# Patient Record
Sex: FEMALE | Race: WHITE | NOT HISPANIC OR LATINO | Employment: OTHER | URBAN - METROPOLITAN AREA
[De-identification: names, ages, dates, MRNs, and addresses within clinical notes are randomized per-mention and may not be internally consistent; named-entity substitution may affect disease eponyms.]

---

## 2023-02-19 PROBLEM — I25.10 CAD (CORONARY ARTERY DISEASE): Status: ACTIVE | Noted: 2023-02-19

## 2023-02-19 PROBLEM — S91.109A OPEN WOUND OF TOE: Status: ACTIVE | Noted: 2023-02-19

## 2023-02-19 PROBLEM — E11.9 DIABETES MELLITUS (MULTI): Status: ACTIVE | Noted: 2023-02-19

## 2023-02-19 PROBLEM — I25.5 ISCHEMIC CARDIOMYOPATHY: Status: ACTIVE | Noted: 2023-02-19

## 2023-02-19 PROBLEM — I50.42 CHRONIC COMBINED SYSTOLIC AND DIASTOLIC HEART FAILURE (MULTI): Status: ACTIVE | Noted: 2023-02-19

## 2023-02-19 PROBLEM — E78.5 DYSLIPIDEMIA: Status: ACTIVE | Noted: 2023-02-19

## 2023-02-19 PROBLEM — I25.82 CHRONIC TOTAL OCCLUSION OF CORONARY ARTERY: Status: ACTIVE | Noted: 2023-02-19

## 2023-02-19 PROBLEM — I10 BENIGN ESSENTIAL HYPERTENSION: Status: ACTIVE | Noted: 2023-02-19

## 2023-02-19 PROBLEM — Z95.810 BIVENTRICULAR IMPLANTABLE CARDIOVERTER-DEFIBRILLATOR (ICD) IN SITU: Status: ACTIVE | Noted: 2023-02-19

## 2023-02-19 PROBLEM — E66.3 OVERWEIGHT WITH BODY MASS INDEX (BMI) OF 26 TO 26.9 IN ADULT: Status: ACTIVE | Noted: 2023-02-19

## 2023-02-19 PROBLEM — I20.0 UNSTABLE ANGINA (MULTI): Status: ACTIVE | Noted: 2023-02-19

## 2023-02-19 PROBLEM — L03.211 CELLULITIS OF FACE: Status: ACTIVE | Noted: 2023-02-19

## 2023-02-19 PROBLEM — M25.512 LEFT SHOULDER PAIN: Status: ACTIVE | Noted: 2023-02-19

## 2023-02-19 PROBLEM — M70.21 OLECRANON BURSITIS OF RIGHT ELBOW: Status: ACTIVE | Noted: 2023-02-19

## 2023-02-19 PROBLEM — I44.7 LBBB (LEFT BUNDLE BRANCH BLOCK): Status: ACTIVE | Noted: 2023-02-19

## 2023-02-19 PROBLEM — E66.3 OVERWEIGHT WITH BODY MASS INDEX (BMI) OF 27 TO 27.9 IN ADULT: Status: ACTIVE | Noted: 2023-02-19

## 2023-02-19 PROBLEM — R39.9 UTI SYMPTOMS: Status: ACTIVE | Noted: 2023-02-19

## 2023-02-19 PROBLEM — L30.9 DERMATITIS: Status: ACTIVE | Noted: 2023-02-19

## 2023-02-19 PROBLEM — M25.421 SWELLING OF RIGHT ELBOW: Status: ACTIVE | Noted: 2023-02-19

## 2023-02-19 PROBLEM — J30.2 SEASONAL ALLERGIC RHINITIS: Status: ACTIVE | Noted: 2023-02-19

## 2023-02-19 PROBLEM — I20.89 CHRONIC STABLE ANGINA (CMS-HCC): Status: ACTIVE | Noted: 2023-02-19

## 2023-02-19 PROBLEM — I47.10 PAROXYSMAL SVT (SUPRAVENTRICULAR TACHYCARDIA) (CMS-HCC): Status: ACTIVE | Noted: 2023-02-19

## 2023-02-19 RX ORDER — ASPIRIN 81 MG/1
1 TABLET ORAL DAILY
COMMUNITY
End: 2023-10-24

## 2023-02-19 RX ORDER — DICLOFENAC SODIUM 10 MG/G
GEL TOPICAL 4 TIMES DAILY
COMMUNITY
End: 2023-05-22

## 2023-02-19 RX ORDER — DAPAGLIFLOZIN 10 MG/1
1 TABLET, FILM COATED ORAL DAILY
COMMUNITY
End: 2023-05-31 | Stop reason: SDUPTHER

## 2023-02-19 RX ORDER — METOPROLOL SUCCINATE 50 MG/1
1 TABLET, EXTENDED RELEASE ORAL DAILY
COMMUNITY
End: 2023-05-22

## 2023-02-19 RX ORDER — MUPIROCIN 20 MG/G
1 OINTMENT TOPICAL 3 TIMES DAILY
COMMUNITY
End: 2023-10-23

## 2023-02-19 RX ORDER — DULAGLUTIDE 1.5 MG/.5ML
0.5 INJECTION, SOLUTION SUBCUTANEOUS
COMMUNITY
End: 2023-07-13 | Stop reason: SDUPTHER

## 2023-02-19 RX ORDER — SPIRONOLACTONE 25 MG/1
1 TABLET ORAL DAILY
COMMUNITY
End: 2023-11-02

## 2023-02-19 RX ORDER — ISOSORBIDE MONONITRATE 60 MG/1
1 TABLET, EXTENDED RELEASE ORAL DAILY
COMMUNITY
End: 2024-04-19

## 2023-02-19 RX ORDER — PNV NO.95/FERROUS FUM/FOLIC AC 28MG-0.8MG
100 TABLET ORAL DAILY
COMMUNITY

## 2023-02-19 RX ORDER — NITROGLYCERIN 400 UG/1
1 SPRAY ORAL AS NEEDED
Status: ON HOLD | COMMUNITY
End: 2023-12-18 | Stop reason: ENTERED-IN-ERROR

## 2023-02-19 RX ORDER — ERGOCALCIFEROL 1.25 MG/1
CAPSULE ORAL
Status: ON HOLD | COMMUNITY
End: 2023-12-18 | Stop reason: ENTERED-IN-ERROR

## 2023-02-19 RX ORDER — SACUBITRIL AND VALSARTAN 24; 26 MG/1; MG/1
0.5 TABLET, FILM COATED ORAL 2 TIMES DAILY
COMMUNITY
End: 2024-05-08

## 2023-02-19 RX ORDER — ATORVASTATIN CALCIUM 80 MG/1
80 TABLET, FILM COATED ORAL NIGHTLY
COMMUNITY
End: 2024-01-18 | Stop reason: SDUPTHER

## 2023-02-19 RX ORDER — TRIAMCINOLONE ACETONIDE 1 MG/G
1 CREAM TOPICAL 2 TIMES DAILY
COMMUNITY
End: 2023-05-22

## 2023-02-19 RX ORDER — FLUTICASONE PROPIONATE 50 MCG
1 SPRAY, SUSPENSION (ML) NASAL DAILY PRN
COMMUNITY

## 2023-02-19 RX ORDER — MULTIVIT-MIN/IRON/FOLIC ACID/K 18-600-40
CAPSULE ORAL
Status: ON HOLD | COMMUNITY
End: 2023-12-18 | Stop reason: ENTERED-IN-ERROR

## 2023-03-14 ENCOUNTER — APPOINTMENT (OUTPATIENT)
Dept: PRIMARY CARE | Facility: CLINIC | Age: 68
End: 2023-03-14

## 2023-03-14 ENCOUNTER — PATIENT OUTREACH (OUTPATIENT)
Dept: CARE COORDINATION | Facility: CLINIC | Age: 68
End: 2023-03-14

## 2023-03-14 DIAGNOSIS — I51.9 HEART DISEASE, UNSPECIFIED: ICD-10-CM

## 2023-03-14 NOTE — PROGRESS NOTES
Discharge Facility: [facility]  Discharge Diagnosis:  Admission Reason: Chest discomfort, ventricular arrhythmias(1)   Final Discharge Diagnoses: Re-entry ventricular arrhythmia, chest pain     Admission Date:3.9.23  Discharge Date:3.13.23    PCP appt: 3.17.23    over course of hospitalization, Patient was seen and evaluated by Dr. Boyer.  Troponin x4 were within normal limits, overall low suspicion for coronary etiology.  Dr. Tarango is placed on consult due to frequent nonsustained VT runs.  Patient was started on sotalol and ICD settings were adjusted.  Echo was done, which showed improved EF. Patient developed hypotension on 3/11 overnight, medications were adjusted lowering the dose of Imdur and spironolactone. BP slightly improved, discussed with Dr. Boyer, baseline SBP is in the low 100s. Patient is cleared from cardiology and EP standpoint for dc and follow up as outpatient.     Engagement  Call Start Time: 1553 (3/14/2023  3:53 PM)    Medications  Medications reviewed with patient/caregiver?: Yes (3/14/2023  3:53 PM)  Is the patient having any side effects they believe may be caused by any medication additions or changes?: No (3/14/2023  3:53 PM)  Does the patient have all medications ordered at discharge?: Yes (3/14/2023  3:53 PM)  Care Management Interventions: No intervention needed (3/14/2023  3:53 PM)  Is the patient taking all medications as directed (includes completed medication regime)?: Yes (3/14/2023  3:53 PM)  Care Management Interventions: Nurse provided patient education (3/14/2023  3:53 PM)    Appointments  Does the patient have a primary care provider?: Yes (3/14/2023  3:53 PM)  Care Management Interventions: Verified appointment date/time/provider (3/14/2023  3:53 PM)  Has the patient kept scheduled appointments due by today?: Yes (3/14/2023  3:53 PM)  Care Management Interventions: Advised patient to keep appointment (3/14/2023  3:53 PM)    Self Management  What is the home health  agency?: n/a (3/14/2023  3:53 PM)    Patient Teaching  Does the patient have access to their discharge instructions?: Yes (3/14/2023  3:53 PM)  Care Management Interventions: Reviewed instructions with patient (3/14/2023  3:53 PM)  What is the patient's perception of their health status since discharge?: Improving (3/14/2023  3:53 PM)  Is the patient/caregiver able to teach back the hierarchy of who to call/visit for symptoms/problems? PCP, Specialist, Home Health nurse, Urgent Care, ED, 911: Yes (3/14/2023  3:53 PM)

## 2023-03-17 ENCOUNTER — OFFICE VISIT (OUTPATIENT)
Dept: PRIMARY CARE | Facility: CLINIC | Age: 68
End: 2023-03-17
Payer: COMMERCIAL

## 2023-03-17 VITALS
SYSTOLIC BLOOD PRESSURE: 100 MMHG | BODY MASS INDEX: 27.64 KG/M2 | TEMPERATURE: 97.1 F | RESPIRATION RATE: 16 BRPM | WEIGHT: 171.25 LBS | HEART RATE: 91 BPM | OXYGEN SATURATION: 97 % | DIASTOLIC BLOOD PRESSURE: 65 MMHG

## 2023-03-17 DIAGNOSIS — F41.9 ANXIETY: Primary | ICD-10-CM

## 2023-03-17 DIAGNOSIS — I47.10 PAROXYSMAL SVT (SUPRAVENTRICULAR TACHYCARDIA) (CMS-HCC): ICD-10-CM

## 2023-03-17 DIAGNOSIS — I50.42 CHRONIC COMBINED SYSTOLIC AND DIASTOLIC HEART FAILURE (MULTI): ICD-10-CM

## 2023-03-17 PROCEDURE — 3051F HG A1C>EQUAL 7.0%<8.0%: CPT | Performed by: STUDENT IN AN ORGANIZED HEALTH CARE EDUCATION/TRAINING PROGRAM

## 2023-03-17 PROCEDURE — 1036F TOBACCO NON-USER: CPT | Performed by: STUDENT IN AN ORGANIZED HEALTH CARE EDUCATION/TRAINING PROGRAM

## 2023-03-17 PROCEDURE — 99496 TRANSJ CARE MGMT HIGH F2F 7D: CPT | Performed by: STUDENT IN AN ORGANIZED HEALTH CARE EDUCATION/TRAINING PROGRAM

## 2023-03-17 PROCEDURE — 3074F SYST BP LT 130 MM HG: CPT | Performed by: STUDENT IN AN ORGANIZED HEALTH CARE EDUCATION/TRAINING PROGRAM

## 2023-03-17 PROCEDURE — 3078F DIAST BP <80 MM HG: CPT | Performed by: STUDENT IN AN ORGANIZED HEALTH CARE EDUCATION/TRAINING PROGRAM

## 2023-03-17 RX ORDER — BUSPIRONE HYDROCHLORIDE 5 MG/1
15 TABLET ORAL 2 TIMES DAILY PRN
Qty: 60 TABLET | Refills: 1 | Status: SHIPPED | OUTPATIENT
Start: 2023-03-17 | End: 2024-04-12 | Stop reason: WASHOUT

## 2023-03-17 RX ORDER — SOTALOL HYDROCHLORIDE 80 MG/1
80 TABLET ORAL EVERY 12 HOURS SCHEDULED
COMMUNITY
End: 2023-10-25 | Stop reason: SDUPTHER

## 2023-03-17 ASSESSMENT — ENCOUNTER SYMPTOMS
WHEEZING: 0
CONFUSION: 0
LIGHT-HEADEDNESS: 1
CHILLS: 0
COUGH: 0
DIFFICULTY URINATING: 0
PALPITATIONS: 0
LOSS OF SENSATION IN FEET: 0
SHORTNESS OF BREATH: 0
HEADACHES: 0
OCCASIONAL FEELINGS OF UNSTEADINESS: 0
SORE THROAT: 0
DEPRESSION: 0
NERVOUS/ANXIOUS: 1
FEVER: 0
ABDOMINAL PAIN: 0

## 2023-03-17 ASSESSMENT — PATIENT HEALTH QUESTIONNAIRE - PHQ9
1. LITTLE INTEREST OR PLEASURE IN DOING THINGS: NOT AT ALL
2. FEELING DOWN, DEPRESSED OR HOPELESS: NOT AT ALL
SUM OF ALL RESPONSES TO PHQ9 QUESTIONS 1 AND 2: 0

## 2023-03-17 NOTE — PROGRESS NOTES
Subjective   Patient ID: Antonia Rodriguez is a 68 y.o. female who presents for Hospital Follow-up.    HPI   Hospitalization 3/11/23-3/14/23; Summary taken from TCC Note  Patient was seen and evaluated by Dr. Boyer. Troponin x4 were within normal limits, overall low suspicion for coronary etiology. Dr. Tarango is placed on consult due to frequent nonsustained VT runs. Patient was started on sotalol and ICD settings were adjusted. Echo was done, which showed improved EF. Patient developed hypotension on 3/11 overnight, medications were adjusted lowering the dose of Imdur and spironolactone. BP slightly improved, discussed with Dr. Boyer, baseline SBP is in the low 100s. Patient is cleared from cardiology and EP standpoint for dc and follow up as outpatient.     Hospital follow up for chest pain.  ICD wasn't picking VT episodes and was not going off. Pacer was recalibrated while in the hospital. Now on sotolol.   Pain is improving and significantly improved from prior to hospitalization.     Is having intermittent lightheadedness - Bps have bene ranging 80s-120s/50-70s.  Per Patricia, they discussed changing medications while in the hospital, but ultimately did not make adjustments.     Merlin follow up appt 3/29  Emelina August 2023.    Thinks she may be having some anxiety. Was laying in bed last night, thinking about recent medical events. Also her sister in law is in the hospital. She has been having some disagreements with exhusband, which have all been causing increased stress.   - ICD has not gone off.    Review of Systems   Constitutional:  Negative for chills and fever.   HENT:  Negative for congestion and sore throat.    Eyes:  Negative for visual disturbance.   Respiratory:  Negative for cough, shortness of breath and wheezing.    Cardiovascular:  Negative for chest pain, palpitations and leg swelling.   Gastrointestinal:  Negative for abdominal pain.   Genitourinary:  Negative for difficulty urinating.    Neurological:  Positive for light-headedness. Negative for headaches.   Psychiatric/Behavioral:  Negative for confusion. The patient is nervous/anxious.        Objective   /65 (BP Location: Right arm, Patient Position: Sitting)   Pulse 91   Temp 36.2 °C (97.1 °F) (Temporal)   Resp 16   Wt 77.7 kg (171 lb 4 oz)   SpO2 97%   BMI 27.64 kg/m²     Physical Exam  Vitals and nursing note reviewed.   Constitutional:       General: She is not in acute distress.     Appearance: Normal appearance. She is not toxic-appearing.   HENT:      Head: Normocephalic and atraumatic.      Nose: Nose normal.      Mouth/Throat:      Mouth: Mucous membranes are moist.      Pharynx: Oropharynx is clear.   Eyes:      Extraocular Movements: Extraocular movements intact.      Conjunctiva/sclera: Conjunctivae normal.   Cardiovascular:      Rate and Rhythm: Normal rate and regular rhythm.      Heart sounds: Normal heart sounds.   Pulmonary:      Effort: Pulmonary effort is normal.      Breath sounds: Normal breath sounds.   Abdominal:      General: Abdomen is flat. Bowel sounds are normal.      Palpations: Abdomen is soft.   Musculoskeletal:         General: Normal range of motion.      Cervical back: Normal range of motion.   Skin:     General: Skin is warm and dry.   Neurological:      General: No focal deficit present.      Mental Status: She is alert and oriented to person, place, and time.   Psychiatric:         Behavior: Behavior normal.         Thought Content: Thought content normal.         Assessment/Plan   Diagnoses and all orders for this visit:  Anxiety  -     busPIRone (Buspar) 5 mg tablet; Take 3 tablets (15 mg) by mouth 2 times a day as needed (As needed for anxiety).  Chronic combined systolic and diastolic heart failure (CMS/HCC)  Paroxysmal SVT (supraventricular tachycardia) (CMS/HCC)  - Pain improving. No interventions at this time. Discussed mild symptomatic hypotension, may benefit from medication adjustments.  She plans to bring up with Dr. Boyer, cardiology, at her follow up appt in 2 weeks.  - Discussed indications to present to the ER with patient, such as chest pain, SOB, persistent palpitations, her ICD going off, syncope, or any concerning symptom she may have.    Katherine Monae, DO

## 2023-04-18 ENCOUNTER — PATIENT OUTREACH (OUTPATIENT)
Dept: CARE COORDINATION | Facility: CLINIC | Age: 68
End: 2023-04-18
Payer: COMMERCIAL

## 2023-04-18 NOTE — PROGRESS NOTES
Call regarding apt with PCP on 4.18.23after hospitalization for .  At time of outreach call the patient feels as if their condition has improved since last visit.  Patient verbalizes understanding of new orders including labs, therapy, HHC, and DME.  Also following with cardiologist.  Patient verbalizes understanding of medications including changes made during PCP  Patient verbalizes understanding of referrals needed to specialist.  Patient verbalized understanding plan of care   Any further questions or concerns at this time for PCP? No  Does patient appear to have CCM needs and will need referral to nurse after call back? No

## 2023-05-15 ENCOUNTER — OFFICE VISIT (OUTPATIENT)
Dept: PRIMARY CARE | Facility: CLINIC | Age: 68
End: 2023-05-15
Payer: COMMERCIAL

## 2023-05-15 VITALS
WEIGHT: 171 LBS | OXYGEN SATURATION: 97 % | HEART RATE: 90 BPM | TEMPERATURE: 97.9 F | DIASTOLIC BLOOD PRESSURE: 72 MMHG | RESPIRATION RATE: 17 BRPM | BODY MASS INDEX: 27.6 KG/M2 | SYSTOLIC BLOOD PRESSURE: 108 MMHG

## 2023-05-15 DIAGNOSIS — S20.211A CONTUSION OF RIGHT CHEST WALL, INITIAL ENCOUNTER: Primary | ICD-10-CM

## 2023-05-15 PROCEDURE — 1036F TOBACCO NON-USER: CPT | Performed by: STUDENT IN AN ORGANIZED HEALTH CARE EDUCATION/TRAINING PROGRAM

## 2023-05-15 PROCEDURE — 3078F DIAST BP <80 MM HG: CPT | Performed by: STUDENT IN AN ORGANIZED HEALTH CARE EDUCATION/TRAINING PROGRAM

## 2023-05-15 PROCEDURE — 99213 OFFICE O/P EST LOW 20 MIN: CPT | Performed by: STUDENT IN AN ORGANIZED HEALTH CARE EDUCATION/TRAINING PROGRAM

## 2023-05-15 PROCEDURE — 3051F HG A1C>EQUAL 7.0%<8.0%: CPT | Performed by: STUDENT IN AN ORGANIZED HEALTH CARE EDUCATION/TRAINING PROGRAM

## 2023-05-15 PROCEDURE — 3074F SYST BP LT 130 MM HG: CPT | Performed by: STUDENT IN AN ORGANIZED HEALTH CARE EDUCATION/TRAINING PROGRAM

## 2023-05-15 PROCEDURE — 1159F MED LIST DOCD IN RCRD: CPT | Performed by: STUDENT IN AN ORGANIZED HEALTH CARE EDUCATION/TRAINING PROGRAM

## 2023-05-15 ASSESSMENT — ENCOUNTER SYMPTOMS
COUGH: 0
RHINORRHEA: 0
DIZZINESS: 0
HEADACHES: 0
FEVER: 0
CHILLS: 0
SHORTNESS OF BREATH: 0

## 2023-05-15 NOTE — PROGRESS NOTES
Subjective   Patient ID: Antonia Rodriguez is a 68 y.o. female who presents for Follow-up (Bruise on pt chest).    Bruise on her right chest near her axilla.  Started on Saturday 5/13. Started as a lump Saturday, then became a bruise on Sunday.  Rates pain 2/10, constant.  Has used iced on the spot.  Not taking any medication for pain.  Denies falls, trauma to site, recent strenuous activity or repetitive movements.     Denies chest pain, SOB, LUE paresthesia.         Review of Systems   Constitutional:  Negative for chills and fever.   HENT:  Negative for congestion and rhinorrhea.    Respiratory:  Negative for cough and shortness of breath.    Cardiovascular:  Negative for chest pain.   Skin:         Bruising right chest   Neurological:  Negative for dizziness and headaches.       Objective   /72 (BP Location: Left arm, Patient Position: Sitting, BP Cuff Size: Adult)   Pulse 90   Temp 36.6 °C (97.9 °F)   Resp 17   Wt 77.6 kg (171 lb)   SpO2 97%   BMI 27.60 kg/m²     Physical Exam  Vitals and nursing note reviewed.   Constitutional:       General: She is not in acute distress.     Appearance: Normal appearance. She is not toxic-appearing.   HENT:      Head: Normocephalic and atraumatic.      Nose: Nose normal.      Mouth/Throat:      Mouth: Mucous membranes are moist.      Pharynx: Oropharynx is clear.   Eyes:      Extraocular Movements: Extraocular movements intact.      Conjunctiva/sclera: Conjunctivae normal.   Cardiovascular:      Rate and Rhythm: Normal rate and regular rhythm.      Heart sounds: Normal heart sounds.   Pulmonary:      Effort: Pulmonary effort is normal.      Breath sounds: Normal breath sounds.   Abdominal:      General: Abdomen is flat. Bowel sounds are normal.      Palpations: Abdomen is soft.   Musculoskeletal:         General: Normal range of motion.      Cervical back: Normal range of motion.   Skin:     General: Skin is warm and dry.      Findings: Bruising present.       Comments: 2cm in diameter bruise at right chest medial to axilla. Small palpable nodular bump on palpation below bruise. Mildly tender to palpation.   Neurological:      General: No focal deficit present.      Mental Status: She is alert and oriented to person, place, and time.   Psychiatric:         Behavior: Behavior normal.         Thought Content: Thought content normal.       68 year old female presenting for bruising on right right chest medial to her axilla. On palpation,, site is mildly tender with a small nodular feel below bruise. Mammogram 09/2022 normal. Will obtain US of chest to evaluate bruise. Suspect it is 2/2 to small trauma and she is more sensitive to bruising due to blood thinners. Discussed indications to follow up in office - worsening pain, swelling, paresthesia, increasing in size of bruise, or any concern she has.     Assessment/Plan   Diagnoses and all orders for this visit:  Contusion of right chest wall, initial encounter  -     US chest; Future
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
## 2023-05-19 DIAGNOSIS — R22.2 CHEST WALL MASS: Primary | ICD-10-CM

## 2023-05-19 NOTE — RESULT ENCOUNTER NOTE
Can we let the patient know the ultrasound showed nonspecific findings.  I believe it may be a complex hematoma however we need to sure it resolves.  I wrote a general surgery referral, she is to schedule if it is still present in 1 to 2 weeks.  Also please follow-up with us in 1 week

## 2023-05-22 ENCOUNTER — OFFICE VISIT (OUTPATIENT)
Dept: PRIMARY CARE | Facility: CLINIC | Age: 68
End: 2023-05-22
Payer: COMMERCIAL

## 2023-05-22 VITALS
RESPIRATION RATE: 18 BRPM | WEIGHT: 170 LBS | SYSTOLIC BLOOD PRESSURE: 106 MMHG | HEART RATE: 82 BPM | BODY MASS INDEX: 27.32 KG/M2 | DIASTOLIC BLOOD PRESSURE: 67 MMHG | HEIGHT: 66 IN | TEMPERATURE: 98.3 F | OXYGEN SATURATION: 97 %

## 2023-05-22 DIAGNOSIS — S20.211S CONTUSION OF RIGHT CHEST WALL, SEQUELA: Primary | ICD-10-CM

## 2023-05-22 PROCEDURE — 3074F SYST BP LT 130 MM HG: CPT | Performed by: STUDENT IN AN ORGANIZED HEALTH CARE EDUCATION/TRAINING PROGRAM

## 2023-05-22 PROCEDURE — 3078F DIAST BP <80 MM HG: CPT | Performed by: STUDENT IN AN ORGANIZED HEALTH CARE EDUCATION/TRAINING PROGRAM

## 2023-05-22 PROCEDURE — 1159F MED LIST DOCD IN RCRD: CPT | Performed by: STUDENT IN AN ORGANIZED HEALTH CARE EDUCATION/TRAINING PROGRAM

## 2023-05-22 PROCEDURE — 1036F TOBACCO NON-USER: CPT | Performed by: STUDENT IN AN ORGANIZED HEALTH CARE EDUCATION/TRAINING PROGRAM

## 2023-05-22 PROCEDURE — 1160F RVW MEDS BY RX/DR IN RCRD: CPT | Performed by: STUDENT IN AN ORGANIZED HEALTH CARE EDUCATION/TRAINING PROGRAM

## 2023-05-22 PROCEDURE — 99213 OFFICE O/P EST LOW 20 MIN: CPT | Performed by: STUDENT IN AN ORGANIZED HEALTH CARE EDUCATION/TRAINING PROGRAM

## 2023-05-22 PROCEDURE — 3051F HG A1C>EQUAL 7.0%<8.0%: CPT | Performed by: STUDENT IN AN ORGANIZED HEALTH CARE EDUCATION/TRAINING PROGRAM

## 2023-05-22 ASSESSMENT — ENCOUNTER SYMPTOMS
FEVER: 0
SHORTNESS OF BREATH: 0
CHILLS: 0
RHINORRHEA: 0
HEADACHES: 0
COUGH: 0
DIZZINESS: 0

## 2023-05-22 NOTE — PROGRESS NOTES
"Subjective   Patient ID: Antonia Rodriguez is a 68 y.o. female who presents for Follow-up (Cyst on chest).    HPI   Pt here for follow up on swelling and bruise at right chest. Bruising is improving. Pt had outlined bruise, feels that in increased in size and then began to decrease and heal. She can still feel a nodule at the superior aspect of the bruise on her right chest.     Denies chest pain, SOB, paresthesias.     US of bruise and nodule obtained - indeterminate lesion 1.2cm may represent complex hematoma. Follow up recommended.     Review of Systems   Constitutional:  Negative for chills and fever.   HENT:  Negative for congestion and rhinorrhea.    Respiratory:  Negative for cough and shortness of breath.    Cardiovascular:  Negative for chest pain.   Neurological:  Negative for dizziness and headaches.       Objective   /67 (BP Location: Right arm, Patient Position: Sitting)   Pulse 82   Temp 36.8 °C (98.3 °F)   Resp 18   Ht 1.676 m (5' 6\")   Wt 77.1 kg (170 lb)   SpO2 97%   BMI 27.44 kg/m²     Physical Exam  Vitals and nursing note reviewed.   Constitutional:       General: She is not in acute distress.     Appearance: Normal appearance. She is not toxic-appearing.   HENT:      Head: Normocephalic and atraumatic.      Nose: Nose normal.      Mouth/Throat:      Mouth: Mucous membranes are moist.      Pharynx: Oropharynx is clear.   Eyes:      Extraocular Movements: Extraocular movements intact.      Conjunctiva/sclera: Conjunctivae normal.   Cardiovascular:      Rate and Rhythm: Normal rate and regular rhythm.      Heart sounds: Normal heart sounds.   Pulmonary:      Effort: Pulmonary effort is normal.      Breath sounds: Normal breath sounds.   Abdominal:      General: Abdomen is flat. Bowel sounds are normal.      Palpations: Abdomen is soft.   Musculoskeletal:         General: Normal range of motion.      Cervical back: Normal range of motion.   Skin:     General: Skin is warm and dry.      " Comments: Bruise at right chest superior to axilla improving - yellow in color. 1cm nodule palpable, soft, mildly tender, movable.    Neurological:      General: No focal deficit present.      Mental Status: She is alert and oriented to person, place, and time.   Psychiatric:         Behavior: Behavior normal.         Thought Content: Thought content normal.       68 year old female with pmh CHF s/p pacemaker presenting for follow up of right chest contusion. Bruising is improved from initial visit 1 week ago. US reviewed - indeterminate 1.2cm lesion. Suspect this may be hematoma healing, but is delayed due to anticoagulation. Will follow up in 3 weeks, if nodule still present and palpable, will obtain CT. Discussed to avoid pressing on lesion and to use heat pack to help with healing process. To follow up in 3 weeks, sooner if lesion is changing or patient has concerns.     Assessment/Plan   Diagnoses and all orders for this visit:  Contusion of right chest wall, sequela

## 2023-05-22 NOTE — PROGRESS NOTES
I reviewed with the resident the medical history and the resident’s findings on physical examination.  I discussed with the resident the patient’s diagnosis and concur with the treatment plan as documented in the resident note.   The bruising has improved.  Still has a nodule.  Patient to try warm compress.  We will give it 2 more weeks.  If its still present she will have to see general surgery to have this removed.  Patient aware if any swelling any chest pain shortness of breath any nausea vomit diarrhea fever headache any concerning symptoms to go to the ER  Follow-up in 2 weeks.  May need a CT scan also  Emil Guthrie, DO

## 2023-05-31 DIAGNOSIS — E11.9 TYPE 2 DIABETES MELLITUS WITHOUT COMPLICATION, WITHOUT LONG-TERM CURRENT USE OF INSULIN (MULTI): Primary | ICD-10-CM

## 2023-06-01 RX ORDER — DAPAGLIFLOZIN 10 MG/1
10 TABLET, FILM COATED ORAL DAILY
Qty: 90 TABLET | Refills: 2 | Status: SHIPPED | OUTPATIENT
Start: 2023-06-01 | End: 2024-05-08 | Stop reason: SDUPTHER

## 2023-06-05 ENCOUNTER — TELEPHONE (OUTPATIENT)
Dept: PRIMARY CARE | Facility: CLINIC | Age: 68
End: 2023-06-05
Payer: COMMERCIAL

## 2023-06-06 ENCOUNTER — DOCUMENTATION (OUTPATIENT)
Dept: PRIMARY CARE | Facility: CLINIC | Age: 68
End: 2023-06-06
Payer: COMMERCIAL

## 2023-06-06 NOTE — PROGRESS NOTES
Patient called on 6/5/23 due to a wound on her lower extremity that has not stopped bleeding. On talking to her on 6/6/23, the wound has stopped bleeding. She denies chest pain, SOB, leg pain, dizziness, light headedness, palpitations.   Discussed that if she is experiencing symptoms, to call the office and come in to be seen. If she does experience symptoms, we will get blood wound to check her blood counts and INR.

## 2023-06-08 ENCOUNTER — OFFICE VISIT (OUTPATIENT)
Dept: PRIMARY CARE | Facility: CLINIC | Age: 68
End: 2023-06-08
Payer: COMMERCIAL

## 2023-06-08 VITALS
TEMPERATURE: 98.4 F | HEIGHT: 66 IN | RESPIRATION RATE: 16 BRPM | WEIGHT: 171.38 LBS | OXYGEN SATURATION: 96 % | DIASTOLIC BLOOD PRESSURE: 70 MMHG | BODY MASS INDEX: 27.54 KG/M2 | HEART RATE: 87 BPM | SYSTOLIC BLOOD PRESSURE: 104 MMHG

## 2023-06-08 DIAGNOSIS — L97.522 ULCER OF TOE OF LEFT FOOT, WITH FAT LAYER EXPOSED (MULTI): ICD-10-CM

## 2023-06-08 DIAGNOSIS — I50.42 CHRONIC COMBINED SYSTOLIC AND DIASTOLIC HEART FAILURE (MULTI): ICD-10-CM

## 2023-06-08 DIAGNOSIS — R05.1 ACUTE COUGH: Primary | ICD-10-CM

## 2023-06-08 DIAGNOSIS — E11.42 DIABETIC POLYNEUROPATHY ASSOCIATED WITH TYPE 2 DIABETES MELLITUS (MULTI): ICD-10-CM

## 2023-06-08 DIAGNOSIS — I20.89 CHRONIC STABLE ANGINA (CMS-HCC): ICD-10-CM

## 2023-06-08 PROCEDURE — 3078F DIAST BP <80 MM HG: CPT | Performed by: STUDENT IN AN ORGANIZED HEALTH CARE EDUCATION/TRAINING PROGRAM

## 2023-06-08 PROCEDURE — 1159F MED LIST DOCD IN RCRD: CPT | Performed by: STUDENT IN AN ORGANIZED HEALTH CARE EDUCATION/TRAINING PROGRAM

## 2023-06-08 PROCEDURE — 99213 OFFICE O/P EST LOW 20 MIN: CPT | Performed by: STUDENT IN AN ORGANIZED HEALTH CARE EDUCATION/TRAINING PROGRAM

## 2023-06-08 PROCEDURE — 3074F SYST BP LT 130 MM HG: CPT | Performed by: STUDENT IN AN ORGANIZED HEALTH CARE EDUCATION/TRAINING PROGRAM

## 2023-06-08 PROCEDURE — 1160F RVW MEDS BY RX/DR IN RCRD: CPT | Performed by: STUDENT IN AN ORGANIZED HEALTH CARE EDUCATION/TRAINING PROGRAM

## 2023-06-08 PROCEDURE — 1036F TOBACCO NON-USER: CPT | Performed by: STUDENT IN AN ORGANIZED HEALTH CARE EDUCATION/TRAINING PROGRAM

## 2023-06-08 PROCEDURE — 3051F HG A1C>EQUAL 7.0%<8.0%: CPT | Performed by: STUDENT IN AN ORGANIZED HEALTH CARE EDUCATION/TRAINING PROGRAM

## 2023-06-08 RX ORDER — BENZONATATE 100 MG/1
100 CAPSULE ORAL 3 TIMES DAILY PRN
Qty: 42 CAPSULE | Refills: 0 | Status: SHIPPED | OUTPATIENT
Start: 2023-06-08 | End: 2023-07-08

## 2023-06-08 RX ORDER — GUAIFENESIN 600 MG/1
1200 TABLET, EXTENDED RELEASE ORAL 2 TIMES DAILY
Qty: 30 TABLET | Refills: 0 | Status: SHIPPED | OUTPATIENT
Start: 2023-06-08 | End: 2023-10-23

## 2023-06-08 ASSESSMENT — ENCOUNTER SYMPTOMS
COUGH: 1
SHORTNESS OF BREATH: 1
FEVER: 0
CHILLS: 1
CHEST TIGHTNESS: 1
HEADACHES: 0
UNEXPECTED WEIGHT CHANGE: 0
DIZZINESS: 0
RHINORRHEA: 0
SINUS PRESSURE: 0

## 2023-06-08 NOTE — PROGRESS NOTES
"Subjective   Patient ID: Antonia Rodriguez is a 68 y.o. female who presents for Cough (Pt here today for persistent cough).    HPI   Cough started Tuesday, 6/6, as well as chest pressure.   Took nitroglycerin at 1030 this morning. Does feel improvement after the medication.  Does get chills, intermittently.   Breathing is mixed. Yesterday \"felt like she was drowning\" and then coughed up some phlegm and felt improved. But then will also have the intermittent chest pressure.     Review of Systems   Constitutional:  Positive for chills. Negative for fever and unexpected weight change.   HENT:  Negative for congestion, rhinorrhea and sinus pressure.    Respiratory:  Positive for cough, chest tightness and shortness of breath.    Cardiovascular:  Negative for leg swelling.   Neurological:  Negative for dizziness and headaches.       Objective   /70 (BP Location: Right arm, Patient Position: Sitting)   Pulse 87   Temp 36.9 °C (98.4 °F) (Temporal)   Resp 16   Ht 1.676 m (5' 6\")   Wt 77.7 kg (171 lb 6 oz)   SpO2 96%   BMI 27.66 kg/m²     Physical Exam  Vitals and nursing note reviewed.   Constitutional:       General: She is not in acute distress.     Appearance: Normal appearance. She is not toxic-appearing.   HENT:      Head: Normocephalic and atraumatic.      Right Ear: Tympanic membrane, ear canal and external ear normal.      Left Ear: Tympanic membrane, ear canal and external ear normal.      Nose: Nose normal. No congestion or rhinorrhea.      Mouth/Throat:      Mouth: Mucous membranes are moist.      Pharynx: Oropharynx is clear. No oropharyngeal exudate or posterior oropharyngeal erythema.   Eyes:      Extraocular Movements: Extraocular movements intact.      Conjunctiva/sclera: Conjunctivae normal.      Pupils: Pupils are equal, round, and reactive to light.   Cardiovascular:      Rate and Rhythm: Normal rate and regular rhythm.      Heart sounds: Normal heart sounds.   Pulmonary:      Effort: " Pulmonary effort is normal. No respiratory distress.      Breath sounds: Normal breath sounds. No wheezing, rhonchi or rales.   Abdominal:      General: Abdomen is flat. Bowel sounds are normal.      Palpations: Abdomen is soft.   Musculoskeletal:         General: Normal range of motion.      Cervical back: Normal range of motion and neck supple.      Right lower leg: No edema.      Left lower leg: No edema.   Skin:     General: Skin is warm and dry.   Neurological:      General: No focal deficit present.      Mental Status: She is alert and oriented to person, place, and time.   Psychiatric:         Mood and Affect: Mood normal.         Behavior: Behavior normal.         Thought Content: Thought content normal.       68 year old female presenting for acute cough with chest tightness. Physical exam unremarkable. However, given her significant cardiac history, CXR obtained and review in office. No signs of CHF exacerbation on imaging or on physical. Suspect viral URI. Will start mucinex and tessalon perls at this time with low threshold to cover for bacterial infection if no improvement. She is to call office on Monday, 6/12, if no improvement.    Assessment/Plan   Diagnoses and all orders for this visit:  Acute cough  -     XR chest 2 views; Future  -     benzonatate (Tessalon) 100 mg capsule; Take 1 capsule (100 mg) by mouth 3 times a day as needed for cough. Do not crush or chew.  -     guaiFENesin (Mucinex) 600 mg 12 hr tablet; Take 2 tablets (1,200 mg) by mouth 2 times a day. Do not crush, chew, or split.

## 2023-06-09 PROBLEM — E11.42 DIABETIC POLYNEUROPATHY ASSOCIATED WITH TYPE 2 DIABETES MELLITUS (MULTI): Status: ACTIVE | Noted: 2023-06-09

## 2023-06-09 PROBLEM — L97.522 ULCER OF TOE OF LEFT FOOT, WITH FAT LAYER EXPOSED (MULTI): Status: ACTIVE | Noted: 2023-06-09

## 2023-06-12 ENCOUNTER — APPOINTMENT (OUTPATIENT)
Dept: PRIMARY CARE | Facility: CLINIC | Age: 68
End: 2023-06-12
Payer: COMMERCIAL

## 2023-07-12 LAB
BASOPHILS (10*3/UL) IN BLOOD BY AUTOMATED COUNT: 0.05 X10E9/L (ref 0–0.1)
BASOPHILS/100 LEUKOCYTES IN BLOOD BY AUTOMATED COUNT: 0.6 % (ref 0–2)
C REACTIVE PROTEIN (MG/L) IN SER/PLAS: <0.1 MG/DL
EOSINOPHILS (10*3/UL) IN BLOOD BY AUTOMATED COUNT: 0.18 X10E9/L (ref 0–0.7)
EOSINOPHILS/100 LEUKOCYTES IN BLOOD BY AUTOMATED COUNT: 2.2 % (ref 0–6)
ERYTHROCYTE DISTRIBUTION WIDTH (RATIO) BY AUTOMATED COUNT: 14.1 % (ref 11.5–14.5)
ERYTHROCYTE MEAN CORPUSCULAR HEMOGLOBIN CONCENTRATION (G/DL) BY AUTOMATED: 31.9 G/DL (ref 32–36)
ERYTHROCYTE MEAN CORPUSCULAR VOLUME (FL) BY AUTOMATED COUNT: 93 FL (ref 80–100)
ERYTHROCYTES (10*6/UL) IN BLOOD BY AUTOMATED COUNT: 4.9 X10E12/L (ref 4–5.2)
HEMATOCRIT (%) IN BLOOD BY AUTOMATED COUNT: 45.4 % (ref 36–46)
HEMOGLOBIN (G/DL) IN BLOOD: 14.5 G/DL (ref 12–16)
IMMATURE GRANULOCYTES/100 LEUKOCYTES IN BLOOD BY AUTOMATED COUNT: 0.4 % (ref 0–0.9)
LEUKOCYTES (10*3/UL) IN BLOOD BY AUTOMATED COUNT: 8.2 X10E9/L (ref 4.4–11.3)
LYMPHOCYTES (10*3/UL) IN BLOOD BY AUTOMATED COUNT: 2 X10E9/L (ref 1.2–4.8)
LYMPHOCYTES/100 LEUKOCYTES IN BLOOD BY AUTOMATED COUNT: 24.4 % (ref 13–44)
MONOCYTES (10*3/UL) IN BLOOD BY AUTOMATED COUNT: 0.63 X10E9/L (ref 0.1–1)
MONOCYTES/100 LEUKOCYTES IN BLOOD BY AUTOMATED COUNT: 7.7 % (ref 2–10)
NEUTROPHILS (10*3/UL) IN BLOOD BY AUTOMATED COUNT: 5.31 X10E9/L (ref 1.2–7.7)
NEUTROPHILS/100 LEUKOCYTES IN BLOOD BY AUTOMATED COUNT: 64.7 % (ref 40–80)
PLATELETS (10*3/UL) IN BLOOD AUTOMATED COUNT: 345 X10E9/L (ref 150–450)
SEDIMENTATION RATE, ERYTHROCYTE: 2 MM/H (ref 0–30)

## 2023-07-13 ENCOUNTER — TELEPHONE (OUTPATIENT)
Dept: PRIMARY CARE | Facility: CLINIC | Age: 68
End: 2023-07-13
Payer: COMMERCIAL

## 2023-07-13 DIAGNOSIS — E11.9 TYPE 2 DIABETES MELLITUS WITHOUT COMPLICATION, WITHOUT LONG-TERM CURRENT USE OF INSULIN (MULTI): Primary | ICD-10-CM

## 2023-07-13 RX ORDER — DULAGLUTIDE 1.5 MG/.5ML
1.5 INJECTION, SOLUTION SUBCUTANEOUS
Qty: 2 ML | Refills: 3 | Status: SHIPPED | OUTPATIENT
Start: 2023-07-13 | End: 2023-10-25 | Stop reason: SDUPTHER

## 2023-07-24 ENCOUNTER — DOCUMENTATION (OUTPATIENT)
Dept: CARE COORDINATION | Facility: CLINIC | Age: 68
End: 2023-07-24
Payer: COMMERCIAL

## 2023-10-02 ENCOUNTER — LAB (OUTPATIENT)
Dept: LAB | Facility: LAB | Age: 68
End: 2023-10-02
Payer: COMMERCIAL

## 2023-10-02 ENCOUNTER — OFFICE VISIT (OUTPATIENT)
Dept: PRIMARY CARE | Facility: CLINIC | Age: 68
End: 2023-10-02
Payer: COMMERCIAL

## 2023-10-02 ENCOUNTER — APPOINTMENT (OUTPATIENT)
Dept: SLEEP MEDICINE | Facility: CLINIC | Age: 68
End: 2023-10-02
Payer: COMMERCIAL

## 2023-10-02 VITALS
SYSTOLIC BLOOD PRESSURE: 86 MMHG | HEIGHT: 66 IN | RESPIRATION RATE: 16 BRPM | DIASTOLIC BLOOD PRESSURE: 55 MMHG | BODY MASS INDEX: 27.72 KG/M2 | WEIGHT: 172.5 LBS | HEART RATE: 88 BPM | TEMPERATURE: 97.6 F | OXYGEN SATURATION: 97 %

## 2023-10-02 DIAGNOSIS — E11.9 TYPE 2 DIABETES MELLITUS WITHOUT COMPLICATION, WITHOUT LONG-TERM CURRENT USE OF INSULIN (MULTI): ICD-10-CM

## 2023-10-02 DIAGNOSIS — E11.42 DIABETIC POLYNEUROPATHY ASSOCIATED WITH TYPE 2 DIABETES MELLITUS (MULTI): ICD-10-CM

## 2023-10-02 DIAGNOSIS — Z00.00 ROUTINE GENERAL MEDICAL EXAMINATION AT HEALTH CARE FACILITY: Primary | ICD-10-CM

## 2023-10-02 DIAGNOSIS — I50.42 CHRONIC COMBINED SYSTOLIC AND DIASTOLIC HEART FAILURE (MULTI): ICD-10-CM

## 2023-10-02 LAB
ALBUMIN SERPL BCP-MCNC: 4.2 G/DL (ref 3.4–5)
ALP SERPL-CCNC: 93 U/L (ref 33–136)
ALT SERPL W P-5'-P-CCNC: 20 U/L (ref 7–45)
ANION GAP SERPL CALC-SCNC: 11 MMOL/L (ref 10–20)
AST SERPL W P-5'-P-CCNC: 20 U/L (ref 9–39)
BILIRUB SERPL-MCNC: 0.6 MG/DL (ref 0–1.2)
BUN SERPL-MCNC: 16 MG/DL (ref 6–23)
CALCIUM SERPL-MCNC: 9.2 MG/DL (ref 8.6–10.3)
CHLORIDE SERPL-SCNC: 105 MMOL/L (ref 98–107)
CO2 SERPL-SCNC: 27 MMOL/L (ref 21–32)
CREAT SERPL-MCNC: 1.05 MG/DL (ref 0.5–1.05)
EST. AVERAGE GLUCOSE BLD GHB EST-MCNC: 163 MG/DL
GFR SERPL CREATININE-BSD FRML MDRD: 58 ML/MIN/1.73M*2
GLUCOSE SERPL-MCNC: 225 MG/DL (ref 74–99)
HBA1C MFR BLD: 7.3 %
POTASSIUM SERPL-SCNC: 4.2 MMOL/L (ref 3.5–5.3)
PROT SERPL-MCNC: 6.9 G/DL (ref 6.4–8.2)
SODIUM SERPL-SCNC: 139 MMOL/L (ref 136–145)
TSH SERPL-ACNC: 1.23 MIU/L (ref 0.44–3.98)

## 2023-10-02 PROCEDURE — 36415 COLL VENOUS BLD VENIPUNCTURE: CPT

## 2023-10-02 PROCEDURE — 1159F MED LIST DOCD IN RCRD: CPT | Performed by: STUDENT IN AN ORGANIZED HEALTH CARE EDUCATION/TRAINING PROGRAM

## 2023-10-02 PROCEDURE — 3051F HG A1C>EQUAL 7.0%<8.0%: CPT | Performed by: STUDENT IN AN ORGANIZED HEALTH CARE EDUCATION/TRAINING PROGRAM

## 2023-10-02 PROCEDURE — 1160F RVW MEDS BY RX/DR IN RCRD: CPT | Performed by: STUDENT IN AN ORGANIZED HEALTH CARE EDUCATION/TRAINING PROGRAM

## 2023-10-02 PROCEDURE — G0439 PPPS, SUBSEQ VISIT: HCPCS | Performed by: STUDENT IN AN ORGANIZED HEALTH CARE EDUCATION/TRAINING PROGRAM

## 2023-10-02 PROCEDURE — 1170F FXNL STATUS ASSESSED: CPT | Performed by: STUDENT IN AN ORGANIZED HEALTH CARE EDUCATION/TRAINING PROGRAM

## 2023-10-02 PROCEDURE — 3078F DIAST BP <80 MM HG: CPT | Performed by: STUDENT IN AN ORGANIZED HEALTH CARE EDUCATION/TRAINING PROGRAM

## 2023-10-02 PROCEDURE — 3074F SYST BP LT 130 MM HG: CPT | Performed by: STUDENT IN AN ORGANIZED HEALTH CARE EDUCATION/TRAINING PROGRAM

## 2023-10-02 PROCEDURE — 99214 OFFICE O/P EST MOD 30 MIN: CPT | Performed by: STUDENT IN AN ORGANIZED HEALTH CARE EDUCATION/TRAINING PROGRAM

## 2023-10-02 PROCEDURE — 1036F TOBACCO NON-USER: CPT | Performed by: STUDENT IN AN ORGANIZED HEALTH CARE EDUCATION/TRAINING PROGRAM

## 2023-10-02 SDOH — ECONOMIC STABILITY: HOUSING INSECURITY
IN THE LAST 12 MONTHS, WAS THERE A TIME WHEN YOU DID NOT HAVE A STEADY PLACE TO SLEEP OR SLEPT IN A SHELTER (INCLUDING NOW)?: NO

## 2023-10-02 SDOH — ECONOMIC STABILITY: FOOD INSECURITY: WITHIN THE PAST 12 MONTHS, YOU WORRIED THAT YOUR FOOD WOULD RUN OUT BEFORE YOU GOT MONEY TO BUY MORE.: NEVER TRUE

## 2023-10-02 SDOH — ECONOMIC STABILITY: FOOD INSECURITY: WITHIN THE PAST 12 MONTHS, THE FOOD YOU BOUGHT JUST DIDN'T LAST AND YOU DIDN'T HAVE MONEY TO GET MORE.: NEVER TRUE

## 2023-10-02 SDOH — ECONOMIC STABILITY: GENERAL
WHICH OF THE FOLLOWING DO YOU KNOW HOW TO USE AND HAVE ACCESS TO EVERY DAY? (CHOOSE ALL THAT APPLY): SMARTPHONE WITH CELLULAR DATA PLAN;NONE OF THESE

## 2023-10-02 SDOH — ECONOMIC STABILITY: INCOME INSECURITY: IN THE LAST 12 MONTHS, WAS THERE A TIME WHEN YOU WERE NOT ABLE TO PAY THE MORTGAGE OR RENT ON TIME?: NO

## 2023-10-02 SDOH — ECONOMIC STABILITY: GENERAL
WHICH OF THE FOLLOWING WOULD YOU LIKE TO GET CONNECTED TO IN ORDER TO RECEIVE A DISCOUNT OR FOR FREE? (CHOOSE ALL THAT APPLY): NONE OF THESE

## 2023-10-02 SDOH — ECONOMIC STABILITY: TRANSPORTATION INSECURITY
IN THE PAST 12 MONTHS, HAS LACK OF TRANSPORTATION KEPT YOU FROM MEETINGS, WORK, OR FROM GETTING THINGS NEEDED FOR DAILY LIVING?: NO

## 2023-10-02 SDOH — HEALTH STABILITY: PHYSICAL HEALTH: ON AVERAGE, HOW MANY DAYS PER WEEK DO YOU ENGAGE IN MODERATE TO STRENUOUS EXERCISE (LIKE A BRISK WALK)?: 5 DAYS

## 2023-10-02 SDOH — HEALTH STABILITY: PHYSICAL HEALTH: ON AVERAGE, HOW MANY MINUTES DO YOU ENGAGE IN EXERCISE AT THIS LEVEL?: 90 MIN

## 2023-10-02 SDOH — ECONOMIC STABILITY: TRANSPORTATION INSECURITY
IN THE PAST 12 MONTHS, HAS THE LACK OF TRANSPORTATION KEPT YOU FROM MEDICAL APPOINTMENTS OR FROM GETTING MEDICATIONS?: NO

## 2023-10-02 ASSESSMENT — ENCOUNTER SYMPTOMS
SHORTNESS OF BREATH: 0
ABDOMINAL PAIN: 0
FEVER: 0
OCCASIONAL FEELINGS OF UNSTEADINESS: 0
PALPITATIONS: 0
DIARRHEA: 0
FLANK PAIN: 0
HEMATURIA: 0
MYALGIAS: 0
NAUSEA: 0
APPETITE CHANGE: 0
LOSS OF SENSATION IN FEET: 0
BLOOD IN STOOL: 0
VOMITING: 0
RHINORRHEA: 0
SINUS PAIN: 0
CONSTIPATION: 0
LIGHT-HEADEDNESS: 0
DEPRESSION: 0
FATIGUE: 0
ARTHRALGIAS: 0
DIZZINESS: 0

## 2023-10-02 ASSESSMENT — SOCIAL DETERMINANTS OF HEALTH (SDOH)
WITHIN THE LAST YEAR, HAVE YOU BEEN AFRAID OF YOUR PARTNER OR EX-PARTNER?: NO
IN THE PAST 12 MONTHS, HAS THE ELECTRIC, GAS, OIL, OR WATER COMPANY THREATENED TO SHUT OFF SERVICE IN YOUR HOME?: NO
DO YOU BELONG TO ANY CLUBS OR ORGANIZATIONS SUCH AS CHURCH GROUPS UNIONS, FRATERNAL OR ATHLETIC GROUPS, OR SCHOOL GROUPS?: NO
HOW OFTEN DO YOU ATTENT MEETINGS OF THE CLUB OR ORGANIZATION YOU BELONG TO?: NEVER
WITHIN THE LAST YEAR, HAVE YOU BEEN KICKED, HIT, SLAPPED, OR OTHERWISE PHYSICALLY HURT BY YOUR PARTNER OR EX-PARTNER?: NO
HOW OFTEN DO YOU GET TOGETHER WITH FRIENDS OR RELATIVES?: THREE TIMES A WEEK
IN A TYPICAL WEEK, HOW MANY TIMES DO YOU TALK ON THE PHONE WITH FAMILY, FRIENDS, OR NEIGHBORS?: MORE THAN THREE TIMES A WEEK
WITHIN THE LAST YEAR, HAVE YOU BEEN HUMILIATED OR EMOTIONALLY ABUSED IN OTHER WAYS BY YOUR PARTNER OR EX-PARTNER?: NO
HOW OFTEN DO YOU ATTEND CHURCH OR RELIGIOUS SERVICES?: MORE THAN 4 TIMES PER YEAR
WITHIN THE LAST YEAR, HAVE TO BEEN RAPED OR FORCED TO HAVE ANY KIND OF SEXUAL ACTIVITY BY YOUR PARTNER OR EX-PARTNER?: NO
HOW HARD IS IT FOR YOU TO PAY FOR THE VERY BASICS LIKE FOOD, HOUSING, MEDICAL CARE, AND HEATING?: NOT HARD AT ALL

## 2023-10-02 ASSESSMENT — PATIENT HEALTH QUESTIONNAIRE - PHQ9
1. LITTLE INTEREST OR PLEASURE IN DOING THINGS: NOT AT ALL
SUM OF ALL RESPONSES TO PHQ9 QUESTIONS 1 AND 2: 0
2. FEELING DOWN, DEPRESSED OR HOPELESS: NOT AT ALL

## 2023-10-02 ASSESSMENT — ACTIVITIES OF DAILY LIVING (ADL)
TAKING_MEDICATION: INDEPENDENT
DRESSING: INDEPENDENT
GROCERY_SHOPPING: INDEPENDENT
DOING_HOUSEWORK: INDEPENDENT
MANAGING_FINANCES: INDEPENDENT
BATHING: INDEPENDENT

## 2023-10-02 ASSESSMENT — LIFESTYLE VARIABLES
HOW OFTEN DO YOU HAVE A DRINK CONTAINING ALCOHOL: NEVER
HOW MANY STANDARD DRINKS CONTAINING ALCOHOL DO YOU HAVE ON A TYPICAL DAY: PATIENT DOES NOT DRINK

## 2023-10-02 NOTE — PROGRESS NOTES
Subjective   Antonia Rodriguez is a 68 y.o. female who is here for a routine exam.  Active Problem List      Comprehensive Medical/Surgical/Social/Family History  Past Medical History:   Diagnosis Date    Personal history of other medical treatment     History of echocardiogram     Past Surgical History:   Procedure Laterality Date    OTHER SURGICAL HISTORY  03/03/2022    Cardiac catheterization with stent placement    OTHER SURGICAL HISTORY  07/22/2022    Cardioverter defibrillator insertion     Social History     Social History Narrative    Not on file         Allergies and Medications  Iodinated contrast media, Other, and Penicillins  Current Outpatient Medications on File Prior to Visit   Medication Sig Dispense Refill    ascorbic acid, vitamin C, 500 mg capsule Take by mouth.      aspirin 81 mg EC tablet Take 1 tablet (81 mg) by mouth once daily.      atorvastatin (Lipitor) 80 mg tablet Take 1 tablet (80 mg) by mouth once daily.      cyanocobalamin (Vitamin B-12) 100 mcg tablet Take by mouth.      dapagliflozin (Farxiga) 10 mg Take 1 tablet (10 mg) by mouth once daily. 90 tablet 2    dulaglutide (Trulicity) 1.5 mg/0.5 mL pen injector injection Inject 1.5 mg under the skin 1 (one) time per week. 2 mL 3    ergocalciferol (Vitamin D-2) 1.25 MG (04887 UT) capsule Take by mouth.      fluticasone (Flonase) 50 mcg/actuation nasal spray Administer 1 spray into affected nostril(s) once daily.      guaiFENesin (Mucinex) 600 mg 12 hr tablet Take 2 tablets (1,200 mg) by mouth 2 times a day. Do not crush, chew, or split. 30 tablet 0    isosorbide mononitrate ER (Imdur) 60 mg 24 hr tablet Take 1 tablet (60 mg) by mouth once daily.      mupirocin (Bactroban) 2 % ointment Apply 1 Application topically 3 times a day. as directed      nitroglycerin (NitrolinguaL) 400 mcg/spray spray Place 1 spray under the tongue if needed for chest pain.      sacubitriL-valsartan (Entresto) 24-26 mg tablet Take 1 tablet by mouth 2 times a day.       sotalol (Betapace) 80 mg tablet Take 1 tablet (80 mg) by mouth every 12 hours.      spironolactone (Aldactone) 25 mg tablet Take 1 tablet (25 mg) by mouth once daily.      busPIRone (Buspar) 5 mg tablet Take 3 tablets (15 mg) by mouth 2 times a day as needed (As needed for anxiety). 60 tablet 1    ticagrelor (Brilinta) 90 mg tablet Take 1 tablet (90 mg) by mouth 2 times a day.       No current facility-administered medications on file prior to visit.       New Concerns: No new concerns, she is continue to follow-up with cardiology, states from a heart standpoint she is doing well, she still gets intermittent chest pains but there is significantly improved and less common.  She states that she does get lightheaded and dizzy with increased activity.    States that she does know that she has significantly low blood pressure but is unable to change medications with this.    Otherwise living very healthy lifestyle.    No changes in terms of diabetic medications, she has had slight increase in blood sugars recently states that she has been slightly increasing carbohydrates in her diet.    No other significant changes, mild neuropathy which she states has been persistent.    Lifestyle  Diet:  Healthy, Well Balanced, Diabetic, carb-controlled, Limits red meats, and Home-cooked  Physical Activity: Sufficiently Active (10/2/2023)    Exercise Vital Sign     Days of Exercise per Week: 5 days     Minutes of Exercise per Session: 90 min      Tobacco Use: Low Risk  (10/2/2023)    Patient History     Smoking Tobacco Use: Never     Smokeless Tobacco Use: Never     Passive Exposure: Not on file      Alcohol Use: Not At Risk (10/2/2023)    AUDIT-C     Frequency of Alcohol Consumption: Never     Average Number of Drinks: Patient does not drink     Frequency of Binge Drinking: Not on file      Depression: Not at risk (10/2/2023)    PHQ-2     PHQ-2 Score: 0      Stress: No Stress Concern Present (10/2/2023)    [facility]  "Occupational Health - Occupational Stress Questionnaire     Feeling of Stress : Not at all       Consultants:  Cardiology - Merlin Laureano      Colorectal Screening:   cologuard  Date: 2021    Breast Screening: Patient defers  History of abnormal mammogram: no  Family history of breast cancer: no    Pap Smear: Not indicated due to age  Abnormal uterine bleeding: None  Urinary incontinence: None    Dexa Scan: due today    Review of Systems   Constitutional:  Negative for appetite change, fatigue and fever.   HENT:  Negative for ear discharge, ear pain, hearing loss, postnasal drip, rhinorrhea and sinus pain.    Eyes:  Negative for visual disturbance.   Respiratory:  Negative for shortness of breath.    Cardiovascular:  Negative for chest pain, palpitations and leg swelling.   Gastrointestinal:  Negative for abdominal pain, blood in stool, constipation, diarrhea, nausea and vomiting.   Genitourinary:  Negative for flank pain and hematuria.   Musculoskeletal:  Negative for arthralgias and myalgias.   Skin:  Negative for rash.   Neurological:  Negative for dizziness and light-headedness.   All other systems reviewed and are negative.      Objective   BP 86/55 (BP Location: Right arm, Patient Position: Sitting)   Pulse 88   Temp 36.4 °C (97.6 °F) (Temporal)   Resp 16   Ht 1.676 m (5' 6\")   Wt 78.2 kg (172 lb 8 oz)   SpO2 97%   BMI 27.84 kg/m²     Physical Exam  Vitals reviewed.   Constitutional:       General: She is not in acute distress.     Appearance: Normal appearance. She is normal weight. She is not toxic-appearing.   HENT:      Head: Normocephalic and atraumatic.      Right Ear: Tympanic membrane and ear canal normal.      Left Ear: Tympanic membrane and ear canal normal.      Nose: Nose normal. No congestion or rhinorrhea.      Mouth/Throat:      Mouth: Mucous membranes are dry.   Eyes:      General: No scleral icterus.     Extraocular Movements: Extraocular movements intact.      Conjunctiva/sclera: " Conjunctivae normal.      Pupils: Pupils are equal, round, and reactive to light.   Cardiovascular:      Rate and Rhythm: Normal rate and regular rhythm.      Heart sounds: No murmur heard.     No friction rub. No gallop.   Pulmonary:      Effort: Pulmonary effort is normal. No respiratory distress.      Breath sounds: Normal breath sounds. No wheezing, rhonchi or rales.   Abdominal:      General: Abdomen is flat. There is no distension.      Palpations: Abdomen is soft.      Tenderness: There is no abdominal tenderness. There is no guarding.   Musculoskeletal:         General: Normal range of motion.      Cervical back: Normal range of motion and neck supple.      Right lower leg: No edema.      Left lower leg: No edema.   Lymphadenopathy:      Cervical: No cervical adenopathy.   Skin:     General: Skin is warm and dry.   Neurological:      General: No focal deficit present.      Mental Status: She is alert and oriented to person, place, and time.   Psychiatric:         Mood and Affect: Mood normal.         Behavior: Behavior normal.         Assessment/Plan   Problem List Items Addressed This Visit       Chronic combined systolic and diastolic heart failure (CMS/HCC)    Diabetes mellitus (CMS/HCC)    Relevant Orders    TSH with reflex to Free T4 if abnormal    Hemoglobin A1C    Comprehensive metabolic panel    Diabetic polyneuropathy associated with type 2 diabetes mellitus (CMS/HCC)     Other Visit Diagnoses       Routine general medical examination at health care facility    -  Primary            Reviewed Social Determinants of health with patient, discussed healthy lifestyle including 150 minutes of physical activity per week  Ordered/Reviewed baseline labwork -CBC, CMP, Lipid Panel  Immunizations:  Patient deferred pneumonia, flu, COVID immunizations  Mammogram patient defers at this time  Dexa up-to-date screening  Colorectal Screen up-to-date Cologuard 2021    Continue medications previously prescribed  We  will recheck patient's hemoglobin A1c, TSH for persistent chronic illnesses.    No changes to medications at this time.  I will call with results, follow-up as new concerns arise

## 2023-10-09 ENCOUNTER — TELEPHONE (OUTPATIENT)
Dept: CARDIOLOGY | Facility: CLINIC | Age: 68
End: 2023-10-09
Payer: COMMERCIAL

## 2023-10-10 NOTE — TELEPHONE ENCOUNTER
"Maryjo calls today again, (you may have a previous note, she spoke with Harini),  She had leg swelling --\"pitting edema\" and chest numbness - felt like her neuropathy in feet feels.  She doubled up on spironolactone and symptoms have resolved.  She wanted you to know and see if there is anything else you want her to do should this occur again   "

## 2023-10-13 DIAGNOSIS — I50.42 CHRONIC COMBINED SYSTOLIC AND DIASTOLIC HEART FAILURE (MULTI): Primary | ICD-10-CM

## 2023-10-13 RX ORDER — FUROSEMIDE 40 MG/1
40 TABLET ORAL DAILY
Qty: 20 TABLET | Refills: 3 | Status: SHIPPED | OUTPATIENT
Start: 2023-10-13 | End: 2024-10-12

## 2023-10-13 NOTE — TELEPHONE ENCOUNTER
Spoke to the patient.  She has doubled up on her spironolactone for the last 5 days.  Her leg swelling has gone back down to normal when she is not having any numbness or chest discomfort.    She does not recall eating any saltier than usual or having any more fluids than usual.    I wrote a prescription for furosemide 40 mg to be used once daily as needed for leg swelling.  She should go back to single dose spironolactone 25 mg daily.

## 2023-10-16 ENCOUNTER — TELEPHONE (OUTPATIENT)
Dept: CARDIOLOGY | Facility: CLINIC | Age: 68
End: 2023-10-16
Payer: COMMERCIAL

## 2023-10-16 NOTE — TELEPHONE ENCOUNTER
Maryjo called today; she wanted to thank you and to let you know that since taking the Lasix her balance is better, the swelling went down and her breathing has improved.

## 2023-10-20 PROBLEM — G47.30 SLEEP-RELATED BREATHING DISORDER: Status: ACTIVE | Noted: 2023-10-20

## 2023-10-20 PROBLEM — G47.33 OSA (OBSTRUCTIVE SLEEP APNEA): Status: ACTIVE | Noted: 2023-10-20

## 2023-10-20 NOTE — PATIENT INSTRUCTIONS
[facility] Sleep Medicine   [place]  [facility]  [address] [place] [address]       NAME: Antonia Rodriguez       Your Sleep Provider Today: Kj Anthony APRN-CNP  Your Primary Care Physician: Ricco Cuevas, DO   Your Referring Provider: Dr. Wick      Dear Antonia Rodriguez,    Great to meet you today.  Thank you for visiting  Sleep Medicine Clinic !     1. According to your symptom and sleep study report. I will refer you to a myofunctional therapy, order the new PAP device for you to control your sleep apnea.    2. Please do not drive when you are sleepy and  start practicing the sleep hygiene  as discussed in clinic today.     3.  Please call my office with issues or questions: [phone] ([place]); [phone] ([facility])      In the event that you are running more than 10 minutes late to your appointment, I will kindly ask you to reschedule. Thanks.    Follow-up Appointment: RTC  3 months to Dr. Frost      TREATMENT PLAN     Referrals:  We are referring you to the following:   Refer to speech therapy for myofunctional therapy.      PATIENT EDUCATION     Obstructive Sleep Apnea (JOSE ARMANDO) is a sleep disorder where your upper airway muscles relax during sleep and the airway intermittently and repetitively narrows and collapses leading to partially blocked airway (hypopnea) or completely blocked airway (apnea) which, in turn, can disrupt breathing in sleep, lower oxygen levels while you sleep and cause night time wakings. Because both apnea and hypopnea may cause higher carbon dioxide or low oxygen levels, untreated JOSE ARMANDO can lead to heart arrhythmia, elevation of blood pressure, and make it harder for the body to consolidate memory and facilitate metabolism (leading to higher blood sugars at night). Frequent partial arousals occur during sleep resulting in sleep deprivation and daytime sleepiness. JOSE ARMANDO is associated with an increased risk of cardiovascular  disease, stroke, hypertension, and insulin resistance. Moreover, untreated JOSE ARMANDO with excessive daytime sleepiness can increase the risk of motor vehicular accidents.    Some conservative strategies for JOSE ARMANDO regardless of JOSE ARMANDO severity are:   Positional therapy - Avoid sleeping on your back.   Healthy diet and regular exercise to optimize weight is highly encouraged.   Avoid alcohol late in the evening and sedative-hypnotics as these substances can make sleep apnea worse.   Improve breathing through the nose with intranasal steroid spray, saline rinse, or antihistamines    Safety: Avoid driving vehicle and operating heavy equipment while sleepy. Drowsy driving may lead to life-threatening motor vehicle accidents. A person driving while sleepy is 5 times more likely to have an accident. If you feel sleepy, pull over and take a short power nap (sleep for less than 30 minutes). Otherwise, ask somebody to drive you.    Treatment options for sleep apnea include weight management, positional therapy, Positive Airway Therapy (PAP) therapy, oral appliance therapy, hypoglossal nerve stimulator (Inspire) and select airway surgeries.    You can also go to the following EDUCATION WEBSITES for further information:   American Academy of Sleep Medicine http://sleepeducation.org  National Sleep Foundation: https://sleepfoundation.org  American Sleep Apnea Association: https://www.sleepapnea.org (for patients with sleep apnea)  Narcolepsy Network: https://www.narcolepsynetwork.org (for patients with narcolepsy)  [employer]: https://www.wakeupnarcolepsy.org (for patients with narcolepsy)  Hypersomnia Foundation: https://www.hypersomniafoundation.org (for patients with idiopathic hypersomnia)  RLS foundation: https://www.rls.org (for patients with restless leg syndrome)      IMPORTANT INFORMATION     Call 911 for medical emergencies.  Our offices are generally open from Monday-Friday, 9 am - 5 pm.  If you need to get in touch  with me, you may either call me and my team(number is below) or you can use [employer].  If a referral for a test, for CPAP, or for another specialist was made, and you have not heard about scheduling this within a week, please call scheduling at [phone] ([phone]).  If you are unable to make your appointment for clinic or an overnight study, kindly call the office at least 48 hours in advance to cancel and reschedule.  If you are on CPAP, please bring your device's card or the device to each clinic appointment.   There are no supporting services by either the sleep doctors or their staff on weekends and Holidays, or after 5 PM on weekdays.   If you have been asked to come to a sleep study, make sure you bring toiletries, a comfy pillow, and any nighttime medications that you may regularly take. Also be sure to eat dinner before you arrive. We generally do not provide meals.      PRESCRIPTIONS     We require 7 days advanced notice for prescription refills. If we do not receive the request in this time, we cannot guarantee that your medication will be refilled in time.      IMPORTANT PHONE NUMBERS     Sleep Medicine Clinic Fax: [phone]  Appointments (for Pediatric Sleep Clinic): [phone] - option 1  Appointments (for Adult Sleep Clinic): [phone] - option 2  Appointments (For Sleep Studies): [phone] - option 3  Behavioral Sleep Medicine: [phone]  Sleep Surgery: [phone]  ENT (Otolaryngology): [phone]  Headache Clinic (Neurology): [phone]  Neurology: [phone]  Psychiatry: [phone]  Pulmonary Function Testing (PFT) Center: [phone]  Pulmonary Medicine: [phone]  [employer] (DME): [phone]  [employer] (DME): [phone]  [facility] ([facility]): [phone]      OUR SLEEP TESTING LOCATIONS     Our team will contact you to schedule your sleep study, however, you can contact us as follow:  Main Phone Line (scheduling  "only): [phone]), option 3  Adult and Pediatric Locations   [place] (6 years and older): [facility] - 4th floor ([address] [facility] [facility]) After hours line: [phone]  [facility] (Main campus: All ages): [facility], 6th floor. After hours line: [phone]   [place] (5 years and older; younger considered on case-by-case basis): [address]; [address], [address]. Scheduling  After hours line: [phone]   [place] (6 years and older): [address]; [address]; [address]   [place] (6 years and older): [address] [facility], [address]  After hours line: [phone]   [place] (13 years and older) in [place]: [address], 2nd floor  After hours line: [phone]   [place] (13 year and older): [address]  After hours line: [phone]     Adult Only Locations:   [place] (18 years and older): [address] [facility], 2nd floor   [place] (18 years and older): [address] [facility]; 4th floor  After hours line: [phone]   [place] (18 years and older) at [place]: [id number] [place] [address]  After hours line: [phone]      CONTACTING YOUR SLEEP MEDICINE PROVIDER     Send a message directly to your provider through \"My Chart\", which is the email service through your  Records Account: [email or web address]   Call [phone] and leave a message. One of the administrative assistants will forward the message to your sleep medicine provider through \"My Chart\" and/or email.     Your sleep medicine provider for this visit was: LUCY HernandezCNP       "

## 2023-10-20 NOTE — PROGRESS NOTES
Patient: Antonia Rodriguez    [id number]  : 1955 -- AGE 68 y.o.    Provider: AYE Hernandez     Location [facility] Medicine [facility]  Followup Visit Note    HISTORY OF PRESENT ILLNESS     The patient's referring provider is: Dr. Wick       HISTORY OF PRESENT ILLNESS     10/23/23: UPDATED: Antonia Rodriguezreturned 68 year F with pmhx including overweight, HTN, CAD s/p MI and stents, pacemaker/ defibrillator , paroxysmal SVT, allergic rhinitis, heart failure, and sleep disorder breathing. Today, she returned to the clinic to review her in-lab sleep study completed on 23 revealed RDI4%: 1.8, and Bob: 89%. Patient has had snoring and night time awakenings for years. she has not tried any interventions.  Her ESS: 4 today.     Dr. Wick saw Antonia Rodriguez last on 23 and ordered sleep study    Current History    On today's visit, the patient returned to the clinic to review her sleep study.    Sleep Schedule:   Weekdays/Work/School Days: usual bed time: 10pm, usual wake time: 7am and falls asleep at about: 10:30 pm.   Weekends/Off Work/Vacation Days: same as above schedule.   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 walks a lot.   SLEEP DURATION: 8hrs.   SLEEP INITIATION: Takes 30min to fall asleep.   SLEEP MAINTENANCE: easily returns to sleep after waking and wakes up about two - three times a night.       Daytime Symptoms    Problems staying asleep associated with nocturia.   BREATHING DURING SLEEP: Snoring during sleep.  Nocturnal cough. sometimes .   Sleep Positioning: prone and side.   MORNING SYMPTOMS: Morning dry mouth.   DAYTIME: Daytime sleepiness.  Fatigue.  No drowsy driving. No history of car accidents due to drowsy driving.   Naps:. 1 hr daily, varies in time. Naps : Y, refreshing.  Fatigue: mild    ESS: 4    REVIEW OF SYSTEMS: All other systems have been reviewed and are  negative.       PAST HISTORY, ALLERGIES ,MEDICATIONS     Active Problems, Allergy List, Medication List, and PMH/PSH/FH/Social Hx have been reviewed and reconciled in chart. No significant changes unless documented in the pertinent chart section. Updates made when necessary.     FAMILY HISTORY  DOES/DOES NOT EC: unknown have a family history of sleep disorder.    SOCIAL HISTORY  She  reports that she has never smoked. She has never used smokeless tobacco. She reports that she does not drink alcohol and does not use drugs. CAFFEINE USAGE: 1-2 cups coffee in am.       PHYSICAL EXAM     VITAL SIGNS:   Vitals:    10/23/23 1050   BP: 96/55   Pulse: 73   Resp: 18           Physical Exam  PHYSICAL EXAM: GENERAL: alert pleasant and cooperative no acute distress  CHEST EXAM: breath sounds are clear to auscultation bilaterally without rales, rhonchi, or wheezes  CARDIAC EXAM: Heart sounds are normal.  Regular rate and rhythm without murmur, rubs, or gallop        ASSESSMENT/PLAN     Antonia Rodriguez with the following problems:     OBSTRUCTIVE SLEEP APNEA: RDI 3%: 10.7,RDI 4%: 1.8  -According to CMS criteria, Antonia is not eligible for PAP treatment.   -Sleep apnea and OPTIONAL therapy education were provided at length in the clinic today. Antonia Rodriguez verbalized understanding.  -Refer to speech therapy for myofunctional therapy.  -Emphasized diet, exercise, and weight loss in the clinic, as were non-supine sleep, avoiding alcohol in the late evening, and driving or operating heavy machinery when sleepy.  Antonia Rodriguezverbalizes understanding of the above instructions and risks.      OVERWEIGHT:  -with a BMI of 27.84. Antonia Rodriguez most recent Bicarbonate was 27 in Oct/2023.  -counseled on eating a healthy diet.    HYPERTENSION/SVT:  -BP was 96/55 in the clinic  -Denies headache, palpitation, or syncope in the clinic  -F/U with PCP/cardiology.    RTC  3 months to see Dr. Frost

## 2023-10-21 DIAGNOSIS — I50.42 CHRONIC COMBINED SYSTOLIC (CONGESTIVE) AND DIASTOLIC (CONGESTIVE) HEART FAILURE (MULTI): ICD-10-CM

## 2023-10-23 ENCOUNTER — OFFICE VISIT (OUTPATIENT)
Dept: SLEEP MEDICINE | Facility: CLINIC | Age: 68
End: 2023-10-23
Payer: COMMERCIAL

## 2023-10-23 VITALS
DIASTOLIC BLOOD PRESSURE: 55 MMHG | SYSTOLIC BLOOD PRESSURE: 96 MMHG | HEIGHT: 66 IN | WEIGHT: 172 LBS | BODY MASS INDEX: 27.64 KG/M2 | HEART RATE: 73 BPM | RESPIRATION RATE: 18 BRPM

## 2023-10-23 DIAGNOSIS — E66.3 OVERWEIGHT (BMI 25.0-29.9): ICD-10-CM

## 2023-10-23 DIAGNOSIS — G47.33 OSA (OBSTRUCTIVE SLEEP APNEA): Primary | ICD-10-CM

## 2023-10-23 DIAGNOSIS — I10 HYPERTENSION, UNSPECIFIED TYPE: ICD-10-CM

## 2023-10-23 PROCEDURE — 3078F DIAST BP <80 MM HG: CPT

## 2023-10-23 PROCEDURE — 3074F SYST BP LT 130 MM HG: CPT

## 2023-10-23 PROCEDURE — 3051F HG A1C>EQUAL 7.0%<8.0%: CPT

## 2023-10-23 PROCEDURE — 1036F TOBACCO NON-USER: CPT

## 2023-10-23 PROCEDURE — 99214 OFFICE O/P EST MOD 30 MIN: CPT

## 2023-10-23 PROCEDURE — 1159F MED LIST DOCD IN RCRD: CPT

## 2023-10-23 PROCEDURE — 1160F RVW MEDS BY RX/DR IN RCRD: CPT

## 2023-10-23 PROCEDURE — 1126F AMNT PAIN NOTED NONE PRSNT: CPT

## 2023-10-23 ASSESSMENT — COLUMBIA-SUICIDE SEVERITY RATING SCALE - C-SSRS
6. HAVE YOU EVER DONE ANYTHING, STARTED TO DO ANYTHING, OR PREPARED TO DO ANYTHING TO END YOUR LIFE?: NO
1. IN THE PAST MONTH, HAVE YOU WISHED YOU WERE DEAD OR WISHED YOU COULD GO TO SLEEP AND NOT WAKE UP?: NO
2. HAVE YOU ACTUALLY HAD ANY THOUGHTS OF KILLING YOURSELF?: NO

## 2023-10-23 ASSESSMENT — SLEEP AND FATIGUE QUESTIONNAIRES
HOW LIKELY ARE YOU TO NOD OFF OR FALL ASLEEP WHEN YOU ARE A PASSENGER IN A CAR FOR AN HOUR WITHOUT A BREAK: SLIGHT CHANCE OF DOZING
HOW LIKELY ARE YOU TO NOD OFF OR FALL ASLEEP WHILE SITTING AND READING: WOULD NEVER DOZE
HOW LIKELY ARE YOU TO NOD OFF OR FALL ASLEEP IN A CAR, WHILE STOPPED FOR A FEW MINUTES IN TRAFFIC: WOULD NEVER DOZE
HOW LIKELY ARE YOU TO NOD OFF OR FALL ASLEEP WHILE LYING DOWN TO REST IN THE AFTERNOON WHEN CIRCUMSTANCES PERMIT: SLIGHT CHANCE OF DOZING
ESS-CHAD TOTAL SCORE: 4
SITING INACTIVE IN A PUBLIC PLACE LIKE A CLASS ROOM OR A MOVIE THEATER: WOULD NEVER DOZE
HOW LIKELY ARE YOU TO NOD OFF OR FALL ASLEEP WHILE SITTING AND TALKING TO SOMEONE: WOULD NEVER DOZE
HOW LIKELY ARE YOU TO NOD OFF OR FALL ASLEEP WHILE WATCHING TV: SLIGHT CHANCE OF DOZING
HOW LIKELY ARE YOU TO NOD OFF OR FALL ASLEEP WHILE SITTING QUIETLY AFTER LUNCH WITHOUT ALCOHOL: SLIGHT CHANCE OF DOZING

## 2023-10-23 ASSESSMENT — ENCOUNTER SYMPTOMS
OCCASIONAL FEELINGS OF UNSTEADINESS: 0
LOSS OF SENSATION IN FEET: 1
DEPRESSION: 0

## 2023-10-23 ASSESSMENT — PATIENT HEALTH QUESTIONNAIRE - PHQ9
SUM OF ALL RESPONSES TO PHQ9 QUESTIONS 1 AND 2: 0
1. LITTLE INTEREST OR PLEASURE IN DOING THINGS: NOT AT ALL
2. FEELING DOWN, DEPRESSED OR HOPELESS: NOT AT ALL

## 2023-10-23 ASSESSMENT — PAIN SCALES - GENERAL: PAINLEVEL: 0-NO PAIN

## 2023-10-24 ENCOUNTER — TELEPHONE (OUTPATIENT)
Dept: CARDIOLOGY | Facility: CLINIC | Age: 68
End: 2023-10-24

## 2023-10-24 DIAGNOSIS — I10 ESSENTIAL (PRIMARY) HYPERTENSION: ICD-10-CM

## 2023-10-24 DIAGNOSIS — I50.42 CHRONIC COMBINED SYSTOLIC (CONGESTIVE) AND DIASTOLIC (CONGESTIVE) HEART FAILURE (MULTI): ICD-10-CM

## 2023-10-24 RX ORDER — ASPIRIN 81 MG/1
81 TABLET ORAL DAILY
Qty: 90 TABLET | Refills: 1 | Status: SHIPPED | OUTPATIENT
Start: 2023-10-24 | End: 2024-01-23

## 2023-10-25 ENCOUNTER — TELEPHONE (OUTPATIENT)
Dept: PRIMARY CARE | Facility: CLINIC | Age: 68
End: 2023-10-25
Payer: COMMERCIAL

## 2023-10-25 DIAGNOSIS — E11.9 TYPE 2 DIABETES MELLITUS WITHOUT COMPLICATION, WITHOUT LONG-TERM CURRENT USE OF INSULIN (MULTI): ICD-10-CM

## 2023-10-25 RX ORDER — SPIRONOLACTONE 25 MG/1
25 TABLET ORAL DAILY
Qty: 90 TABLET | Refills: 1 | OUTPATIENT
Start: 2023-10-25

## 2023-10-25 RX ORDER — SOTALOL HYDROCHLORIDE 120 MG/1
120 TABLET ORAL 2 TIMES DAILY
Qty: 180 TABLET | Refills: 2 | Status: SHIPPED | OUTPATIENT
Start: 2023-10-25

## 2023-10-25 RX ORDER — DULAGLUTIDE 1.5 MG/.5ML
1.5 INJECTION, SOLUTION SUBCUTANEOUS
Qty: 2 ML | Refills: 3 | Status: SHIPPED | OUTPATIENT
Start: 2023-10-25 | End: 2024-01-25 | Stop reason: DRUGHIGH

## 2023-11-02 ENCOUNTER — HOSPITAL ENCOUNTER (OUTPATIENT)
Dept: CARDIOLOGY | Facility: HOSPITAL | Age: 68
Discharge: HOME | End: 2023-11-02
Payer: COMMERCIAL

## 2023-11-02 DIAGNOSIS — Z95.810 ICD (IMPLANTABLE CARDIOVERTER-DEFIBRILLATOR) IN PLACE: ICD-10-CM

## 2023-11-02 DIAGNOSIS — I25.5 ISCHEMIC CARDIOMYOPATHY: ICD-10-CM

## 2023-11-02 DIAGNOSIS — I50.42 CHRONIC COMBINED SYSTOLIC (CONGESTIVE) AND DIASTOLIC (CONGESTIVE) HEART FAILURE (MULTI): ICD-10-CM

## 2023-11-02 DIAGNOSIS — Z95.810 ICD (IMPLANTABLE CARDIOVERTER-DEFIBRILLATOR) IN PLACE: Primary | ICD-10-CM

## 2023-11-02 PROCEDURE — 93296 REM INTERROG EVL PM/IDS: CPT

## 2023-11-02 RX ORDER — SPIRONOLACTONE 25 MG/1
25 TABLET ORAL DAILY
Qty: 90 TABLET | Refills: 3 | Status: SHIPPED | OUTPATIENT
Start: 2023-11-02 | End: 2024-05-30 | Stop reason: WASHOUT

## 2023-11-02 RX ORDER — SPIRONOLACTONE 25 MG/1
25 TABLET ORAL DAILY
Qty: 90 TABLET | Refills: 3 | Status: SHIPPED | OUTPATIENT
Start: 2023-11-02 | End: 2023-12-14 | Stop reason: SDUPTHER

## 2023-11-09 ENCOUNTER — EVALUATION (OUTPATIENT)
Dept: SPEECH THERAPY | Facility: CLINIC | Age: 68
End: 2023-11-09
Payer: COMMERCIAL

## 2023-11-09 DIAGNOSIS — G47.33 OSA (OBSTRUCTIVE SLEEP APNEA): ICD-10-CM

## 2023-11-09 PROCEDURE — 92507 TX SP LANG VOICE COMM INDIV: CPT | Mod: GN | Performed by: SPEECH-LANGUAGE PATHOLOGIST

## 2023-11-09 PROCEDURE — 92522 EVALUATE SPEECH PRODUCTION: CPT | Mod: GN | Performed by: SPEECH-LANGUAGE PATHOLOGIST

## 2023-11-09 ASSESSMENT — PAIN - FUNCTIONAL ASSESSMENT: PAIN_FUNCTIONAL_ASSESSMENT: 0-10

## 2023-11-09 ASSESSMENT — PAIN SCALES - GENERAL: PAINLEVEL_OUTOF10: 0 - NO PAIN

## 2023-11-09 NOTE — PROGRESS NOTES
"Speech-Language Pathology    SLP ADULT Outpatient Speech Evaluation    Patient Name: Antonia Rodriguez  MRN: [id number]  Today's Date: 11/9/2023      Time Calculation  Start Time: 1605  Stop Time: 1650  Time Calculation (min): 45 min      Current Problem:   1. JOSE ARMANDO (obstructive sleep apnea)  Referral to Speech Therapy            SLP Assessment:  SLP Assessment  Patient presents with oromyofunctional deficits contributing to obstructive sleep apnea, upon completion of evaluation this date. Examination of oral mechanism was within functional limits for symmetry, strength, and range of motion; however, base of tongue collapse is expected to be contributing to periods of apnea, per patient's MD as reported by patient this date. As patient is not a candidate for PAP therapies, she was referred for alternative therapy involving oropharyngeal exercises and respiratory muscle strength training (RMST). Exercise program was introduced this date.    Of note, patient also reports some intermittent dysphagia, although was able to \"pass\" [place] Swallow Protocol without difficulty. Will monitor need for further assessment (I.e., modified barium swallow study (MBSS) vs Fiberoptic Endoscopic Evaluation of Swallowing (FEES).    Treatment is warranted in order to promote improvement in sleep-related outcomes affecting quality of life.    SLP Assessment Results: Other (Comment) (Oromyofunctional deficits resulting in sleep apnea)  Prognosis: Excellent  Treatment Provided: Yes. Treatment was provided following evaluation. Patient was provided training and instruction in recommended oropharyngeal exercises, with demonstration and specific rationale for exercises discussed throughout. Patient was also provided with The Breather, and was educated regarding respiratory muscle strength training (RMST). Device was calibrated with patient. Patient was encouraged to follow up in 2 weeks for monitoring of exercise program.   Treatment Tolerance: " "Patient tolerated treatment well  Strengths: Cognition, Motivation  Barriers: None  Education Provided: Yes      SLP Plan:  Plan  Inpatient/Swing Bed or Outpatient: Outpatient  Treatment/Interventions: Other (Comment) (Oropharyngeal exercises, respiratory muscle strength training (RMST))  SLP Plan: Skilled SLP  SLP Frequency: Every other week  Duration: 12 weeks  SLP Discharge Recommendations: Continue home program upon D/C  Equipment Recommended: The Breather  Next Treatment Priority: Follow up regarding exercise program, monitor dysphagia symptoms  Discussed POC: Patient  Patient/Caregiver Agreeable: Yes    Goals:  Short-term goals:  Patient will complete recommended oropharyngeal exercises at recommended frequency (10 repetitions, 2x/day), as observed in follow-up sessions and per patient report.  Patient will complete respiratory muscle strength training (RMST) at recommended frequency (2 sets of 10 breaths, 2x/day), as observed in follow-up sessions and per patient report.    Long-term goal:  Patient will demonstrate improvement on the [place] Sleepiness Scale and [place] Sleep Quality Index following treatment, as a measure of improvement in sleep-related quality of life.    Subjective   Patient is a 68 year-old female presenting to [facility] this date to be assessed for oromyofunctional treatments of obstructive sleep apnea (JOSE ARMANDO), upon referral from Sleep Medicine NP Kj Anthony. Patient underwent sleep study on 9/11/23, and was found to have mild JOSE ARMANDO. Patient was not eligible for PAP therapy, and was referred to our clinic for alternative treatment approaches.     Patient denies significant daytime sleepiness or sleep disturbances, and does report some improvement since she has been sleeping on her side vs her back. She reports that she was initially referred for sleep study while inpatient for a heart attack around 2 years ago. Of note, patient does endorse dysphagia \"every now and then\", and notes that " she was on a respirator while in the hospital. Patient denies difficulty swallowing pills, but does have occasional trouble with food or liquids.    Most Recent Visit:  SLP Most Recent Visit  SLP Received On: 11/09/23      General Visit Information:  General Information  Chart Reviewed: Yes  Arrival: Independent  Reason for Referral: Obstructive sleep apnea  Referred By: AYE Hernandez  Past Medical History Relevant to Rehab: HTN, CAD s/p MI and stents, pacemaker/ defibrillator , paroxysmal SVT, allergic rhinitis, heart failure, and sleep disorder breathing  Patient Seen During This Visit: Yes  Certification Period Start Date: 11/09/23  Certification Period End Date: 02/06/24  Total Number of Visits : 1      Objective       Pain:  Pain Assessment  Pain Assessment: 0-10  Pain Score: 0 - No pain      Cognition:  Cognition  Overall Cognitive Status: Within Functional Limits       Motor Speech Production:  Motor Speech Production  Oral Motor : WFL  Intelligibility: WFL    SLP Outcome Measures:  EAT 10  My swallowing problem has caused me to lose weight.: 0  My swallowing problem interferes with my ability to go out for meals.: 0  Swallowing liquids takes extra effort.: 2  Swallowing solids takes extra effort.: 0  Swallowing pills takes extra effort.: 0  Swallowing is painful: 0  The pleasure of eating is affected by my swallowing.: 0  When I swallow food sticks in my throat.: 0  I cough when I eat.: 0  Swallowing is stressful: 0  EAT-10 TOTAL SCORE:: 2    [place] Sleepiness Scale: 8 (a score of 10 or greater raises concern for daytime sleepiness)         Outpatient Education:  Adult Outpatient Education  Individual(s) Educated: Patient  Written Education : Exercise program, RMST protocol  Verbal Education : Results of assessment, goals of treatment, plan of care  Patient Response to Education: Patient/Caregiver Verbalized Understanding of Information

## 2023-11-09 NOTE — Clinical Note
November 9, 2023     Patient: Antonia Rodriguez   YOB: 1955   Date of Visit: 11/9/2023       To Whom It May Concern:    It is my medical opinion that Antonia Rodriguez {Work release (duty restriction):88614}.    If you have any questions or concerns, please don't hesitate to call.         Sincerely,        Lety Duncan, CCC-SLP    CC: No Recipients

## 2023-11-09 NOTE — Clinical Note
November 9, 2023     Patient: Antonia Rodriguez   YOB: 1955   Date of Visit: 11/9/2023       To Whom it May Concern:    Antonia Rodriguez was seen in my clinic on 11/9/2023. She {Return to school/sport:00872}.    If you have any questions or concerns, please don't hesitate to call.         Sincerely,          Lety Duncan, CCC-SLP        CC: No Recipients

## 2023-11-16 PROCEDURE — 93295 DEV INTERROG REMOTE 1/2/MLT: CPT | Performed by: STUDENT IN AN ORGANIZED HEALTH CARE EDUCATION/TRAINING PROGRAM

## 2023-11-20 ENCOUNTER — TELEPHONE (OUTPATIENT)
Dept: CARDIOLOGY | Facility: CLINIC | Age: 68
End: 2023-11-20
Payer: COMMERCIAL

## 2023-11-20 NOTE — TELEPHONE ENCOUNTER
Patient called and her blood pressures have been 146/94, 133/91,136/89 she is worried to high please advise.    [phone]

## 2023-11-21 ENCOUNTER — HOSPITAL ENCOUNTER (OUTPATIENT)
Dept: CARDIOLOGY | Facility: HOSPITAL | Age: 68
Discharge: HOME | End: 2023-11-21
Payer: COMMERCIAL

## 2023-11-21 DIAGNOSIS — Z95.810 ICD (IMPLANTABLE CARDIOVERTER-DEFIBRILLATOR) IN PLACE: ICD-10-CM

## 2023-11-21 DIAGNOSIS — I25.5 ISCHEMIC CARDIOMYOPATHY: ICD-10-CM

## 2023-11-22 NOTE — TELEPHONE ENCOUNTER
Patient informed to up Entresto to 2 pills twice a day and if that doesn't help she will call us early next week

## 2023-12-01 ENCOUNTER — TREATMENT (OUTPATIENT)
Dept: SPEECH THERAPY | Facility: CLINIC | Age: 68
End: 2023-12-01
Payer: COMMERCIAL

## 2023-12-01 DIAGNOSIS — G47.33 OSA (OBSTRUCTIVE SLEEP APNEA): Primary | ICD-10-CM

## 2023-12-01 PROCEDURE — 92507 TX SP LANG VOICE COMM INDIV: CPT | Mod: GN | Performed by: SPEECH-LANGUAGE PATHOLOGIST

## 2023-12-01 ASSESSMENT — PAIN - FUNCTIONAL ASSESSMENT: PAIN_FUNCTIONAL_ASSESSMENT: 0-10

## 2023-12-01 ASSESSMENT — PAIN SCALES - GENERAL: PAINLEVEL_OUTOF10: 0 - NO PAIN

## 2023-12-01 NOTE — PROGRESS NOTES
"Speech-Language Pathology    Outpatient Speech-Language Pathology Treatment     Patient Name: Antonia Rodriguez  MRN: [id number]  Today's Date: 12/1/2023  Time Calculation  Start Time: 1445  Stop Time: 1522  Time Calculation (min): 37 min         Current Problem:   1. JOSE ARMANDO (obstructive sleep apnea)              SLP Assessment:  SLP TX Intervention Outcome: Making Progress Towards Goals  SLP Assessment Results: Other (Comment) (Oromyofunctional deficits resulting in sleep apnea)  Prognosis: Excellent  Treatment Provided: Yes     Patient     ST led discussion about patient's progress to date. Patient endorses that she has been sleeping better and is happy about the results of using the RMST device. She states that she feels like her speaking is better and she is \"less out of air\". She denies any recent difficulty with swallowing, and MBSS is not needed at this time.     Patient increased resistance on her RMST device during session one level for both inspiration and expiration. She demonstrated a set of 10 repetitions on the RMST device with accuracy. ST provided education on purpose of exercise and how the device works to strengthen inspiratory and expiratory muscles.     Patient is progressing and meeting goals at this date. Patient will follow up with ST services as needed.     Treatment Tolerance: Patient tolerated treatment well  Strengths: Cognition, Motivation  Barriers: None  Education Provided: Yes       Plan:  Inpatient/Swing Bed or Outpatient: Outpatient  Treatment/Interventions: Other (Comment) (respiratory muscle strength training (RMST))  SLP Plan: Skilled SLP, Other (Comment) (follow up as needed)  Duration:  (Follow up appointment if needed)  SLP Discharge Recommendations: Continue home program upon D/C  Discussed POC: Patient  Patient/Caregiver Agreeable: Yes      Subjective   Current Problem:  Patient reported she has been sleeping better and has been completing her RMST regimen at home and believes it " has helped her. She reported she completes the JOSE ARMANDO exercises at home.     Most Recent Visit:  SLP Received On: 12/01/23    General Visit Information:   Reason for Referral: Obstructive sleep apnea  Referred By: AYE Hernandez  Past Medical History Relevant to Rehab: HTN, CAD s/p MI and stents, pacemaker/ defibrillator , paroxysmal SVT, allergic rhinitis, heart failure, and sleep disorder breathing  Arrival: Independent  Certification Period Start Date: 11/09/23  Certification Period End Date: 02/06/24  Total Number of Visits : 2      Pain Assessment:   Pain Assessment: 0-10  Pain Score: 0 - No pain      Objective   Short-term goals:  Patient will complete recommended oropharyngeal exercises at recommended frequency (10 repetitions, 2x/day), as observed in follow-up sessions and per patient report.   - Patient reports she has been doing 2/3 recommended oropharyngeal exercises at home   Patient will complete respiratory muscle strength training (RMST) at recommended frequency (2 sets of 10 breaths, 2x/day), as observed in follow-up sessions and per patient report. GOAL MET  - Patient reports she has been completed her RMST exercises at level 3 for inspiration and level 2 for expiration; this was increased to level 4 for inspiration and level 3 for expiration during session.      Long-term goal:  Patient will demonstrate improvement on the [place] Sleepiness Scale and [place] Sleep Quality Index following treatment, as a measure of improvement in sleep-related quality of life.          Outpatient Education:  Adult Outpatient Education  Individual(s) Educated: Patient  Verbal Education : Purpose of RMST, Plan of care (patient has met goals of care; welcome to schedule a follow up appointment if there is a change or concern relating to sleep apnea/swallowing)  Patient/Caregiver Demonstrated Understanding: yes  Plan of Care Discussed and Agreed Upon: yes  Patient Response to Education: Patient/Caregiver  Verbalized Understanding of Information, Patient/Caregiver Asked Appropriate Questions, Patient/Caregiver Performed Return Demonstration of Exercises/Activities

## 2023-12-04 ENCOUNTER — TELEPHONE (OUTPATIENT)
Dept: CARDIOLOGY | Facility: CLINIC | Age: 68
End: 2023-12-04
Payer: COMMERCIAL

## 2023-12-04 NOTE — TELEPHONE ENCOUNTER
Maryjo states during water exercise today had some chest pressure and became nauseous today, got out of pool, took a drink of water and a nitroglycerin.  This helped.  She usually gets twinges but nothing like this.  She is concerned.  Has an appointment with you 12/14.  Should she not exercise until then or take it one session at a time?

## 2023-12-05 ENCOUNTER — TELEPHONE (OUTPATIENT)
Dept: PRIMARY CARE | Facility: CLINIC | Age: 68
End: 2023-12-05
Payer: COMMERCIAL

## 2023-12-05 DIAGNOSIS — E11.9 TYPE 2 DIABETES MELLITUS WITHOUT COMPLICATION, WITHOUT LONG-TERM CURRENT USE OF INSULIN (MULTI): ICD-10-CM

## 2023-12-05 RX ORDER — DULAGLUTIDE 0.75 MG/.5ML
0.75 INJECTION, SOLUTION SUBCUTANEOUS
Qty: 2 ML | Refills: 11 | Status: SHIPPED | OUTPATIENT
Start: 2023-12-05 | End: 2024-01-11 | Stop reason: ALTCHOICE

## 2023-12-13 PROBLEM — E11.9 CONTROLLED TYPE 2 DIABETES MELLITUS WITHOUT COMPLICATION, WITHOUT LONG-TERM CURRENT USE OF INSULIN (MULTI): Status: ACTIVE | Noted: 2019-01-03

## 2023-12-13 PROBLEM — N89.8 ITCHING IN THE VAGINAL AREA: Status: ACTIVE | Noted: 2020-03-05

## 2023-12-13 PROBLEM — I25.5 ISCHEMIC CONGESTIVE CARDIOMYOPATHY (MULTI): Status: ACTIVE | Noted: 2023-12-13

## 2023-12-13 PROBLEM — R53.1 DECREASED STRENGTH: Status: ACTIVE | Noted: 2023-12-13

## 2023-12-13 PROBLEM — K21.9 CHRONIC GERD: Status: ACTIVE | Noted: 2023-12-13

## 2023-12-13 PROBLEM — G47.20 DISTURBED SLEEP RHYTHM: Status: ACTIVE | Noted: 2023-12-13

## 2023-12-13 PROBLEM — R29.3 POOR POSTURE: Status: ACTIVE | Noted: 2023-12-13

## 2023-12-13 PROBLEM — I42.0 ISCHEMIC CONGESTIVE CARDIOMYOPATHY (MULTI): Status: ACTIVE | Noted: 2023-12-13

## 2023-12-13 PROBLEM — H25.13 AGE-RELATED NUCLEAR CATARACT OF BOTH EYES: Status: ACTIVE | Noted: 2019-02-06

## 2023-12-13 PROBLEM — M25.619 DECREASED RANGE OF MOTION (ROM) OF SHOULDER: Status: ACTIVE | Noted: 2023-12-13

## 2023-12-13 PROBLEM — E11.3299 BACKGROUND DIABETIC RETINOPATHY (MULTI): Status: ACTIVE | Noted: 2019-02-06

## 2023-12-13 PROBLEM — I10 BENIGN ESSENTIAL HYPERTENSION: Status: ACTIVE | Noted: 2023-12-13

## 2023-12-14 ENCOUNTER — OFFICE VISIT (OUTPATIENT)
Dept: CARDIOLOGY | Facility: CLINIC | Age: 68
End: 2023-12-14
Payer: COMMERCIAL

## 2023-12-14 VITALS
HEIGHT: 66 IN | BODY MASS INDEX: 27.48 KG/M2 | HEART RATE: 76 BPM | DIASTOLIC BLOOD PRESSURE: 62 MMHG | SYSTOLIC BLOOD PRESSURE: 116 MMHG | WEIGHT: 171 LBS | OXYGEN SATURATION: 96 %

## 2023-12-14 VITALS
DIASTOLIC BLOOD PRESSURE: 68 MMHG | SYSTOLIC BLOOD PRESSURE: 116 MMHG | WEIGHT: 174 LBS | OXYGEN SATURATION: 96 % | HEART RATE: 102 BPM | BODY MASS INDEX: 27.97 KG/M2 | HEIGHT: 66 IN

## 2023-12-14 DIAGNOSIS — E78.5 DYSLIPIDEMIA: ICD-10-CM

## 2023-12-14 DIAGNOSIS — Z95.810 BIVENTRICULAR IMPLANTABLE CARDIOVERTER-DEFIBRILLATOR (ICD) IN SITU: ICD-10-CM

## 2023-12-14 DIAGNOSIS — I25.10 CORONARY ARTERY DISEASE INVOLVING NATIVE CORONARY ARTERY OF NATIVE HEART WITHOUT ANGINA PECTORIS: Primary | ICD-10-CM

## 2023-12-14 DIAGNOSIS — I47.29 PAROXYSMAL VENTRICULAR TACHYCARDIA (MULTI): ICD-10-CM

## 2023-12-14 DIAGNOSIS — I10 PRIMARY HYPERTENSION: ICD-10-CM

## 2023-12-14 DIAGNOSIS — Z95.810 BIVENTRICULAR IMPLANTABLE CARDIOVERTER-DEFIBRILLATOR (ICD) IN SITU: Primary | ICD-10-CM

## 2023-12-14 PROCEDURE — 1160F RVW MEDS BY RX/DR IN RCRD: CPT | Performed by: INTERNAL MEDICINE

## 2023-12-14 PROCEDURE — 1036F TOBACCO NON-USER: CPT | Performed by: INTERNAL MEDICINE

## 2023-12-14 PROCEDURE — 1126F AMNT PAIN NOTED NONE PRSNT: CPT | Performed by: INTERNAL MEDICINE

## 2023-12-14 PROCEDURE — 3074F SYST BP LT 130 MM HG: CPT | Performed by: STUDENT IN AN ORGANIZED HEALTH CARE EDUCATION/TRAINING PROGRAM

## 2023-12-14 PROCEDURE — 1036F TOBACCO NON-USER: CPT | Performed by: STUDENT IN AN ORGANIZED HEALTH CARE EDUCATION/TRAINING PROGRAM

## 2023-12-14 PROCEDURE — 93000 ELECTROCARDIOGRAM COMPLETE: CPT | Performed by: STUDENT IN AN ORGANIZED HEALTH CARE EDUCATION/TRAINING PROGRAM

## 2023-12-14 PROCEDURE — 3051F HG A1C>EQUAL 7.0%<8.0%: CPT | Performed by: INTERNAL MEDICINE

## 2023-12-14 PROCEDURE — 3078F DIAST BP <80 MM HG: CPT | Performed by: INTERNAL MEDICINE

## 2023-12-14 PROCEDURE — 1159F MED LIST DOCD IN RCRD: CPT | Performed by: STUDENT IN AN ORGANIZED HEALTH CARE EDUCATION/TRAINING PROGRAM

## 2023-12-14 PROCEDURE — 1159F MED LIST DOCD IN RCRD: CPT | Performed by: INTERNAL MEDICINE

## 2023-12-14 PROCEDURE — 1126F AMNT PAIN NOTED NONE PRSNT: CPT | Performed by: STUDENT IN AN ORGANIZED HEALTH CARE EDUCATION/TRAINING PROGRAM

## 2023-12-14 PROCEDURE — 99214 OFFICE O/P EST MOD 30 MIN: CPT | Performed by: INTERNAL MEDICINE

## 2023-12-14 PROCEDURE — 3078F DIAST BP <80 MM HG: CPT | Performed by: STUDENT IN AN ORGANIZED HEALTH CARE EDUCATION/TRAINING PROGRAM

## 2023-12-14 PROCEDURE — 99213 OFFICE O/P EST LOW 20 MIN: CPT | Performed by: STUDENT IN AN ORGANIZED HEALTH CARE EDUCATION/TRAINING PROGRAM

## 2023-12-14 PROCEDURE — 1160F RVW MEDS BY RX/DR IN RCRD: CPT | Performed by: STUDENT IN AN ORGANIZED HEALTH CARE EDUCATION/TRAINING PROGRAM

## 2023-12-14 PROCEDURE — 3051F HG A1C>EQUAL 7.0%<8.0%: CPT | Performed by: STUDENT IN AN ORGANIZED HEALTH CARE EDUCATION/TRAINING PROGRAM

## 2023-12-14 PROCEDURE — 3074F SYST BP LT 130 MM HG: CPT | Performed by: INTERNAL MEDICINE

## 2023-12-14 ASSESSMENT — ENCOUNTER SYMPTOMS
OCCASIONAL FEELINGS OF UNSTEADINESS: 1
DEPRESSION: 0
LOSS OF SENSATION IN FEET: 1

## 2023-12-14 ASSESSMENT — PAIN SCALES - GENERAL: PAINLEVEL: 0-NO PAIN
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
## 2023-12-14 NOTE — PROGRESS NOTES
"Cardiac Electrophysiology Office Visit     Referred by Clement Fisher MD for   Chief Complaint   Patient presents with    Follow-up     HPI:  Antonia Rodriguez is a 68 y.o. year old female patient with h/o  h/o ICM s/p CRTD, HTN, DM, HLD, CAD s/p PCI (Nov 2021) presenting today for follow up    Pt reports doing well, denies any episode of chest pain, shortness of breath palpitations or dizziness.    Objective  Allergies   Allergen Reactions    Iodinated Contrast Media Unknown    Other Other    Penicillins Other        Current Outpatient Medications   Medication Instructions    ascorbic acid, vitamin C, 500 mg capsule oral    aspirin 81 mg, oral, Daily    atorvastatin (Lipitor) 80 mg tablet 1 tablet, oral, Daily    busPIRone (BUSPAR) 15 mg, oral, 2 times daily PRN    cyanocobalamin (Vitamin B-12) 100 mcg tablet oral    dapagliflozin propanediol (FARXIGA) 10 mg, oral, Daily    ergocalciferol (Vitamin D-2) 1.25 MG (14669 UT) capsule oral    fluticasone (Flonase) 50 mcg/actuation nasal spray 1 spray, nasal, Daily    furosemide (LASIX) 40 mg, oral, Daily    isosorbide mononitrate ER (Imdur) 60 mg 24 hr tablet 1 tablet, oral, Daily    nitroglycerin (NitrolinguaL) 400 mcg/spray spray 1 spray, sublingual, As needed    sacubitriL-valsartan (Entresto) 24-26 mg tablet 1 tablet, oral, 2 times daily    sotalol (BETAPACE) 120 mg, oral, 2 times daily    spironolactone (ALDACTONE) 25 mg, oral, Daily    Trulicity 1.5 mg, subcutaneous, Weekly    Trulicity 0.75 mg, subcutaneous, Weekly         Visit Vitals  /68 (BP Location: Right arm, Patient Position: Sitting)   Pulse 102   Ht 1.676 m (5' 6\")   Wt 78.9 kg (174 lb)   SpO2 96%   BMI 28.08 kg/m²   Smoking Status Former   BSA 1.92 m²      Physical Exam  Constitutional:       Appearance: Normal appearance.   HENT:      Head: Normocephalic.   Eyes:      Pupils: Pupils are equal, round, and reactive to light.   Cardiovascular:      Rate and Rhythm: Normal rate and regular " rhythm.      Pulses: Normal pulses.      Comments: left sided implant healed well, no ecchymosis, hematoma or drainage noted  Pulmonary:      Effort: Pulmonary effort is normal. No respiratory distress.      Breath sounds: Normal breath sounds.   Skin:     General: Skin is warm and dry.      Capillary Refill: Capillary refill takes less than 2 seconds.   Neurological:      Mental Status: She is alert.           My Interpretation of Reviewed Study(s):  Echo (March 2023): Normal left ventricular systolic function with an EF of 55 to 60%. Normal biatrial sizes. No significant valvular dysfunction. No pericardial effusion noted.    Assessment/Plan   #ICM s/p CRT-D (MDT July 2022)  #h/o VT  #Sotalol monitoring  Patient has a Medtronic Biventricular ICD.  Anticipated battery longevity 9.6yrs.    ---RA pacing 89.8%, RV pacing 96.5%. BiV Pacing 95.3  ---Arrhythmias noted on interrogation: 2 NSVT episodes noted in Nov 4th and Nov 5th  ---Lead parameters stable with steady impedance and thresholds noted: Stable  - c/t to follow with device clinic as scheduled  - ICD reviewed no VT therapies  - Device reprogrammed in March to allow detection and treatment of slower VT  - Interrogated device shows no therapies  - c/w Sotalol 120mg BID - Qtc slightly more pronlonged compared to last ECG however pt has wider paced QRS today     Return to Clinic: Patient should return to the EP Clinic in 1 year    Clement Perze MD [facility]  Cardiac Electrophysiology  [email or web address]    **Disclaimer: This note was dictated by speech recognition, and every effort has been made to prevent any error in transcription, however minor errors may be present**

## 2023-12-18 ENCOUNTER — APPOINTMENT (OUTPATIENT)
Dept: RADIOLOGY | Facility: HOSPITAL | Age: 68
End: 2023-12-18
Payer: COMMERCIAL

## 2023-12-18 ENCOUNTER — APPOINTMENT (OUTPATIENT)
Dept: CARDIOLOGY | Facility: HOSPITAL | Age: 68
End: 2023-12-18
Payer: COMMERCIAL

## 2023-12-18 ENCOUNTER — HOSPITAL ENCOUNTER (OUTPATIENT)
Facility: HOSPITAL | Age: 68
Setting detail: OBSERVATION
Discharge: HOME | End: 2023-12-18
Attending: EMERGENCY MEDICINE | Admitting: INTERNAL MEDICINE
Payer: COMMERCIAL

## 2023-12-18 VITALS
RESPIRATION RATE: 18 BRPM | WEIGHT: 174.38 LBS | TEMPERATURE: 96.8 F | DIASTOLIC BLOOD PRESSURE: 57 MMHG | BODY MASS INDEX: 28.03 KG/M2 | SYSTOLIC BLOOD PRESSURE: 101 MMHG | OXYGEN SATURATION: 97 % | HEIGHT: 66 IN | HEART RATE: 72 BPM

## 2023-12-18 DIAGNOSIS — R07.9 CHEST PAIN, UNSPECIFIED TYPE: ICD-10-CM

## 2023-12-18 DIAGNOSIS — I20.0 UNSTABLE ANGINA (MULTI): Primary | ICD-10-CM

## 2023-12-18 LAB
ALBUMIN SERPL BCP-MCNC: 4.6 G/DL (ref 3.4–5)
ALP SERPL-CCNC: 80 U/L (ref 33–136)
ALT SERPL W P-5'-P-CCNC: 25 U/L (ref 7–45)
ANION GAP SERPL CALC-SCNC: 16 MMOL/L (ref 10–20)
APTT PPP: 32 SECONDS (ref 27–38)
AST SERPL W P-5'-P-CCNC: 33 U/L (ref 9–39)
ATRIAL RATE: 78 BPM
ATRIAL RATE: 83 BPM
BASOPHILS # BLD AUTO: 0.02 X10*3/UL (ref 0–0.1)
BASOPHILS NFR BLD AUTO: 0.3 %
BILIRUB SERPL-MCNC: 0.6 MG/DL (ref 0–1.2)
BUN SERPL-MCNC: 23 MG/DL (ref 6–23)
CALCIUM SERPL-MCNC: 9 MG/DL (ref 8.6–10.3)
CARDIAC TROPONIN I PNL SERPL HS: 11 NG/L (ref 0–13)
CARDIAC TROPONIN I PNL SERPL HS: 12 NG/L (ref 0–13)
CHLORIDE SERPL-SCNC: 103 MMOL/L (ref 98–107)
CO2 SERPL-SCNC: 23 MMOL/L (ref 21–32)
CREAT SERPL-MCNC: 1.15 MG/DL (ref 0.5–1.05)
EOSINOPHIL # BLD AUTO: 0.02 X10*3/UL (ref 0–0.7)
EOSINOPHIL NFR BLD AUTO: 0.3 %
ERYTHROCYTE [DISTWIDTH] IN BLOOD BY AUTOMATED COUNT: 14 % (ref 11.5–14.5)
GFR SERPL CREATININE-BSD FRML MDRD: 52 ML/MIN/1.73M*2
GLUCOSE SERPL-MCNC: 176 MG/DL (ref 74–99)
HCT VFR BLD AUTO: 47.6 % (ref 36–46)
HGB BLD-MCNC: 15.5 G/DL (ref 12–16)
HOLD SPECIMEN: NORMAL
IMM GRANULOCYTES # BLD AUTO: 0.01 X10*3/UL (ref 0–0.7)
IMM GRANULOCYTES NFR BLD AUTO: 0.2 % (ref 0–0.9)
INR PPP: 1.1 (ref 0.9–1.1)
INR PPP: 1.2 (ref 0.9–1.1)
LYMPHOCYTES # BLD AUTO: 0.98 X10*3/UL (ref 1.2–4.8)
LYMPHOCYTES NFR BLD AUTO: 16.5 %
MAGNESIUM SERPL-MCNC: 2.15 MG/DL (ref 1.6–2.4)
MCH RBC QN AUTO: 29.6 PG (ref 26–34)
MCHC RBC AUTO-ENTMCNC: 32.6 G/DL (ref 32–36)
MCV RBC AUTO: 91 FL (ref 80–100)
MONOCYTES # BLD AUTO: 0.84 X10*3/UL (ref 0.1–1)
MONOCYTES NFR BLD AUTO: 14.1 %
NEUTROPHILS # BLD AUTO: 4.08 X10*3/UL (ref 1.2–7.7)
NEUTROPHILS NFR BLD AUTO: 68.6 %
NRBC BLD-RTO: 0 /100 WBCS (ref 0–0)
P AXIS: 1 DEGREES
P OFFSET: 177 MS
P OFFSET: 208 MS
P ONSET: 125 MS
P ONSET: 166 MS
PLATELET # BLD AUTO: 251 X10*3/UL (ref 150–450)
POTASSIUM SERPL-SCNC: 3.9 MMOL/L (ref 3.5–5.3)
PR INTERVAL: 162 MS
PR INTERVAL: 88 MS
PROT SERPL-MCNC: 7.3 G/DL (ref 6.4–8.2)
PROTHROMBIN TIME: 12.9 SECONDS (ref 9.8–12.8)
PROTHROMBIN TIME: 13.4 SECONDS (ref 9.8–12.8)
Q ONSET: 206 MS
Q ONSET: 210 MS
QRS COUNT: 13 BEATS
QRS COUNT: 14 BEATS
QRS DURATION: 112 MS
QRS DURATION: 114 MS
QT INTERVAL: 404 MS
QT INTERVAL: 448 MS
QTC CALCULATION(BAZETT): 474 MS
QTC CALCULATION(BAZETT): 510 MS
QTC FREDERICIA: 450 MS
QTC FREDERICIA: 489 MS
R AXIS: -11 DEGREES
R AXIS: 40 DEGREES
RBC # BLD AUTO: 5.23 X10*6/UL (ref 4–5.2)
SODIUM SERPL-SCNC: 138 MMOL/L (ref 136–145)
T AXIS: -58 DEGREES
T AXIS: 92 DEGREES
T OFFSET: 412 MS
T OFFSET: 430 MS
VENTRICULAR RATE: 78 BPM
VENTRICULAR RATE: 83 BPM
WBC # BLD AUTO: 6 X10*3/UL (ref 4.4–11.3)

## 2023-12-18 PROCEDURE — 99239 HOSP IP/OBS DSCHRG MGMT >30: CPT | Performed by: INTERNAL MEDICINE

## 2023-12-18 PROCEDURE — 93572 IV DOP VEL&/PRESS C FLO EA: CPT | Performed by: INTERNAL MEDICINE

## 2023-12-18 PROCEDURE — 93571 IV DOP VEL&/PRESS C FLO 1ST: CPT | Performed by: INTERNAL MEDICINE

## 2023-12-18 PROCEDURE — 7100000009 HC PHASE TWO TIME - INITIAL BASE CHARGE: Performed by: INTERNAL MEDICINE

## 2023-12-18 PROCEDURE — 99152 MOD SED SAME PHYS/QHP 5/>YRS: CPT | Performed by: INTERNAL MEDICINE

## 2023-12-18 PROCEDURE — 99285 EMERGENCY DEPT VISIT HI MDM: CPT | Performed by: EMERGENCY MEDICINE

## 2023-12-18 PROCEDURE — 93010 ELECTROCARDIOGRAM REPORT: CPT | Performed by: INTERNAL MEDICINE

## 2023-12-18 PROCEDURE — 85025 COMPLETE CBC W/AUTO DIFF WBC: CPT | Performed by: STUDENT IN AN ORGANIZED HEALTH CARE EDUCATION/TRAINING PROGRAM

## 2023-12-18 PROCEDURE — 93005 ELECTROCARDIOGRAM TRACING: CPT | Mod: MUE

## 2023-12-18 PROCEDURE — 93458 L HRT ARTERY/VENTRICLE ANGIO: CPT | Performed by: INTERNAL MEDICINE

## 2023-12-18 PROCEDURE — G0378 HOSPITAL OBSERVATION PER HR: HCPCS

## 2023-12-18 PROCEDURE — 96374 THER/PROPH/DIAG INJ IV PUSH: CPT | Mod: 59 | Performed by: INTERNAL MEDICINE

## 2023-12-18 PROCEDURE — C1769 GUIDE WIRE: HCPCS | Performed by: INTERNAL MEDICINE

## 2023-12-18 PROCEDURE — 71045 X-RAY EXAM CHEST 1 VIEW: CPT | Performed by: STUDENT IN AN ORGANIZED HEALTH CARE EDUCATION/TRAINING PROGRAM

## 2023-12-18 PROCEDURE — 2500000001 HC RX 250 WO HCPCS SELF ADMINISTERED DRUGS (ALT 637 FOR MEDICARE OP): Performed by: STUDENT IN AN ORGANIZED HEALTH CARE EDUCATION/TRAINING PROGRAM

## 2023-12-18 PROCEDURE — 2500000004 HC RX 250 GENERAL PHARMACY W/ HCPCS (ALT 636 FOR OP/ED): Performed by: INTERNAL MEDICINE

## 2023-12-18 PROCEDURE — 80053 COMPREHEN METABOLIC PANEL: CPT | Performed by: STUDENT IN AN ORGANIZED HEALTH CARE EDUCATION/TRAINING PROGRAM

## 2023-12-18 PROCEDURE — 85610 PROTHROMBIN TIME: CPT | Performed by: STUDENT IN AN ORGANIZED HEALTH CARE EDUCATION/TRAINING PROGRAM

## 2023-12-18 PROCEDURE — C1887 CATHETER, GUIDING: HCPCS | Performed by: INTERNAL MEDICINE

## 2023-12-18 PROCEDURE — C1894 INTRO/SHEATH, NON-LASER: HCPCS | Performed by: INTERNAL MEDICINE

## 2023-12-18 PROCEDURE — 84484 ASSAY OF TROPONIN QUANT: CPT | Performed by: STUDENT IN AN ORGANIZED HEALTH CARE EDUCATION/TRAINING PROGRAM

## 2023-12-18 PROCEDURE — 2500000004 HC RX 250 GENERAL PHARMACY W/ HCPCS (ALT 636 FOR OP/ED): Performed by: STUDENT IN AN ORGANIZED HEALTH CARE EDUCATION/TRAINING PROGRAM

## 2023-12-18 PROCEDURE — 85730 THROMBOPLASTIN TIME PARTIAL: CPT | Performed by: STUDENT IN AN ORGANIZED HEALTH CARE EDUCATION/TRAINING PROGRAM

## 2023-12-18 PROCEDURE — 36415 COLL VENOUS BLD VENIPUNCTURE: CPT | Performed by: STUDENT IN AN ORGANIZED HEALTH CARE EDUCATION/TRAINING PROGRAM

## 2023-12-18 PROCEDURE — 83735 ASSAY OF MAGNESIUM: CPT | Performed by: STUDENT IN AN ORGANIZED HEALTH CARE EDUCATION/TRAINING PROGRAM

## 2023-12-18 PROCEDURE — 93005 ELECTROCARDIOGRAM TRACING: CPT

## 2023-12-18 PROCEDURE — 71045 X-RAY EXAM CHEST 1 VIEW: CPT

## 2023-12-18 PROCEDURE — 99223 1ST HOSP IP/OBS HIGH 75: CPT | Performed by: INTERNAL MEDICINE

## 2023-12-18 PROCEDURE — 7100000010 HC PHASE TWO TIME - EACH INCREMENTAL 1 MINUTE: Performed by: INTERNAL MEDICINE

## 2023-12-18 PROCEDURE — 2720000007 HC OR 272 NO HCPCS: Performed by: INTERNAL MEDICINE

## 2023-12-18 PROCEDURE — 2550000001 HC RX 255 CONTRASTS: Performed by: INTERNAL MEDICINE

## 2023-12-18 PROCEDURE — 2500000005 HC RX 250 GENERAL PHARMACY W/O HCPCS: Performed by: INTERNAL MEDICINE

## 2023-12-18 PROCEDURE — 96373 THER/PROPH/DIAG INJ IA: CPT | Mod: 59 | Performed by: INTERNAL MEDICINE

## 2023-12-18 RX ORDER — NITROGLYCERIN 0.4 MG/1
0.4 TABLET SUBLINGUAL EVERY 5 MIN PRN
Status: DISCONTINUED | OUTPATIENT
Start: 2023-12-18 | End: 2023-12-18 | Stop reason: HOSPADM

## 2023-12-18 RX ORDER — ASCORBIC ACID 500 MG
500 TABLET ORAL DAILY
COMMUNITY

## 2023-12-18 RX ORDER — FENTANYL CITRATE 50 UG/ML
INJECTION, SOLUTION INTRAMUSCULAR; INTRAVENOUS AS NEEDED
Status: DISCONTINUED | OUTPATIENT
Start: 2023-12-18 | End: 2023-12-18 | Stop reason: HOSPADM

## 2023-12-18 RX ORDER — ACETAMINOPHEN 325 MG/1
650 TABLET ORAL EVERY 4 HOURS PRN
Status: DISCONTINUED | OUTPATIENT
Start: 2023-12-18 | End: 2023-12-18 | Stop reason: HOSPADM

## 2023-12-18 RX ORDER — HEPARIN SODIUM 1000 [USP'U]/ML
INJECTION, SOLUTION INTRAVENOUS; SUBCUTANEOUS AS NEEDED
Status: DISCONTINUED | OUTPATIENT
Start: 2023-12-18 | End: 2023-12-18 | Stop reason: HOSPADM

## 2023-12-18 RX ORDER — MIDAZOLAM HYDROCHLORIDE 1 MG/ML
INJECTION INTRAMUSCULAR; INTRAVENOUS AS NEEDED
Status: DISCONTINUED | OUTPATIENT
Start: 2023-12-18 | End: 2023-12-18 | Stop reason: HOSPADM

## 2023-12-18 RX ORDER — ATORVASTATIN CALCIUM 80 MG/1
80 TABLET, FILM COATED ORAL NIGHTLY
Status: DISCONTINUED | OUTPATIENT
Start: 2023-12-18 | End: 2023-12-18 | Stop reason: HOSPADM

## 2023-12-18 RX ORDER — DIPHENHYDRAMINE HYDROCHLORIDE 50 MG/ML
INJECTION INTRAMUSCULAR; INTRAVENOUS AS NEEDED
Status: DISCONTINUED | OUTPATIENT
Start: 2023-12-18 | End: 2023-12-18 | Stop reason: HOSPADM

## 2023-12-18 RX ORDER — NAPROXEN SODIUM 220 MG/1
81 TABLET, FILM COATED ORAL DAILY
Status: DISCONTINUED | OUTPATIENT
Start: 2023-12-19 | End: 2023-12-18 | Stop reason: HOSPADM

## 2023-12-18 RX ORDER — ACETAMINOPHEN 650 MG/1
650 SUPPOSITORY RECTAL EVERY 4 HOURS PRN
Status: DISCONTINUED | OUTPATIENT
Start: 2023-12-18 | End: 2023-12-18 | Stop reason: HOSPADM

## 2023-12-18 RX ORDER — PANTOPRAZOLE SODIUM 40 MG/1
40 TABLET, DELAYED RELEASE ORAL DAILY PRN
COMMUNITY

## 2023-12-18 RX ORDER — ONDANSETRON HYDROCHLORIDE 2 MG/ML
4 INJECTION, SOLUTION INTRAVENOUS ONCE
Status: COMPLETED | OUTPATIENT
Start: 2023-12-18 | End: 2023-12-18

## 2023-12-18 RX ORDER — ACETAMINOPHEN 160 MG/5ML
650 SOLUTION ORAL EVERY 4 HOURS PRN
Status: DISCONTINUED | OUTPATIENT
Start: 2023-12-18 | End: 2023-12-18 | Stop reason: HOSPADM

## 2023-12-18 RX ORDER — NITROGLYCERIN 0.4 MG/1
0.4 TABLET SUBLINGUAL EVERY 5 MIN PRN
Status: DISCONTINUED | OUTPATIENT
Start: 2023-12-18 | End: 2023-12-18

## 2023-12-18 RX ORDER — NITROGLYCERIN 0.4 MG/1
0.4 TABLET SUBLINGUAL EVERY 5 MIN PRN
COMMUNITY
End: 2024-01-23

## 2023-12-18 RX ORDER — NITROGLYCERIN 5 MG/ML
INJECTION, SOLUTION INTRAVENOUS AS NEEDED
Status: DISCONTINUED | OUTPATIENT
Start: 2023-12-18 | End: 2023-12-18 | Stop reason: HOSPADM

## 2023-12-18 RX ORDER — ENOXAPARIN SODIUM 100 MG/ML
40 INJECTION SUBCUTANEOUS EVERY 24 HOURS
Status: DISCONTINUED | OUTPATIENT
Start: 2023-12-18 | End: 2023-12-18 | Stop reason: HOSPADM

## 2023-12-18 RX ORDER — LIDOCAINE HYDROCHLORIDE 20 MG/ML
INJECTION, SOLUTION INFILTRATION; PERINEURAL AS NEEDED
Status: DISCONTINUED | OUTPATIENT
Start: 2023-12-18 | End: 2023-12-18 | Stop reason: HOSPADM

## 2023-12-18 RX ADMIN — NITROGLYCERIN 0.4 MG: 0.4 TABLET SUBLINGUAL at 09:00

## 2023-12-18 RX ADMIN — ONDANSETRON 4 MG: 2 INJECTION INTRAMUSCULAR; INTRAVENOUS at 06:04

## 2023-12-18 SDOH — SOCIAL STABILITY: SOCIAL NETWORK: HOW OFTEN DO YOU ATTENT MEETINGS OF THE CLUB OR ORGANIZATION YOU BELONG TO?: NEVER

## 2023-12-18 SDOH — HEALTH STABILITY: MENTAL HEALTH
STRESS IS WHEN SOMEONE FEELS TENSE, NERVOUS, ANXIOUS, OR CAN'T SLEEP AT NIGHT BECAUSE THEIR MIND IS TROUBLED. HOW STRESSED ARE YOU?: NOT AT ALL

## 2023-12-18 SDOH — SOCIAL STABILITY: SOCIAL INSECURITY
WITHIN THE LAST YEAR, HAVE YOU BEEN KICKED, HIT, SLAPPED, OR OTHERWISE PHYSICALLY HURT BY YOUR PARTNER OR EX-PARTNER?: NO

## 2023-12-18 SDOH — SOCIAL STABILITY: SOCIAL INSECURITY: DOES ANYONE TRY TO KEEP YOU FROM HAVING/CONTACTING OTHER FRIENDS OR DOING THINGS OUTSIDE YOUR HOME?: NO

## 2023-12-18 SDOH — HEALTH STABILITY: PHYSICAL HEALTH: ON AVERAGE, HOW MANY DAYS PER WEEK DO YOU ENGAGE IN MODERATE TO STRENUOUS EXERCISE (LIKE A BRISK WALK)?: 5 DAYS

## 2023-12-18 SDOH — SOCIAL STABILITY: SOCIAL INSECURITY: WERE YOU ABLE TO COMPLETE ALL THE BEHAVIORAL HEALTH SCREENINGS?: YES

## 2023-12-18 SDOH — SOCIAL STABILITY: SOCIAL NETWORK
IN A TYPICAL WEEK, HOW MANY TIMES DO YOU TALK ON THE PHONE WITH FAMILY, FRIENDS, OR NEIGHBORS?: MORE THAN THREE TIMES A WEEK

## 2023-12-18 SDOH — ECONOMIC STABILITY: INCOME INSECURITY: HOW HARD IS IT FOR YOU TO PAY FOR THE VERY BASICS LIKE FOOD, HOUSING, MEDICAL CARE, AND HEATING?: NOT HARD AT ALL

## 2023-12-18 SDOH — SOCIAL STABILITY: SOCIAL INSECURITY
WITHIN THE LAST YEAR, HAVE TO BEEN RAPED OR FORCED TO HAVE ANY KIND OF SEXUAL ACTIVITY BY YOUR PARTNER OR EX-PARTNER?: NO

## 2023-12-18 SDOH — SOCIAL STABILITY: SOCIAL NETWORK: ARE YOU MARRIED, WIDOWED, DIVORCED, SEPARATED, NEVER MARRIED, OR LIVING WITH A PARTNER?: DIVORCED

## 2023-12-18 SDOH — SOCIAL STABILITY: SOCIAL NETWORK
DO YOU BELONG TO ANY CLUBS OR ORGANIZATIONS SUCH AS CHURCH GROUPS UNIONS, FRATERNAL OR ATHLETIC GROUPS, OR SCHOOL GROUPS?: NO

## 2023-12-18 SDOH — ECONOMIC STABILITY: INCOME INSECURITY: IN THE LAST 12 MONTHS, WAS THERE A TIME WHEN YOU WERE NOT ABLE TO PAY THE MORTGAGE OR RENT ON TIME?: NO

## 2023-12-18 SDOH — HEALTH STABILITY: PHYSICAL HEALTH: ON AVERAGE, HOW MANY MINUTES DO YOU ENGAGE IN EXERCISE AT THIS LEVEL?: 90 MIN

## 2023-12-18 SDOH — SOCIAL STABILITY: SOCIAL INSECURITY: WITHIN THE LAST YEAR, HAVE YOU BEEN HUMILIATED OR EMOTIONALLY ABUSED IN OTHER WAYS BY YOUR PARTNER OR EX-PARTNER?: NO

## 2023-12-18 SDOH — SOCIAL STABILITY: SOCIAL INSECURITY: ARE YOU OR HAVE YOU BEEN THREATENED OR ABUSED PHYSICALLY, EMOTIONALLY, OR SEXUALLY BY ANYONE?: NO

## 2023-12-18 SDOH — SOCIAL STABILITY: SOCIAL INSECURITY: ARE THERE ANY APPARENT SIGNS OF INJURIES/BEHAVIORS THAT COULD BE RELATED TO ABUSE/NEGLECT?: NO

## 2023-12-18 SDOH — ECONOMIC STABILITY: FOOD INSECURITY: WITHIN THE PAST 12 MONTHS, THE FOOD YOU BOUGHT JUST DIDN'T LAST AND YOU DIDN'T HAVE MONEY TO GET MORE.: NEVER TRUE

## 2023-12-18 SDOH — SOCIAL STABILITY: SOCIAL INSECURITY: DO YOU FEEL ANYONE HAS EXPLOITED OR TAKEN ADVANTAGE OF YOU FINANCIALLY OR OF YOUR PERSONAL PROPERTY?: NO

## 2023-12-18 SDOH — SOCIAL STABILITY: SOCIAL INSECURITY: HAS ANYONE EVER THREATENED TO HURT YOUR FAMILY OR YOUR PETS?: NO

## 2023-12-18 SDOH — SOCIAL STABILITY: SOCIAL NETWORK: HOW OFTEN DO YOU GET TOGETHER WITH FRIENDS OR RELATIVES?: THREE TIMES A WEEK

## 2023-12-18 SDOH — ECONOMIC STABILITY: FOOD INSECURITY: WITHIN THE PAST 12 MONTHS, YOU WORRIED THAT YOUR FOOD WOULD RUN OUT BEFORE YOU GOT MONEY TO BUY MORE.: NEVER TRUE

## 2023-12-18 SDOH — SOCIAL STABILITY: SOCIAL INSECURITY: WITHIN THE LAST YEAR, HAVE YOU BEEN AFRAID OF YOUR PARTNER OR EX-PARTNER?: NO

## 2023-12-18 SDOH — ECONOMIC STABILITY: HOUSING INSECURITY: IN THE LAST 12 MONTHS, HOW MANY PLACES HAVE YOU LIVED?: 1

## 2023-12-18 SDOH — SOCIAL STABILITY: SOCIAL INSECURITY: HAVE YOU HAD THOUGHTS OF HARMING ANYONE ELSE?: NO

## 2023-12-18 SDOH — SOCIAL STABILITY: SOCIAL INSECURITY: DO YOU FEEL UNSAFE GOING BACK TO THE PLACE WHERE YOU ARE LIVING?: NO

## 2023-12-18 SDOH — SOCIAL STABILITY: SOCIAL NETWORK: HOW OFTEN DO YOU ATTEND CHURCH OR RELIGIOUS SERVICES?: MORE THAN 4 TIMES PER YEAR

## 2023-12-18 SDOH — SOCIAL STABILITY: SOCIAL INSECURITY: ABUSE: ADULT

## 2023-12-18 ASSESSMENT — ENCOUNTER SYMPTOMS
DIZZINESS: 0
WOUND: 0
EYE REDNESS: 0
POLYDIPSIA: 0
AGITATION: 0
FEVER: 0
ADENOPATHY: 0
ACTIVITY CHANGE: 1
ABDOMINAL PAIN: 0
PALPITATIONS: 0
NERVOUS/ANXIOUS: 0
HEADACHES: 0
VOMITING: 0
FREQUENCY: 0
SINUS PRESSURE: 0
APPETITE CHANGE: 0
CHEST TIGHTNESS: 0
TROUBLE SWALLOWING: 0
WEAKNESS: 0
BACK PAIN: 0
ARTHRALGIAS: 0
MYALGIAS: 0
DYSURIA: 0
DIAPHORESIS: 0
COUGH: 0
NUMBNESS: 0
CHILLS: 0
ABDOMINAL DISTENTION: 0
FLANK PAIN: 0
SHORTNESS OF BREATH: 0
BRUISES/BLEEDS EASILY: 0
LIGHT-HEADEDNESS: 0
PHOTOPHOBIA: 0
FATIGUE: 0
JOINT SWELLING: 0
NAUSEA: 1
FACIAL SWELLING: 0
DIFFICULTY URINATING: 0
WHEEZING: 0
COLOR CHANGE: 0
EYE PAIN: 0

## 2023-12-18 ASSESSMENT — LIFESTYLE VARIABLES
EVER FELT BAD OR GUILTY ABOUT YOUR DRINKING: NO
HOW OFTEN DO YOU HAVE 6 OR MORE DRINKS ON ONE OCCASION: NEVER
EVER HAD A DRINK FIRST THING IN THE MORNING TO STEADY YOUR NERVES TO GET RID OF A HANGOVER: NO
AUDIT-C TOTAL SCORE: 0
HAVE YOU EVER FELT YOU SHOULD CUT DOWN ON YOUR DRINKING: NO
HOW MANY STANDARD DRINKS CONTAINING ALCOHOL DO YOU HAVE ON A TYPICAL DAY: PATIENT DOES NOT DRINK
AUDIT-C TOTAL SCORE: 0
HAVE PEOPLE ANNOYED YOU BY CRITICIZING YOUR DRINKING: NO
REASON UNABLE TO ASSESS: NO
SKIP TO QUESTIONS 9-10: 1
HOW OFTEN DO YOU HAVE A DRINK CONTAINING ALCOHOL: NEVER

## 2023-12-18 ASSESSMENT — ACTIVITIES OF DAILY LIVING (ADL)
HEARING - RIGHT EAR: FUNCTIONAL
BATHING: INDEPENDENT
HEARING - LEFT EAR: FUNCTIONAL
WALKS IN HOME: INDEPENDENT
FEEDING YOURSELF: INDEPENDENT
ADEQUATE_TO_COMPLETE_ADL: YES
LACK_OF_TRANSPORTATION: NO
DRESSING YOURSELF: INDEPENDENT
TOILETING: INDEPENDENT
PATIENT'S MEMORY ADEQUATE TO SAFELY COMPLETE DAILY ACTIVITIES?: YES
GROOMING: INDEPENDENT
JUDGMENT_ADEQUATE_SAFELY_COMPLETE_DAILY_ACTIVITIES: YES

## 2023-12-18 ASSESSMENT — COGNITIVE AND FUNCTIONAL STATUS - GENERAL
DAILY ACTIVITIY SCORE: 24
PATIENT BASELINE BEDBOUND: NO
MOBILITY SCORE: 23
CLIMB 3 TO 5 STEPS WITH RAILING: A LITTLE

## 2023-12-18 ASSESSMENT — PAIN DESCRIPTION - LOCATION: LOCATION: CHEST

## 2023-12-18 ASSESSMENT — PAIN - FUNCTIONAL ASSESSMENT
PAIN_FUNCTIONAL_ASSESSMENT: 0-10

## 2023-12-18 ASSESSMENT — PAIN SCALES - GENERAL
PAINLEVEL_OUTOF10: 0 - NO PAIN
PAINLEVEL_OUTOF10: 5 - MODERATE PAIN
PAINLEVEL_OUTOF10: 4
PAINLEVEL_OUTOF10: 0 - NO PAIN
PAINLEVEL_OUTOF10: 4

## 2023-12-18 ASSESSMENT — PATIENT HEALTH QUESTIONNAIRE - PHQ9
2. FEELING DOWN, DEPRESSED OR HOPELESS: NOT AT ALL
SUM OF ALL RESPONSES TO PHQ9 QUESTIONS 1 & 2: 0
1. LITTLE INTEREST OR PLEASURE IN DOING THINGS: NOT AT ALL

## 2023-12-18 ASSESSMENT — PAIN DESCRIPTION - ORIENTATION: ORIENTATION: MID

## 2023-12-18 ASSESSMENT — PAIN DESCRIPTION - DESCRIPTORS
DESCRIPTORS: PRESSURE

## 2023-12-18 ASSESSMENT — PAIN DESCRIPTION - DIRECTION: RADIATING_TOWARDS: BACK

## 2023-12-18 ASSESSMENT — PAIN DESCRIPTION - PAIN TYPE: TYPE: ACUTE PAIN

## 2023-12-18 ASSESSMENT — PAIN DESCRIPTION - FREQUENCY: FREQUENCY: CONSTANT/CONTINUOUS

## 2023-12-18 ASSESSMENT — PAIN DESCRIPTION - ONSET: ONSET: AWAKENED FROM SLEEP

## 2023-12-18 ASSESSMENT — PAIN DESCRIPTION - PROGRESSION: CLINICAL_PROGRESSION: GRADUALLY IMPROVING

## 2023-12-18 NOTE — CARE PLAN
The patient's goals for the shift include      The clinical goals for the shift include monitor vitals, control chest pain    Problem: Pain - Adult  Goal: Verbalizes/displays adequate comfort level or baseline comfort level  Outcome: Progressing     Problem: Discharge Planning  Goal: Discharge to home or other facility with appropriate resources  Outcome: Progressing     Problem: Safety - Adult  Goal: Free from fall injury  Outcome: Progressing     Problem: Chronic Conditions and Co-morbidities  Goal: Patient's chronic conditions and co-morbidity symptoms are monitored and maintained or improved  Outcome: Progressing     Problem: Fall/Injury  Goal: Not fall by end of shift  Outcome: Progressing  Goal: Be free from injury by end of the shift  Outcome: Progressing  Goal: Verbalize understanding of personal risk factors for fall in the hospital  Outcome: Progressing  Goal: Verbalize understanding of risk factor reduction measures to prevent injury from fall in the home  Outcome: Progressing  Goal: Use assistive devices by end of the shift  Outcome: Progressing  Goal: Pace activities to prevent fatigue by end of the shift  Outcome: Progressing     Problem: Diabetes  Goal: Achieve decreasing blood glucose levels by end of shift  Outcome: Progressing  Goal: Increase stability of blood glucose readings by end of shift  Outcome: Progressing  Goal: Decrease in ketones present in urine by end of shift  Outcome: Progressing  Goal: Maintain electrolyte levels within acceptable range throughout shift  Outcome: Progressing  Goal: Maintain glucose levels >70mg/dl to <250mg/dl throughout shift  Outcome: Progressing  Goal: No changes in neurological exam by end of shift  Outcome: Progressing  Goal: Learn about and adhere to nutrition recommendations by end of shift  Outcome: Progressing  Goal: Vital signs within normal range for age by end of shift  Outcome: Progressing  Goal: Increase self care and/or family involovement by end of  shift  Outcome: Progressing  Goal: Receive DSME education by end of shift  Outcome: Progressing     Problem: Heart Failure  Goal: Improved gas exchange this shift  Outcome: Progressing  Goal: Improved urinary output this shift  Outcome: Progressing  Goal: Reduction in peripheral edema within 24 hours  Outcome: Progressing  Goal: Report improvement of dyspnea/breathlessness this shift  Outcome: Progressing  Goal: Weight from fluid excess reduced over 2-3 days, then stabilize  Outcome: Progressing  Goal: Increase self care and/or family involvement in 24 hours  Outcome: Progressing     Problem: Pain  Goal: Takes deep breaths with improved pain control throughout the shift  Outcome: Progressing  Goal: Turns in bed with improved pain control throughout the shift  Outcome: Progressing  Goal: Walks with improved pain control throughout the shift  Outcome: Progressing  Goal: Performs ADL's with improved pain control throughout shift  Outcome: Progressing  Goal: Participates in PT with improved pain control throughout the shift  Outcome: Progressing  Goal: Free from opioid side effects throughout the shift  Outcome: Progressing  Goal: Free from acute confusion related to pain meds throughout the shift  Outcome: Progressing

## 2023-12-18 NOTE — POST-PROCEDURE NOTE
Physician Transition of Care Summary  Invasive Cardiovascular Lab    Procedure Date: 12/18/2023  Attending:    * Juan Boyer - Primary  Resident/Fellow/Other Assistant: Surgeon(s) and Role:    Indications:   Pre-op Diagnosis     * Unstable angina (CMS/HCC) [I20.0]    Post-procedure diagnosis:   Post-op Diagnosis     * Unstable angina (CMS/HCC) [I20.0]    Procedure(s):     * Left Heart Cath    * IFR (Instant Wave Free Ration)      Procedure Findings:   Moderate disease of the ostium of the RCA in this right dominant system.  Otherwise multiple previously deployed stents were patent.  LAD stent patent however there is a 50% linear stenosis just after the stent seen best in the shallow MICHEL cranial view.  LVEDP was normal with no evidence of aortic stenosis.    IFR was performed in the mid LAD as well as the proximal RCA.  Neither were hemodynamically significant.      Description of the Procedure:   Right radial approach.  6 Azerbaijani sheath.  Diagnostic catheter was a 5 Azerbaijani Gunner catheter.  IR to guide views after 4000 units of heparin was given.  iFR was performed in the LAD and then the RCA.  At the end of the procedure all equipment was removed and a TR band was placed over the right radial insertion site.  15 mL air was inflated in the band and the sheath was removed without difficulty.    Complications:   None    Stents/Implants:       Anticoagulation/Antiplatelet Plan:   4000 units of heparin was given.    Estimated Blood Loss:   5 mL    Anesthesia: Moderate Sedation Anesthesia Staff: No anesthesia staff entered.    Any Specimen(s) Removed:   No specimens collected during this procedure.    Disposition:   Will be transferred back to the floor.      Electronically signed by: Juan Boyer MD, 12/18/2023 11:52 AM

## 2023-12-18 NOTE — H&P
History Of Present Illness  Antonia Rodriguez is a 68 y.o. female presenting with chest pain.     Patient states that she had severe chest pressure that woke her up from sleep around 2am, substernal, radiating to back and neck and right arm associated with nausea.  She took a baby dose 81mg aspirin as well as a sublingual nitroglycerin which she reported did help her symptoms; she did go back to sleep and woke up again with similar type of chest pressure and did take an additional nitroglycerin which did also help although gave her concern enough to call EMS and come in for an additional evaluation.  On arrival to the emergency department, she was still rating her pain as moderate, although improved from the time of onset; she otherwise denied any vomiting, no abdominal pain.    On arrival to the emergency department, she was afebrile, heart rate 86, respiratory rate 18, blood pressure 109/65, SpO2 97% on ambient air; metabolic panel significant for creatinine of 1.15, initial troponin 12 with subsequent repeat 1 hour later 11, no leukocytosis, normal red blood cell count as well as platelets; plain radiograph of the chest showed no acute cardiopulmonary process; she was given an additional sublingual nitroglycerin, a dose of Zofran, cardiology was consulted from the emergency department and she was brought in for observation to the hospitalist service for further workup and management.    Past Medical History  She has a past medical history of Arterial stent thrombosis, initial encounter (CMS/[facility]), Diabetes (CMS/[facility]), Heart failure (CMS/[facility]), Hypertension, Pacemaker, Personal history of other medical treatment, and Sleep apnea.    Surgical History  She has a past surgical history that includes Other surgical history (03/03/2022) and Other surgical history (07/22/2022).     Social History  She reports that she quit smoking about 2 years ago. Her smoking use included cigarettes. She has never used smokeless tobacco.  She reports current alcohol use. She reports that she does not use drugs.    Family History  No family history on file.     Allergies  Iodinated contrast media and Penicillins    Review of Systems   Constitutional:  Positive for activity change. Negative for appetite change, chills, diaphoresis, fatigue and fever.   HENT:  Negative for congestion, facial swelling, sinus pressure and trouble swallowing.    Eyes:  Negative for photophobia, pain, redness and visual disturbance.   Respiratory:  Negative for cough, chest tightness, shortness of breath and wheezing.    Cardiovascular:  Positive for chest pain. Negative for palpitations and leg swelling.        Cardiac pacemaker and chest   Gastrointestinal:  Positive for nausea. Negative for abdominal distention, abdominal pain and vomiting.   Endocrine: Negative for cold intolerance, heat intolerance, polydipsia and polyuria.   Genitourinary:  Negative for difficulty urinating, dysuria, flank pain, frequency and urgency.   Musculoskeletal:  Negative for arthralgias, back pain, joint swelling and myalgias.   Skin:  Negative for color change, rash and wound.   Neurological:  Negative for dizziness, syncope, weakness, light-headedness, numbness and headaches.   Hematological:  Negative for adenopathy. Does not bruise/bleed easily.   Psychiatric/Behavioral:  Negative for agitation and suicidal ideas. The patient is not nervous/anxious.    All other systems reviewed and are negative.       Physical Exam  Vitals reviewed.   Constitutional:       General: She is not in acute distress.     Appearance: Normal appearance. She is not ill-appearing.   HENT:      Head: Normocephalic and atraumatic.      Nose: Nose normal.      Mouth/Throat:      Mouth: Mucous membranes are moist.   Eyes:      Extraocular Movements: Extraocular movements intact.      Pupils: Pupils are equal, round, and reactive to light.   Cardiovascular:      Rate and Rhythm: Normal rate and regular rhythm.       Pulses: Normal pulses.      Heart sounds: Normal heart sounds. No murmur heard.     No friction rub. No gallop.   Pulmonary:      Effort: Pulmonary effort is normal. No respiratory distress.      Breath sounds: Normal breath sounds. No wheezing, rhonchi or rales.   Abdominal:      General: Abdomen is flat. Bowel sounds are normal. There is no distension.      Palpations: Abdomen is soft. There is no mass.      Tenderness: There is no abdominal tenderness. There is no guarding or rebound.   Musculoskeletal:         General: No swelling, tenderness or signs of injury.      Right lower leg: No edema.      Left lower leg: No edema.   Skin:     General: Skin is warm and dry.      Coloration: Skin is not jaundiced or pale.      Findings: No bruising, erythema, lesion or rash.   Neurological:      General: No focal deficit present.      Mental Status: She is alert and oriented to person, place, and time. Mental status is at baseline.      Cranial Nerves: No cranial nerve deficit.      Motor: No weakness.   Psychiatric:         Mood and Affect: Mood normal.         Behavior: Behavior normal.         Thought Content: Thought content normal.         Judgment: Judgment normal.          Last Recorded Vitals  /64   Pulse 76   Temp 36.7 °C (98.1 °F) (Temporal)   Resp 20   Wt 79.1 kg (174 lb 6.1 oz)   SpO2 98%     Relevant Results  Scheduled medications    Continuous medications    PRN medications  Results for orders placed or performed during the hospital encounter of 12/18/23 (from the past 24 hour(s))   CBC and Auto Differential   Result Value Ref Range    WBC 6.0 4.4 - 11.3 x10*3/uL    nRBC 0.0 0.0 - 0.0 /100 WBCs    RBC 5.23 (H) 4.00 - 5.20 x10*6/uL    Hemoglobin 15.5 12.0 - 16.0 g/dL    Hematocrit 47.6 (H) 36.0 - 46.0 %    MCV 91 80 - 100 fL    MCH 29.6 26.0 - 34.0 pg    MCHC 32.6 32.0 - 36.0 g/dL    RDW 14.0 11.5 - 14.5 %    Platelets 251 150 - 450 x10*3/uL    Neutrophils % 68.6 40.0 - 80.0 %    Immature  Granulocytes %, Automated 0.2 0.0 - 0.9 %    Lymphocytes % 16.5 13.0 - 44.0 %    Monocytes % 14.1 2.0 - 10.0 %    Eosinophils % 0.3 0.0 - 6.0 %    Basophils % 0.3 0.0 - 2.0 %    Neutrophils Absolute 4.08 1.20 - 7.70 x10*3/uL    Immature Granulocytes Absolute, Automated 0.01 0.00 - 0.70 x10*3/uL    Lymphocytes Absolute 0.98 (L) 1.20 - 4.80 x10*3/uL    Monocytes Absolute 0.84 0.10 - 1.00 x10*3/uL    Eosinophils Absolute 0.02 0.00 - 0.70 x10*3/uL    Basophils Absolute 0.02 0.00 - 0.10 x10*3/uL   Comprehensive Metabolic Panel   Result Value Ref Range    Glucose 176 (H) 74 - 99 mg/dL    Sodium 138 136 - 145 mmol/L    Potassium 3.9 3.5 - 5.3 mmol/L    Chloride 103 98 - 107 mmol/L    Bicarbonate 23 21 - 32 mmol/L    Anion Gap 16 10 - 20 mmol/L    Urea Nitrogen 23 6 - 23 mg/dL    Creatinine 1.15 (H) 0.50 - 1.05 mg/dL    eGFR 52 (L) >60 mL/min/1.73m*2    Calcium 9.0 8.6 - 10.3 mg/dL    Albumin 4.6 3.4 - 5.0 g/dL    Alkaline Phosphatase 80 33 - 136 U/L    Total Protein 7.3 6.4 - 8.2 g/dL    AST 33 9 - 39 U/L    Bilirubin, Total 0.6 0.0 - 1.2 mg/dL    ALT 25 7 - 45 U/L   Magnesium   Result Value Ref Range    Magnesium 2.15 1.60 - 2.40 mg/dL   Protime-INR   Result Value Ref Range    Protime 12.9 (H) 9.8 - 12.8 seconds    INR 1.1 0.9 - 1.1   Troponin I, High Sensitivity, Initial   Result Value Ref Range    Troponin I, High Sensitivity 12 0 - 13 ng/L   Troponin, High Sensitivity, 1 Hour   Result Value Ref Range    Troponin I, High Sensitivity 11 0 - 13 ng/L     XR chest 1 view    Result Date: 12/18/2023  Interpreted By:  Valerio Tatum, STUDY: XR CHEST 1 VIEW;  12/18/2023 5:28 am   INDICATION: Signs/Symptoms:Chest Pain.   COMPARISON: Chest radiograph dated 06/08/2022.   ACCESSION NUMBER(S): [id number]   ORDERING CLINICIAN: CARO TAPIA   FINDINGS:   CARDIOMEDIASTINAL SILHOUETTE: Cardiomediastinal silhouette is normal in size and configuration. There is unchanged left subclavian approach triple lead pacemaker/AICD.    LUNGS/PLEURA: There are no consolidations.There are no pleural effusions. There is no demonstrated pneumothorax.     BONES: No evidence of acute osseous abnormality.       1.  No evidence of acute cardiopulmonary process.     Signed by: Valerio Tatum 12/18/2023 5:54 AM Dictation workstation:   [id number]    ECG 12 lead (Clinic Performed)    Result Date: 12/14/2023  Biventricular pacemaker at a rate of 86 bpm   QTc 552 *Please refer to scanned ECG for final report*    ECG 12 lead (Clinic Performed)    Result Date: 12/14/2023  Biventricular pacemaker at a rate of 86 bpm   QTc 552            Assessment/Plan     This is a very pleasant 68-year-old lady with a known history of CAD, dyslipidemia, HFrEF with BiV ICD, chronic GERD, controlled type 2 diabetes mellitus who is presenting with substernal chest pain concerning for angina; negative ACS workup so far, plan for coronary angiography today for further workup, will make NPO.    Principal Problem:    Unstable angina (CMS/[facility])  Active Problems:    Hypertension    CAD (coronary artery disease)    Chronic combined systolic and diastolic heart failure (CMS/HCC)    Dyslipidemia    Biventricular implantable cardioverter-defibrillator (ICD) in situ    JOSE ARMANDO (obstructive sleep apnea)    Chronic GERD    Controlled type 2 diabetes mellitus without complication, without long-term current use of insulin (CMS/HCC)    Plan  -Observation with telemetry  -Cardiology consult  -home medication reconciliation pending  -continue statin and ASA pending formal med rec  -NPO then carb controlled diet  -SSI, hypoglycemic   -trend lipids with AM labs, obtain third troponin  -FULL CODE    Nicholas oByer MD

## 2023-12-18 NOTE — ED PROVIDER NOTES
HPI   No chief complaint on file.      Patient is a 68-year-old female with history of previous MI, diabetes, hypertension as well as previous pacemaker and multiple coronary stents who presents to the ER due to concern for chest pain.  Patient states that she had severe chest pressure that woke her up from sleep around 2 AM this morning.  She states that it radiates to her neck and back.  She did report some nausea.  She took baby aspirin as well as nitro at home.  Her nitro initially did help however she after she went back to sleep she again woke with chest pressure prompting her call 911.  EMS gave her additional dose of nitroglycerin.  Currently rates her pain 4 out of 10.  She denies any tearing back pain.  She denies any abdominal pain.  She does report some right arm discomfort.  Dr. Boyer is the patient's cardiologist.                          No data recorded                Patient History   Past Medical History:   Diagnosis Date   • Arterial stent thrombosis, initial encounter (CMS/[facility])     7 stents - pt has defiberilator   • Diabetes (CMS/[facility])    • Heart failure (CMS/[facility])    • Hypertension    • Pacemaker    • Personal history of other medical treatment     History of echocardiogram   • Sleep apnea      Past Surgical History:   Procedure Laterality Date   • OTHER SURGICAL HISTORY  2022    Cardiac catheterization with stent placement   • OTHER SURGICAL HISTORY  2022    Cardioverter defibrillator insertion     No family history on file.  Social History     Tobacco Use   • Smoking status: Former     Types: Cigarettes     Quit date: 2021     Years since quittin.1   • Smokeless tobacco: Never   Substance Use Topics   • Alcohol use: Yes     Comment: occasional   • Drug use: Never       Physical Exam   ED Triage Vitals   Temp Pulse Resp BP   -- -- -- --      SpO2 Temp src Heart Rate Source Patient Position   -- -- -- --      BP Location FiO2 (%)     -- --       Physical Exam  Vitals and  nursing note reviewed.   Constitutional:       General: She is not in acute distress.     Appearance: She is well-developed.   HENT:      Head: Normocephalic and atraumatic.   Eyes:      Conjunctiva/sclera: Conjunctivae normal.   Cardiovascular:      Rate and Rhythm: Normal rate and regular rhythm.      Heart sounds: No murmur heard.     Comments: Radial pulse +2 bilaterally  Pulmonary:      Effort: Pulmonary effort is normal. No respiratory distress.      Breath sounds: Normal breath sounds.   Abdominal:      Palpations: Abdomen is soft.      Tenderness: There is no abdominal tenderness.   Musculoskeletal:         General: No swelling.      Cervical back: Neck supple.   Skin:     General: Skin is warm and dry.      Capillary Refill: Capillary refill takes less than 2 seconds.   Neurological:      General: No focal deficit present.      Mental Status: She is alert and oriented to person, place, and time.   Psychiatric:         Mood and Affect: Mood normal.         ED Course & MDM   ED Course as of 12/18/23 0550   Mon Dec 18, 2023   0517 Patient is a 68-year-old female with history of previous MI with multiple coronary stents who presents to the emergency department today due to concern for chest pain and pressure.  On arrival, patient had previously received for BB aspirin at home as well as multiple doses of nitroglycerin.  Vital signs are stable.  She still endorsing 4/10 chest pressure.  Stat EKG did not reveal any acute ischemic injury pattern. [MJ]   0532 HEMOGLOBIN: 15.5 [MJ]   0532 WBC: 6.0 [MJ]   0532 Platelets: 251 [MJ]      ED Course User Index  [MJ] Jp Mason, DO       Medical Decision Making      Procedure  Procedures     Elliot Dunlap DO  12/18/23 0582
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
## 2023-12-18 NOTE — DISCHARGE SUMMARY
Discharge Diagnosis  Unstable angina (CMS/[facility])    Issues Requiring Follow-Up  #1  It was a pleasure to meet you in the hospital  #2  You presented with chest pain  #3  You had a a heart cath, this showed no significant blockages that required new stents  #4  You can safely continue to take your current medications and you can discharge from the hospital  #5  You should follow-up with your primary care physician in the next two weeks of discharge from the hospital    Test Results Pending At Discharge  Pending Labs       No current pending labs.            Hospital Course    Pertinent Physical Exam At Time of Discharge  Physical Exam    Vitals reviewed.   Constitutional:       General: She is not in acute distress.     Appearance: Normal appearance. She is not ill-appearing.   HENT:      Head: Normocephalic and atraumatic.      Nose: Nose normal.      Mouth/Throat:      Mouth: Mucous membranes are moist.   Eyes:      Extraocular Movements: Extraocular movements intact.      Pupils: Pupils are equal, round, and reactive to light.   Cardiovascular:      Rate and Rhythm: Normal rate and regular rhythm.      Pulses: Normal pulses.      Heart sounds: Normal heart sounds. No murmur heard.     No friction rub. No gallop.   Pulmonary:      Effort: Pulmonary effort is normal. No respiratory distress.      Breath sounds: Normal breath sounds. No wheezing, rhonchi or rales.   Abdominal:      General: Abdomen is flat. Bowel sounds are normal. There is no distension.      Palpations: Abdomen is soft. There is no mass.      Tenderness: There is no abdominal tenderness. There is no guarding or rebound.   Musculoskeletal:         General: No swelling, tenderness or signs of injury.      Right lower leg: No edema.      Left lower leg: No edema.   Skin:     General: Skin is warm and dry.      Coloration: Skin is not jaundiced or pale.      Findings: No bruising, erythema, lesion or rash.   Neurological:      General: No focal deficit  present.      Mental Status: She is alert and oriented to person, place, and time. Mental status is at baseline.      Cranial Nerves: No cranial nerve deficit.      Motor: No weakness.   Psychiatric:         Mood and Affect: Mood normal.         Behavior: Behavior normal.         Thought Content: Thought content normal.         Judgment: Judgment normal.     Home Medications  Antonia Rodriguez is a 68 y.o. female presenting with chest pain.     Patient states that she had severe chest pressure that woke her up from sleep around 2am, substernal, radiating to back and neck and right arm associated with nausea.  She took a baby dose 81mg aspirin as well as a sublingual nitroglycerin which she reported did help her symptoms; she did go back to sleep and woke up again with similar type of chest pressure and did take an additional nitroglycerin which did also help although gave her concern enough to call EMS and come in for an additional evaluation.  On arrival to the emergency department, she was still rating her pain as moderate, although improved from the time of onset; she otherwise denied any vomiting, no abdominal pain.     On arrival to the emergency department, she was afebrile, heart rate 86, respiratory rate 18, blood pressure 109/65, SpO2 97% on ambient air; metabolic panel significant for creatinine of 1.15, initial troponin 12 with subsequent repeat 1 hour later 11, no leukocytosis, normal red blood cell count as well as platelets; plain radiograph of the chest showed no acute cardiopulmonary process; she was given an additional sublingual nitroglycerin, a dose of Zofran, cardiology was consulted from the emergency department and she was brought in for observation to the hospitalist service for further workup and management.    Patient was seen in consultation by cardiology, she was taken to the coronary catheterization laboratory, there was moderate disease of the ostium of the RCA and her right dominant  system, there was a prior LAD stent showing to be patent although to have some stenosis; IFR was performed on the mid LAD lesion as well as a proximal RCA lesion however neither were determined to be hemodynamically significant and not  thought to be the culprit of her symptoms, the patient was ultimately determined to be an appropriate candidate for discharge from the hospital on her current medication regimen and was instructed to follow-up with her primary care physician within 2 weeks of discharge.    Greater than 30 minutes was spent facilitating this patients discharge from the hospital which included examining the patient, reconciling medications, and making arrangements for future care.  Nicholas Boyer MD  [facility]  Internal Medicine    This document was generated in whole or in part using the Dragon One medical voice recognition software and there may be some incorrect words/wording, spelling, or punctuation errors that were not corrected prior to finalization in the medical record.         Medication List      ASK your doctor about these medications     ascorbic acid 500 mg tablet; Commonly known as: Vitamin C; Ask about:   Which instructions should I use?   aspirin 81 mg EC tablet; TAKE 1 TABLET BY MOUTH DAILY   atorvastatin 80 mg tablet; Commonly known as: Lipitor   busPIRone 5 mg tablet; Commonly known as: Buspar; Take 3 tablets (15 mg)   by mouth 2 times a day as needed (As needed for anxiety).   cyanocobalamin 100 mcg tablet; Commonly known as: Vitamin B-12   dapagliflozin propanediol 10 mg; Commonly known as: Farxiga; Take 1   tablet (10 mg) by mouth once daily.   Entresto 24-26 mg tablet; Generic drug: sacubitriL-valsartan   fluticasone 50 mcg/actuation nasal spray; Commonly known as: Flonase   furosemide 40 mg tablet; Commonly known as: Lasix; Take 1 tablet (40 mg)   by mouth once daily.   isosorbide mononitrate ER 60 mg 24 hr tablet; Commonly known as: Imdur   nitroglycerin 0.4 mg  SL tablet; Commonly known as: Nitrostat; Ask about:   Which instructions should I use?   pantoprazole 40 mg EC tablet; Commonly known as: ProtoNix   sotalol 120 mg tablet; Commonly known as: Betapace; TAKE 1 (ONE) TABLET   BY MOUTH TWICE DAILY   spironolactone 25 mg tablet; Commonly known as: Aldactone; Take 1 tablet   (25 mg) by mouth once daily.   * Trulicity 1.5 mg/0.5 mL pen injector injection; Generic drug:   dulaglutide; Inject 1.5 mg under the skin 1 (one) time per week.   * Trulicity 0.75 mg/0.5 mL pen injector; Generic drug: dulaglutide;   Inject 0.75 mg under the skin 1 (one) time per week.   VITAMIN D3 ORAL  * This list has 2 medication(s) that are the same as other medications   prescribed for you. Read the directions carefully, and ask your doctor or   other care provider to review them with you.       Outpatient Follow-Up  Future Appointments   Date Time Provider Department [place]   2/1/2024  9:00 AM Amy Frost DO [id number] [place]   5/30/2024  9:15 AM Juan Boyer MD [id number] [place]   12/5/2024  1:15 PM Clement Perez MD [id number] [place]       Nicholas Boyer MD

## 2023-12-18 NOTE — PROGRESS NOTES
Patient lives with her . Bother her son and daughter live in the area. Pharmacy is [facility] in [place]. PCP is Dr. Del Castillo and saw 1 month ago.  Independent with all her own care. No home going needs at this time.    none

## 2023-12-18 NOTE — DISCHARGE INSTRUCTIONS
#1  It was a pleasure to meet you in the hospital  #2  You presented with chest pain  #3  You had a a heart cath, this showed no significant blockages that required new stents  #4  You can safely continue to take your current medications and you can discharge from the hospital  #5  You should follow-up with your primary care physician in the next two weeks of discharge from the hospital

## 2023-12-18 NOTE — PROGRESS NOTES
Lives in apartment by herself. Her son lives in the same building. Daughter is here with her at this time.  Dr. Cuevas is her PCP saw in October 2023. Cardiac cath is scheduled for today. Independent with all aspects of her care.  No home going needs at this time.   Asuncion Anderson RN

## 2023-12-19 ENCOUNTER — PATIENT OUTREACH (OUTPATIENT)
Dept: CARE COORDINATION | Facility: CLINIC | Age: 68
End: 2023-12-19
Payer: COMMERCIAL

## 2023-12-19 NOTE — PROGRESS NOTES
Discharge Facility: [facility]  Discharge Diagnosis: Unstable Angina  Admission Date: 12/18/2023  Discharge Date: 12/18/2023    PCP Appointment Date:  -Pt is interested in switching PCP; pt given phone number to [facility] [phone]; Will send message to office to see if pt able to be seen    Hospital Encounter and Summary: Linked     See discharge assessment below for further details    Engagement  Call Start Time: 0932 (12/19/2023  9:32 AM)    Medications  Medications reviewed with patient/caregiver?: Not applicable (12/19/2023  9:32 AM)  Does the patient have all medications ordered at discharge?: Not applicable (12/19/2023  9:32 AM)  Care Management Interventions: No intervention needed (12/19/2023  9:32 AM)  Is the patient taking all medications as directed (includes completed medication regime)?: Yes (12/19/2023  9:32 AM)    Appointments  Does the patient have a primary care provider?: Yes (12/19/2023  9:32 AM)  Care Management Interventions: Advised patient to make appointment (12/19/2023  9:32 AM)  Has the patient kept scheduled appointments due by today?: Yes (12/19/2023  9:32 AM)    Self Management  Has home health visited the patient within 72 hours of discharge?: Not applicable (12/19/2023  9:32 AM)    Patient Teaching  Does the patient have access to their discharge instructions?: Yes (12/19/2023  9:32 AM)  Care Management Interventions: Reviewed instructions with patient (12/19/2023  9:32 AM)  What is the patient's perception of their health status since discharge?: Improving (12/19/2023  9:32 AM)  Is the patient/caregiver able to teach back the hierarchy of who to call/visit for symptoms/problems? PCP, Specialist, Home Health nurse, Urgent Care, ED, 911: Yes (12/19/2023  9:32 AM)    Wrap Up  Wrap Up Additional Comments: Pt was admitted to [facility] 12/18/2023 under observation for unstable angina. Pt reports doing well since discharge. Pt discharged with no new  medication. Pt reports understanding of discharge instructions. Pt had left heart catheterization that showed no blockage. Pt is interested in switching PCP; pt given phone number for [facility] office to see if she can establish new PCP. Message sent to clerical pool for assistance. Pt denies any other questions, needs, or concerns at this time. She is given call back number and encouraged to call if questions or needs arise. (12/19/2023  9:32 AM)  Call End Time: 0945 (12/19/2023  9:32 AM)

## 2024-01-03 ENCOUNTER — OFFICE VISIT (OUTPATIENT)
Dept: PRIMARY CARE | Facility: CLINIC | Age: 69
End: 2024-01-03
Payer: COMMERCIAL

## 2024-01-03 VITALS
TEMPERATURE: 97.5 F | OXYGEN SATURATION: 96 % | BODY MASS INDEX: 27.76 KG/M2 | DIASTOLIC BLOOD PRESSURE: 59 MMHG | WEIGHT: 172 LBS | HEART RATE: 77 BPM | SYSTOLIC BLOOD PRESSURE: 80 MMHG | RESPIRATION RATE: 16 BRPM

## 2024-01-03 DIAGNOSIS — E11.9 CONTROLLED TYPE 2 DIABETES MELLITUS WITHOUT COMPLICATION, WITHOUT LONG-TERM CURRENT USE OF INSULIN (MULTI): ICD-10-CM

## 2024-01-03 DIAGNOSIS — N18.31 STAGE 3A CHRONIC KIDNEY DISEASE (MULTI): ICD-10-CM

## 2024-01-03 DIAGNOSIS — K21.9 CHRONIC GERD: ICD-10-CM

## 2024-01-03 DIAGNOSIS — I50.42 CHRONIC COMBINED SYSTOLIC AND DIASTOLIC HEART FAILURE (MULTI): ICD-10-CM

## 2024-01-03 DIAGNOSIS — Z95.810 BIVENTRICULAR IMPLANTABLE CARDIOVERTER-DEFIBRILLATOR (ICD) IN SITU: Primary | ICD-10-CM

## 2024-01-03 DIAGNOSIS — E66.3 OVERWEIGHT (BMI 25.0-29.9): ICD-10-CM

## 2024-01-03 DIAGNOSIS — G47.33 OSA (OBSTRUCTIVE SLEEP APNEA): ICD-10-CM

## 2024-01-03 DIAGNOSIS — I25.5 ISCHEMIC CARDIOMYOPATHY: ICD-10-CM

## 2024-01-03 DIAGNOSIS — I25.110 CORONARY ARTERY DISEASE INVOLVING NATIVE CORONARY ARTERY OF NATIVE HEART WITH UNSTABLE ANGINA PECTORIS (MULTI): ICD-10-CM

## 2024-01-03 DIAGNOSIS — E11.42 DIABETIC POLYNEUROPATHY ASSOCIATED WITH TYPE 2 DIABETES MELLITUS (MULTI): ICD-10-CM

## 2024-01-03 DIAGNOSIS — E11.9 CONTROLLED TYPE 2 DIABETES MELLITUS WITHOUT COMPLICATION, WITHOUT LONG-TERM CURRENT USE OF INSULIN (MULTI): Primary | ICD-10-CM

## 2024-01-03 DIAGNOSIS — I20.0 UNSTABLE ANGINA PECTORIS (MULTI): ICD-10-CM

## 2024-01-03 DIAGNOSIS — E11.3299 BACKGROUND DIABETIC RETINOPATHY (MULTI): ICD-10-CM

## 2024-01-03 PROBLEM — M25.421 SWELLING OF RIGHT ELBOW: Status: RESOLVED | Noted: 2023-02-19 | Resolved: 2024-01-03

## 2024-01-03 PROBLEM — Z79.85 LONG-TERM (CURRENT) USE OF INJECTABLE NON-INSULIN ANTIDIABETIC DRUGS: Status: ACTIVE | Noted: 2023-03-13

## 2024-01-03 PROBLEM — R42 DIZZINESS: Status: ACTIVE | Noted: 2023-02-01

## 2024-01-03 PROBLEM — M70.21 OLECRANON BURSITIS OF RIGHT ELBOW: Status: RESOLVED | Noted: 2023-02-19 | Resolved: 2024-01-03

## 2024-01-03 PROBLEM — I42.0: Status: ACTIVE | Noted: 2022-07-15

## 2024-01-03 PROBLEM — G80.9 CEREBRAL PALSY (MULTI): Status: ACTIVE | Noted: 2024-01-03

## 2024-01-03 PROBLEM — N89.8 PRURITUS OF VAGINA: Status: ACTIVE | Noted: 2020-03-05

## 2024-01-03 PROBLEM — L30.9 DERMATITIS: Status: RESOLVED | Noted: 2023-02-19 | Resolved: 2024-01-03

## 2024-01-03 PROBLEM — I34.0 MITRAL VALVE INSUFFICIENCY: Status: ACTIVE | Noted: 2022-07-09

## 2024-01-03 PROBLEM — S91.109A OPEN WOUND OF TOE: Status: RESOLVED | Noted: 2023-02-19 | Resolved: 2024-01-03

## 2024-01-03 PROBLEM — R39.9 UTI SYMPTOMS: Status: RESOLVED | Noted: 2023-02-19 | Resolved: 2024-01-03

## 2024-01-03 PROBLEM — L97.529 ULCER OF TOE OF LEFT FOOT (MULTI): Status: ACTIVE | Noted: 2022-08-02

## 2024-01-03 PROBLEM — I95.9 HYPOTENSION: Status: ACTIVE | Noted: 2024-01-03

## 2024-01-03 PROBLEM — L03.211 CELLULITIS OF FACE: Status: RESOLVED | Noted: 2023-02-19 | Resolved: 2024-01-03

## 2024-01-03 PROBLEM — J44.9 CHRONIC OBSTRUCTIVE PULMONARY DISEASE (MULTI): Status: ACTIVE | Noted: 2023-03-13

## 2024-01-03 PROBLEM — R74.01 ELEVATION OF LEVELS OF LIVER TRANSAMINASE LEVELS: Status: ACTIVE | Noted: 2022-07-09

## 2024-01-03 PROBLEM — I47.0 RE-ENTRY VENTRICULAR ARRHYTHMIA (MULTI): Status: ACTIVE | Noted: 2024-01-03

## 2024-01-03 PROBLEM — I21.9 MYOCARDIAL INFARCTION (MULTI): Status: ACTIVE | Noted: 2024-01-03

## 2024-01-03 PROCEDURE — 99214 OFFICE O/P EST MOD 30 MIN: CPT | Performed by: NURSE PRACTITIONER

## 2024-01-03 ASSESSMENT — ENCOUNTER SYMPTOMS
DIZZINESS: 0
FATIGUE: 1
CHILLS: 0
FEVER: 0
PALPITATIONS: 1
LIGHT-HEADEDNESS: 0
HEADACHES: 1

## 2024-01-03 NOTE — PROGRESS NOTES
Subjective   Antonia Rodriguez is a 69 y.o. female who presents for Establish Care (Pt seeking a new PCP per her preference. She also sees Dr. Juan Boyer for cardiology and Dr. CHANDRA Perez for EP. Reports recent hospitalization for unstable angina on 12/18/23. Cardiac cath done and no new interventions performed. She also had a STEMI in 2021 and reports she has 7 cardiac stents and a PPM/AICD. No complaints today.).  HPI She does water aerobics for exercise. She has cut down to a 1 hour class three days per week. Her EF increased from 25-30% 2 years ago to 55-60% in 12/2023.   Review of Systems   Constitutional:  Positive for fatigue (occasional). Negative for chills and fever.   Cardiovascular:  Positive for chest pain (had an episode 3 weeks ago, was hospitalizaed) and palpitations (less frequent than they had been previously).   Neurological:  Positive for headaches (when blood pressure is increased, especially diastolic). Negative for dizziness and light-headedness.   Objective   Physical Exam  Vitals reviewed.   Constitutional:       Appearance: Normal appearance.   HENT:      Head: Normocephalic.   Eyes:      Conjunctiva/sclera: Conjunctivae normal.   Cardiovascular:      Rate and Rhythm: Normal rate and regular rhythm.      Pulses: Normal pulses.      Heart sounds: No murmur heard.     Comments: Presence of left chest PPM/AICD  Pulmonary:      Effort: Pulmonary effort is normal.      Breath sounds: Normal breath sounds.   Abdominal:      General: Bowel sounds are normal.      Palpations: Abdomen is soft.   Musculoskeletal:      Cervical back: Neck supple.      Right lower leg: No edema.      Left lower leg: No edema.   Skin:     General: Skin is warm and dry.   Neurological:      General: No focal deficit present.      Mental Status: She is alert.   Psychiatric:         Mood and Affect: Mood normal.         Thought Content: Thought content normal.     BP 80/59   Pulse 77   Temp 36.4 °C (97.5 °F)   Resp 16    Wt 78 kg (172 lb)   LMP  (LMP Unknown)   SpO2 96%   BMI 27.76 kg/m²   Assessment/Plan   Problem List Items Addressed This Visit       CAD (coronary artery disease)    Chronic combined systolic and diastolic heart failure (CMS/HCC)     Well controlled on today's exam with no peripheral edema or adventitious lung sounds. Advised pt to stay well-hydrated to avoid hypotensive episodes. Pt. Is managed by cardiology and EP.         Ischemic cardiomyopathy    Biventricular implantable cardioverter-defibrillator (ICD) in situ - Primary     [employer]. Interrogated 11/2023.         Overweight (BMI 25.0-29.9)    Diabetic polyneuropathy associated with type 2 diabetes mellitus (CMS/[facility])    JOSE ARMANDO (obstructive sleep apnea)     States this was mild and did not require CPAP.         Background diabetic retinopathy (CMS/[facility])     Sees a retina specialist (Dr. Mckeon)-last saw 6/2023. Also sees Dr. Murray for anterior eye care.         Chronic GERD    Controlled type 2 diabetes mellitus without complication, without long-term current use of insulin (CMS/[facility])     Lab Results   Component Value Date    HGBA1C 7.3 (H) 10/02/2023    Well-controlled. Trulicity requires PA for 2024. Cass Ybarra, [facility] assisting with that. Repeat A1C 4/2024.         Stage 3a chronic kidney disease (CMS/[facility])     Adequately controlled. Avoid nephrotoxins.         Unstable angina pectoris (CMS/[facility])     Hospitalized 12/18/2023. Cardiac cath w/ EF 55-60% and no new interventions required at that time. Continue current cardiac meds and PRN nitroglycerin SL. Follows w/ Dr. Juan Boyer (Cards) and Dr. Clement Perez (EP).        Follow-up in 3 months.

## 2024-01-03 NOTE — ASSESSMENT & PLAN NOTE
New onset late 2023. Hospitalized 12/18/2023. Continue current cardiac meds and PRN nitroglycerin SL. Follows w/ Dr. Juan Boyer (Cards) and Dr. Clement Perez (EP).

## 2024-01-03 NOTE — ASSESSMENT & PLAN NOTE
Sees a retina specialist (Dr. Mckeon)-last saw 6/2023. Also sees Dr. Murray for anterior eye care.

## 2024-01-03 NOTE — ASSESSMENT & PLAN NOTE
Well controlled on today's exam with no peripheral edema or adventitious lung sounds. Advised pt to stay well-hydrated to avoid hypotensive episodes. Pt. Is managed by cardiology and EP.

## 2024-01-03 NOTE — ASSESSMENT & PLAN NOTE
She is hypotensive on today's exam at 80/59, but is asymptomatic. She reports BPs run on low side with her current cardiac regimen. Reviewed home BP/HR which are generally very well controlled with a few low readings. Continue current medications, which are managed by cardiology.

## 2024-01-03 NOTE — ASSESSMENT & PLAN NOTE
Hospitalized 12/18/2023. Cardiac cath w/ EF 55-60% and no new interventions required at that time. Continue current cardiac meds and PRN nitroglycerin SL. Follows w/ Dr. Juan Boyer (Cards) and Dr. Clement Perez (EP).

## 2024-01-03 NOTE — ASSESSMENT & PLAN NOTE
Lab Results   Component Value Date    HGBA1C 7.3 (H) 10/02/2023    Well-controlled. Trulicity requires PA for 2024. Cass Ybarra [facility] assisting with that. Repeat A1C 4/2024.

## 2024-01-05 ENCOUNTER — PATIENT OUTREACH (OUTPATIENT)
Dept: CARE COORDINATION | Facility: CLINIC | Age: 69
End: 2024-01-05
Payer: COMMERCIAL

## 2024-01-10 NOTE — PROGRESS NOTES
Pharmacy Post-Discharge Visit  Antonia Rodriguez is a 69 y.o. female was referred to Clinical Pharmacy Team to complete a post-discharge medication optimization and monitoring visit.  The patient was referred for their diabetes, COPD and HF.    Admission Date: 12/18/2023  Discharge Date: 12/18/2023    Referring Provider: AYE Denise    Subjective   Allergies   Allergen Reactions    Iodinated Contrast Media Hives    Penicillins Hives     Years ago       [id number] - [place] - [address] [facility]  [address] [facility] [address]  Phone: [phone] Fax: [phone]    Social History     Social History Narrative    Not on file     Notable Medication changes following discharge:  -none    Diabetes  She presents for her initial diabetic visit. She has type 2 diabetes mellitus. There are no hypoglycemic associated symptoms. There are no hypoglycemic complications. Current diabetic treatment includes oral agent (monotherapy) (and weekly GLP-1).     Current DM Pharmacotherapy:   -Trulicity 1.5mg weekly  -Farxiga 10mg daily    SECONDARY PREVENTION  - Statin? Yes  - ACE-I/ARB? No  - Aspirin? Yes    Current monitoring regimen:   Patient is using: glucometer    SMBG Readings: 141, 198, 189, 137, 114    Any episodes of hypoglycemia? No  Hypoglycemia awareness? No    Pertinent PMH Review:  - PMH of Pancreatitis: No  - PMH/FH of Medullary Thyroid Cancer: No  - PMH of Retinopathy: No  - PMH of Urinary Tract Infections: No    CHF Assessment    Symptom/Staging:  -Most recent ejection fraction: 55-60%  -Weight changes?: No    Guideline-Directed Medical Therapy:  -ARNI: Yes, describe: Entresto 24-26  -Beta Blocker: Yes, describe: sotalol  -MRA: Yes, describe: spironolactone 25mg   -SGLT2i: Yes, describe: farxiga 10mg daily    Secondary Prevention:  -The ASCVD Risk score (Bibiana LOPEZ, et al., 2019) failed to calculate for the following reasons:    The patient has a prior MI or stroke diagnosis    -Aspirin 81mg? yes  -Statin?: Yes, describe: atorvastatin 80mg nightly  -HTN?: Yes, describe: Entresto 24-26 bid    Objective     LMP  (LMP Unknown)      LAB  Lab Results   Component Value Date    BILITOT 0.6 12/18/2023    CALCIUM 9.0 12/18/2023    CO2 23 12/18/2023     12/18/2023    CREATININE 1.15 (H) 12/18/2023    GLUCOSE 176 (H) 12/18/2023    ALKPHOS 80 12/18/2023    K 3.9 12/18/2023    PROT 7.3 12/18/2023     12/18/2023    AST 33 12/18/2023    ALT 25 12/18/2023    BUN 23 12/18/2023    ANIONGAP 16 12/18/2023    MG 2.15 12/18/2023    PHOS 4.0 07/09/2022    ALBUMIN 4.6 12/18/2023    EGFR 52 (L) 12/18/2023     Lab Results   Component Value Date    TRIG 88 03/10/2023    CHOL 95 03/10/2023    HDL 40.5 03/10/2023     Lab Results   Component Value Date    HGBA1C 7.3 (H) 10/02/2023     Current Outpatient Medications on File Prior to Visit   Medication Sig Dispense Refill    ascorbic acid (Vitamin C) 500 mg tablet Take 1 tablet (500 mg) by mouth once daily.      aspirin 81 mg EC tablet TAKE 1 TABLET BY MOUTH DAILY 90 tablet 1    atorvastatin (Lipitor) 80 mg tablet Take 1 tablet (80 mg) by mouth once daily at bedtime.      busPIRone (Buspar) 5 mg tablet Take 3 tablets (15 mg) by mouth 2 times a day as needed (As needed for anxiety). 60 tablet 1    cholecalciferol, vitamin D3, (VITAMIN D3 ORAL) Take by mouth once daily. Unknown dose      cyanocobalamin (Vitamin B-12) 100 mcg tablet Take 1 tablet (100 mcg) by mouth once daily.      dapagliflozin (Farxiga) 10 mg Take 1 tablet (10 mg) by mouth once daily. 90 tablet 2    dulaglutide (Trulicity) 0.75 mg/0.5 mL pen injector Inject 0.75 mg under the skin 1 (one) time per week. 2 mL 11    dulaglutide (Trulicity) 1.5 mg/0.5 mL pen injector injection Inject 1.5 mg under the skin 1 (one) time per week. 2 mL 3    fluticasone (Flonase) 50 mcg/actuation nasal spray Administer 1 spray into affected nostril(s) once daily as needed.      furosemide (Lasix) 40 mg tablet Take 1  tablet (40 mg) by mouth once daily. (Patient taking differently: Take 1 tablet (40 mg) by mouth if needed.) 20 tablet 3    isosorbide mononitrate ER (Imdur) 60 mg 24 hr tablet Take 1 tablet (60 mg) by mouth once daily.      nitroglycerin (Nitrostat) 0.4 mg SL tablet Place 1 tablet (0.4 mg) under the tongue every 5 minutes if needed for chest pain.      pantoprazole (ProtoNix) 40 mg EC tablet Take 1 tablet (40 mg) by mouth once daily. Do not crush, chew, or split.      sacubitriL-valsartan (Entresto) 24-26 mg tablet Take 1 tablet by mouth 2 times a day.      sotalol (Betapace) 120 mg tablet TAKE 1 (ONE) TABLET BY MOUTH TWICE DAILY 180 tablet 2    spironolactone (Aldactone) 25 mg tablet Take 1 tablet (25 mg) by mouth once daily. 90 tablet 3     No current facility-administered medications on file prior to visit.     Assessment/Plan   Review medication list and recent hospital discharge and answered all patient questions on medication. Patient states she received a letter from insurance that they need a PA for Trulicity, however, ran test claim for medication and Trulicity is still covered for $0 copay. Patient reviewed BG log and fasting BG was variable. Patient attributes BG reading due to having a lot of parties this past month. Will follow up in 2 weeks to review BG and BP log and make recommendations if needed.    Plan:  Follow up in 2 weeks to review BG and BP readings    Danae BrushD, [facility]    Verbal consent to manage patient's drug therapy was obtained from the patient. They were informed they may decline to participate or withdraw from participation in pharmacy services at any time.

## 2024-01-11 ENCOUNTER — TELEMEDICINE (OUTPATIENT)
Dept: PHARMACY | Facility: HOSPITAL | Age: 69
End: 2024-01-11
Payer: COMMERCIAL

## 2024-01-11 DIAGNOSIS — E11.9 CONTROLLED TYPE 2 DIABETES MELLITUS WITHOUT COMPLICATION, WITHOUT LONG-TERM CURRENT USE OF INSULIN (MULTI): ICD-10-CM

## 2024-01-17 ENCOUNTER — TELEPHONE (OUTPATIENT)
Dept: CARDIOLOGY | Facility: CLINIC | Age: 69
End: 2024-01-17
Payer: COMMERCIAL

## 2024-01-17 NOTE — TELEPHONE ENCOUNTER
"Maryjo would like to \"stop her Entresto for a couple of days\" or decrease dose she is having low BP and dizziness.  "

## 2024-01-18 DIAGNOSIS — E78.5 DYSLIPIDEMIA: ICD-10-CM

## 2024-01-18 RX ORDER — ATORVASTATIN CALCIUM 80 MG/1
80 TABLET, FILM COATED ORAL NIGHTLY
Qty: 90 TABLET | Refills: 3 | Status: SHIPPED | OUTPATIENT
Start: 2024-01-18 | End: 2025-01-17

## 2024-01-22 DIAGNOSIS — I25.10 ATHEROSCLEROTIC HEART DISEASE OF NATIVE CORONARY ARTERY WITHOUT ANGINA PECTORIS: ICD-10-CM

## 2024-01-22 DIAGNOSIS — I50.42 CHRONIC COMBINED SYSTOLIC (CONGESTIVE) AND DIASTOLIC (CONGESTIVE) HEART FAILURE (MULTI): ICD-10-CM

## 2024-01-23 RX ORDER — NITROGLYCERIN 0.4 MG/1
TABLET SUBLINGUAL
Qty: 25 TABLET | Refills: 3 | Status: SHIPPED | OUTPATIENT
Start: 2024-01-23 | End: 2024-04-04 | Stop reason: SDUPTHER

## 2024-01-23 RX ORDER — ASPIRIN 81 MG/1
81 TABLET ORAL DAILY
Qty: 90 TABLET | Refills: 3 | Status: SHIPPED | OUTPATIENT
Start: 2024-01-23

## 2024-01-25 ENCOUNTER — TELEMEDICINE (OUTPATIENT)
Dept: PHARMACY | Facility: HOSPITAL | Age: 69
End: 2024-01-25
Payer: COMMERCIAL

## 2024-01-25 DIAGNOSIS — E11.9 CONTROLLED TYPE 2 DIABETES MELLITUS WITHOUT COMPLICATION, WITHOUT LONG-TERM CURRENT USE OF INSULIN (MULTI): ICD-10-CM

## 2024-01-25 RX ORDER — DULAGLUTIDE 3 MG/.5ML
3 INJECTION, SOLUTION SUBCUTANEOUS
Qty: 2 ML | Refills: 1 | Status: SHIPPED | OUTPATIENT
Start: 2024-01-25 | End: 2024-02-15 | Stop reason: DRUGHIGH

## 2024-01-25 NOTE — PROGRESS NOTES
Pharmacy Post-Discharge Visit  Antonia Rodriguez is a 69 y.o. female was referred to Clinical Pharmacy Team to complete a post-discharge medication optimization and monitoring visit.  The patient was referred for their diabetes, COPD and HF.    Admission Date: 12/18/2023  Discharge Date: 12/18/2023    Referring Provider: AYE Denise    Subjective   Allergies   Allergen Reactions    Iodinated Contrast Media Hives    Penicillins Hives     Years ago       [id number] - [place], [place] - [address] [facility]  [address] [facility] [address]  Phone: [phone] Fax: [phone]    Social History     Social History Narrative    Not on file     Notable Medication changes following discharge:  -none    Diabetes  She presents for her follow-up diabetic visit. She has type 2 diabetes mellitus. There are no hypoglycemic associated symptoms. There are no hypoglycemic complications. Current diabetic treatment includes oral agent (monotherapy) (and weekly GLP-1).     Current DM Pharmacotherapy:   -Trulicity 1.5mg weekly  -Farxiga 10mg daily    SECONDARY PREVENTION  - Statin? Yes  - ACE-I/ARB? No  - Aspirin? Yes    Current monitoring regimen:   Patient is using: glucometer    SMBG Readings: 117 to 210  Average 150    Any episodes of hypoglycemia? No  Hypoglycemia awareness? No    Pertinent PMH Review:  - PMH of Pancreatitis: No  - PMH/FH of Medullary Thyroid Cancer: No  - PMH of Retinopathy: No  - PMH of Urinary Tract Infections: No    CHF Assessment    Symptom/Staging:  -Most recent ejection fraction: 55-60%  -Weight changes?: No    Guideline-Directed Medical Therapy:  -ARNI: Yes, describe: Entresto 24-26  -Beta Blocker: Yes, describe: sotalol  -MRA: Yes, describe: spironolactone 25mg   -SGLT2i: Yes, describe: farxiga 10mg daily    Secondary Prevention:  -The ASCVD Risk score (Bibiana LOPEZ, et al., 2019) failed to calculate for the following reasons:    The patient has a prior MI or stroke  diagnosis   -Aspirin 81mg? yes  -Statin?: Yes, describe: atorvastatin 80mg nightly  -HTN?: Yes, describe: Entresto 24-26 bid    BP:  92/50 to 122/72  Average 105/65    Objective     LMP  (LMP Unknown)      LAB  Lab Results   Component Value Date    BILITOT 0.6 12/18/2023    CALCIUM 9.0 12/18/2023    CO2 23 12/18/2023     12/18/2023    CREATININE 1.15 (H) 12/18/2023    GLUCOSE 176 (H) 12/18/2023    ALKPHOS 80 12/18/2023    K 3.9 12/18/2023    PROT 7.3 12/18/2023     12/18/2023    AST 33 12/18/2023    ALT 25 12/18/2023    BUN 23 12/18/2023    ANIONGAP 16 12/18/2023    MG 2.15 12/18/2023    PHOS 4.0 07/09/2022    ALBUMIN 4.6 12/18/2023    EGFR 52 (L) 12/18/2023     Lab Results   Component Value Date    TRIG 88 03/10/2023    CHOL 95 03/10/2023    HDL 40.5 03/10/2023     Lab Results   Component Value Date    HGBA1C 7.3 (H) 10/02/2023     Current Outpatient Medications on File Prior to Visit   Medication Sig Dispense Refill    ascorbic acid (Vitamin C) 500 mg tablet Take 1 tablet (500 mg) by mouth once daily.      aspirin 81 mg EC tablet TAKE 1 TABLET BY MOUTH DAILY 90 tablet 3    atorvastatin (Lipitor) 80 mg tablet Take 1 tablet (80 mg) by mouth once daily at bedtime. 90 tablet 3    busPIRone (Buspar) 5 mg tablet Take 3 tablets (15 mg) by mouth 2 times a day as needed (As needed for anxiety). 60 tablet 1    cholecalciferol, vitamin D3, (VITAMIN D3 ORAL) Take by mouth once daily. Unknown dose      cyanocobalamin (Vitamin B-12) 100 mcg tablet Take 1 tablet (100 mcg) by mouth once daily.      dapagliflozin (Farxiga) 10 mg Take 1 tablet (10 mg) by mouth once daily. 90 tablet 2    dulaglutide (Trulicity) 1.5 mg/0.5 mL pen injector injection Inject 1.5 mg under the skin 1 (one) time per week. 2 mL 3    fluticasone (Flonase) 50 mcg/actuation nasal spray Administer 1 spray into affected nostril(s) once daily as needed.      furosemide (Lasix) 40 mg tablet Take 1 tablet (40 mg) by mouth once daily. (Patient taking  differently: Take 1 tablet (40 mg) by mouth if needed.) 20 tablet 3    isosorbide mononitrate ER (Imdur) 60 mg 24 hr tablet Take 1 tablet (60 mg) by mouth once daily.      nitroglycerin (Nitrostat) 0.4 mg SL tablet DISSOLVE 1 TABLET UNDER THE TONGUE AS NEEDED FOR CHEST PAIN-  MAY REPEAT EVERY 5 MINUTES IF NEEDED ( MAX 3 DOSES.- IF NO RELIEF CALL 911) 25 tablet 3    pantoprazole (ProtoNix) 40 mg EC tablet Take 1 tablet (40 mg) by mouth once daily as needed. Do not crush, chew, or split.      sacubitriL-valsartan (Entresto) 24-26 mg tablet Take 1 tablet by mouth 2 times a day.      sotalol (Betapace) 120 mg tablet TAKE 1 (ONE) TABLET BY MOUTH TWICE DAILY 180 tablet 2    spironolactone (Aldactone) 25 mg tablet Take 1 tablet (25 mg) by mouth once daily. 90 tablet 3    [DISCONTINUED] aspirin 81 mg EC tablet TAKE 1 TABLET BY MOUTH DAILY 90 tablet 1    [DISCONTINUED] nitroglycerin (Nitrostat) 0.4 mg SL tablet Place 1 tablet (0.4 mg) under the tongue every 5 minutes if needed for chest pain.       No current facility-administered medications on file prior to visit.     Assessment/Plan   Patient reviewed BG and BP log. BG is still out of goal. Recommend increasing Trulicity at this time to 3mg weekly and will follow up in 3 weeks to review BG log. Patient states she has been very dizzy over the past couple of weeks. Reviewed BP log with patient and she has been hypotensive. Patient recently decreased entresto to 1/2 tab bid per cardiology recommendations. Told patient to continue taking BP and if BP continues to stay low to contact cardiology for recommendations. Will follow up in 3 weeks to review BG and BP log.  Plan:  INCREASE Trulicity to 3 mg weekly  Follow up in 3 weeks to review BG and BP readings    Danae BrushD, [facility]    Verbal consent to manage patient's drug therapy was obtained from the patient. They were informed they may decline to participate or withdraw from participation in pharmacy services at any  time.

## 2024-01-31 ENCOUNTER — PATIENT OUTREACH (OUTPATIENT)
Dept: CARE COORDINATION | Facility: CLINIC | Age: 69
End: 2024-01-31
Payer: COMMERCIAL

## 2024-02-01 ENCOUNTER — OFFICE VISIT (OUTPATIENT)
Dept: SLEEP MEDICINE | Facility: CLINIC | Age: 69
End: 2024-02-01
Payer: COMMERCIAL

## 2024-02-01 VITALS
BODY MASS INDEX: 27.93 KG/M2 | HEIGHT: 66 IN | WEIGHT: 173.8 LBS | SYSTOLIC BLOOD PRESSURE: 91 MMHG | OXYGEN SATURATION: 96 % | DIASTOLIC BLOOD PRESSURE: 61 MMHG | HEART RATE: 78 BPM | RESPIRATION RATE: 16 BRPM

## 2024-02-01 DIAGNOSIS — G47.33 OSA (OBSTRUCTIVE SLEEP APNEA): Primary | ICD-10-CM

## 2024-02-01 PROCEDURE — 1126F AMNT PAIN NOTED NONE PRSNT: CPT | Performed by: GENERAL PRACTICE

## 2024-02-01 PROCEDURE — 3078F DIAST BP <80 MM HG: CPT | Performed by: GENERAL PRACTICE

## 2024-02-01 PROCEDURE — 1160F RVW MEDS BY RX/DR IN RCRD: CPT | Performed by: GENERAL PRACTICE

## 2024-02-01 PROCEDURE — 1159F MED LIST DOCD IN RCRD: CPT | Performed by: GENERAL PRACTICE

## 2024-02-01 PROCEDURE — 99214 OFFICE O/P EST MOD 30 MIN: CPT | Performed by: GENERAL PRACTICE

## 2024-02-01 PROCEDURE — 1036F TOBACCO NON-USER: CPT | Performed by: GENERAL PRACTICE

## 2024-02-01 PROCEDURE — 3074F SYST BP LT 130 MM HG: CPT | Performed by: GENERAL PRACTICE

## 2024-02-01 ASSESSMENT — COLUMBIA-SUICIDE SEVERITY RATING SCALE - C-SSRS
6. HAVE YOU EVER DONE ANYTHING, STARTED TO DO ANYTHING, OR PREPARED TO DO ANYTHING TO END YOUR LIFE?: NO
2. HAVE YOU ACTUALLY HAD ANY THOUGHTS OF KILLING YOURSELF?: NO
1. IN THE PAST MONTH, HAVE YOU WISHED YOU WERE DEAD OR WISHED YOU COULD GO TO SLEEP AND NOT WAKE UP?: NO

## 2024-02-01 ASSESSMENT — PATIENT HEALTH QUESTIONNAIRE - PHQ9
2. FEELING DOWN, DEPRESSED OR HOPELESS: NOT AT ALL
1. LITTLE INTEREST OR PLEASURE IN DOING THINGS: NOT AT ALL
SUM OF ALL RESPONSES TO PHQ9 QUESTIONS 1 AND 2: 0

## 2024-02-01 ASSESSMENT — SLEEP AND FATIGUE QUESTIONNAIRES
HOW LIKELY ARE YOU TO NOD OFF OR FALL ASLEEP WHILE LYING DOWN TO REST IN THE AFTERNOON WHEN CIRCUMSTANCES PERMIT: SLIGHT CHANCE OF DOZING
SITING INACTIVE IN A PUBLIC PLACE LIKE A CLASS ROOM OR A MOVIE THEATER: WOULD NEVER DOZE
HOW LIKELY ARE YOU TO NOD OFF OR FALL ASLEEP WHILE WATCHING TV: SLIGHT CHANCE OF DOZING
HOW LIKELY ARE YOU TO NOD OFF OR FALL ASLEEP IN A CAR, WHILE STOPPED FOR A FEW MINUTES IN TRAFFIC: WOULD NEVER DOZE
HOW LIKELY ARE YOU TO NOD OFF OR FALL ASLEEP WHILE SITTING AND TALKING TO SOMEONE: WOULD NEVER DOZE
HOW LIKELY ARE YOU TO NOD OFF OR FALL ASLEEP WHEN YOU ARE A PASSENGER IN A CAR FOR AN HOUR WITHOUT A BREAK: SLIGHT CHANCE OF DOZING
ESS-CHAD TOTAL SCORE: 4
HOW LIKELY ARE YOU TO NOD OFF OR FALL ASLEEP WHILE SITTING QUIETLY AFTER LUNCH WITHOUT ALCOHOL: WOULD NEVER DOZE
HOW LIKELY ARE YOU TO NOD OFF OR FALL ASLEEP WHILE SITTING AND READING: SLIGHT CHANCE OF DOZING

## 2024-02-01 ASSESSMENT — ENCOUNTER SYMPTOMS
LOSS OF SENSATION IN FEET: 1
OCCASIONAL FEELINGS OF UNSTEADINESS: 1
DEPRESSION: 0

## 2024-02-01 NOTE — PROGRESS NOTES
Patient: Antonia Rodriguez    [id number]  : 1955 -- AGE 69 y.o.    Provider: Amy Frost DO     [facility]   Service Date: 2024              [facility] Sleep Medicine Clinic  Follow up Visit Note        HISTORY OF PRESENT ILLNESS       HISTORY OF PRESENT ILLNESS   Antonia Rodriguez is a 69 y.o. female who presents to a [facility] Sleep Medicine Clinic for a sleep medicine evaluation with concerns of Follow-up.     The patient  has a past medical history of Arterial stent thrombosis, initial encounter (CMS/[facility]), Diabetes (CMS/[facility]), Heart failure (CMS/[facility]), Hypertension, Pacemaker, Personal history of other medical treatment, and Sleep apnea..    PAST SLEEP HISTORY    PSG 2023 --> mild JOSE ARMANDO, RDI 3% 10.7, RDI 4% 1.8, LISA 0. SpO2 katarina 89%.     CURRENT HISTORY    On today's visit, 2024, the patient reports that she would like to discuss her sleep study results.     Sleep schedule  on weekdays / work days:  Usual Bedtime: 10pm  Sleep latency: 15-30min  Wake time : 7am  Total sleep time average/day: 8 hours/day  Awakenings: 0-1x per week, sometimes nocturia, short.   Naps: 1pm, 1hr, daily.     Sleep schedule  on weekends/non work days :  Same as above.  Sleep aids: none     Occupation: retired, used to work as a caregiver/ EKG tech, now does water therapy.     Preferred sleeping position: SLEEP POSITION: prone and sidelying    Sleep-related ROS:    Snoring:  n  Witnessed apneas: n          Am Dry mouth:  occasionally           Nasal congestion:      n   am headaches: n    Sleep is described as refreshing     Fatigue or decreased energy: sometimes   Difficulty remembering things in daytime: n  Difficulty staying focused in daytime: : sometimes   Irritable during the day: n  Drowsy driving: n  Hx of car accident: n  Near-miss Car accident: n      RLS screen:  RLSSCREEN: - Sensations: Patient does not have unusual sensations in their extremities that  cause an urge to move them     Sleep-related behaviors: DENIES    ESS: 4       REVIEW OF SYSTEMS     REVIEW OF SYSTEMS  Review of Systems   All other systems reviewed and are negative.      ALLERGIES AND MEDICATIONS     ALLERGIES  Allergies   Allergen Reactions    Iodinated Contrast Media Hives    Penicillins Hives     Years ago       MEDICATIONS  Current Outpatient Medications   Medication Sig Dispense Refill    ascorbic acid (Vitamin C) 500 mg tablet Take 1 tablet (500 mg) by mouth once daily.      aspirin 81 mg EC tablet TAKE 1 TABLET BY MOUTH DAILY 90 tablet 3    atorvastatin (Lipitor) 80 mg tablet Take 1 tablet (80 mg) by mouth once daily at bedtime. 90 tablet 3    cholecalciferol, vitamin D3, (VITAMIN D3 ORAL) Take by mouth once daily. Unknown dose      cyanocobalamin (Vitamin B-12) 100 mcg tablet Take 1 tablet (100 mcg) by mouth once daily.      dapagliflozin (Farxiga) 10 mg Take 1 tablet (10 mg) by mouth once daily. 90 tablet 2    dulaglutide (Trulicity) 3 mg/0.5 mL pen injector Inject 3 mg under the skin 1 (one) time per week for 28 days. Wearn please mail. Contact Danae with questions 2 mL 1    fluticasone (Flonase) 50 mcg/actuation nasal spray Administer 1 spray into affected nostril(s) once daily as needed.      furosemide (Lasix) 40 mg tablet Take 1 tablet (40 mg) by mouth once daily. (Patient taking differently: Take 1 tablet (40 mg) by mouth if needed.) 20 tablet 3    isosorbide mononitrate ER (Imdur) 60 mg 24 hr tablet Take 1 tablet (60 mg) by mouth once daily.      nitroglycerin (Nitrostat) 0.4 mg SL tablet DISSOLVE 1 TABLET UNDER THE TONGUE AS NEEDED FOR CHEST PAIN-  MAY REPEAT EVERY 5 MINUTES IF NEEDED ( MAX 3 DOSES.- IF NO RELIEF CALL 911) 25 tablet 3    pantoprazole (ProtoNix) 40 mg EC tablet Take 1 tablet (40 mg) by mouth once daily as needed. Do not crush, chew, or split.      sacubitriL-valsartan (Entresto) 24-26 mg tablet Take 1 tablet by mouth 2 times a day.      sotalol (Betapace) 120 mg  "tablet TAKE 1 (ONE) TABLET BY MOUTH TWICE DAILY 180 tablet 2    spironolactone (Aldactone) 25 mg tablet Take 1 tablet (25 mg) by mouth once daily. 90 tablet 3    busPIRone (Buspar) 5 mg tablet Take 3 tablets (15 mg) by mouth 2 times a day as needed (As needed for anxiety). 60 tablet 1     No current facility-administered medications for this visit.         PAST HISTORY     PAST MEDICAL HISTORY  She  has a past medical history of Arterial stent thrombosis, initial encounter (CMS/[facility]), Diabetes (CMS/[facility]), Heart failure (CMS/[facility]), Hypertension, Pacemaker, Personal history of other medical treatment, and Sleep apnea.      PAST SURGICAL HISTORY:  Past Surgical History:   Procedure Laterality Date    CARDIAC CATHETERIZATION N/A 12/18/2023    Procedure: Left Heart Cath;  Surgeon: Juan Boyer MD;  Location: [facility] Cardiac Cath Lab;  Service: Cardiovascular;  Laterality: N/A;    CARDIAC CATHETERIZATION N/A 12/18/2023    Procedure: IFR (Instant Wave Free Ration);  Surgeon: Juan Boyer MD;  Location: [facility] Cardiac Cath Lab;  Service: Cardiovascular;  Laterality: N/A;    OTHER SURGICAL HISTORY  03/03/2022    Cardiac catheterization with stent placement    OTHER SURGICAL HISTORY  07/22/2022    Cardioverter defibrillator insertion       FAMILY HISTORY  Family History   Problem Relation Name Age of Onset    Pancreatic cancer Mother      Lung cancer Father      Diabetes Father           SOCIAL HISTORY  She  reports that she quit smoking about 2 years ago. Her smoking use included cigarettes. She has a 30.00 pack-year smoking history. She has never used smokeless tobacco. She reports current alcohol use. She reports that she does not use drugs.     Caffeine consumption: 2 cups coffee in am  Alcohol consumption: occasional   Smoking: n  Marijuana: n  Other drugs: n      PHYSICAL EXAM     VITAL SIGNS: BP 91/61   Pulse 78   Resp 16   Ht 1.676 m (5' 6\")   Wt 78.8 kg (173 lb 12.8 oz)   LMP  (LMP Unknown)   SpO2 96%   BMI 28.05 " "kg/m²      PREVIOUS WEIGHTS:  Wt Readings from Last 3 Encounters:   02/01/24 78.8 kg (173 lb 12.8 oz)   01/03/24 78 kg (172 lb)   12/18/23 79.1 kg (174 lb 6.1 oz)       Physical Exam  Constitutional: Alert and oriented, cooperative, no obvious distress.   HENT: normocephalic.   Neurologic: AOx3.   psychiatric: appropriate mood and affect.  Integumentary: no significant rashes observed over pap mask area.        RESULTS/DATA     No results found for: \"IRON\", \"TRANSFERRIN\", \"IRONSAT\", \"TIBC\", \"FERRITIN\"            ASSESSMENT/PLAN     Ms. Rodriguez is a 69 y.o. female and  has a past medical history of Arterial stent thrombosis, initial encounter (CMS/[facility]), Diabetes (CMS/[facility]), Heart failure (CMS/[facility]), Hypertension, Pacemaker, Personal history of other medical treatment, and Sleep apnea. She is following up on sleep study results.     Problem List Items Addressed This Visit    None      Problem List and Orders     F with pmhx including HTN, CAD s/p MI and stents, pacemaker/ defibrillator , GERD, DM, paroxysmal SVT, allergic rhinitis, hx heart failure.     1- JOSE ARMANDO AASM criteria   symptoms include snoring, night time awakenings, nocturia--> per patient improved/ resolved.   Denies any more  gasping/ chocking, she thinks this may have been related to anxiety.      PSG 9/11/2023 --> mild JOSE ARMANDO, RDI 3% 10.7, RDI 4% 1.8, LISA 0. SpO2 katarina 89%.     Reviewed and discussed the above sleep study results and management options in details. All questions answered, patient verbalizes understanding.     -patient is not currently interested in PAP therapy mostly due to possible insurance coverage issues since patient does not meet CMS criteria.     -offered to order patient a pap machine but she does not wish to start pap therapy at this point.     -patient may ask friends/ family if they have an extra pap machine that she can use, we can adjust settings for patient at our office if needed; she will let us know if she would like to pursue " this option.     -offered to try repeating sleep study while on back; especially since last sleep study only had limited sleep while in supine position; however, patient does not wish to repeat sleep study at this point; however she will let us know if she would like to be retested in the future.     -patient will do position therapy, she will avoid supine sleep.     -do not drive or operate heavy machinery if drowsy.  -avoid sleeping on your back.   -avoid sedating substances/ medication, alcohol, illicit drugs and tobacco.    2- overweight   counseled on eating a healthy diet.    Follow up as needed.

## 2024-02-01 NOTE — PATIENT INSTRUCTIONS
[facility] Sleep Medicine   [facility]  [address] [facility] [address]  [phone]       NAME: Antonia Rodriguez   DATE: 2/1/2024     Your Sleep Provider Today: Amy Frost DO  Your Primary Care Physician: Nakita Denise, APRN-CNP   Your Referring Provider: No ref. provider found      Thank you for coming to the Sleep Medicine Clinic today! Your sleep medicine provider today was: Amy Frost DO Below is a summary of your treatment plan, other important information, and our contact numbers:      TREATMENT PLAN     Follow up as needed     IMPORTANT INFORMATION     Call 911 for medical emergencies.  Our offices are generally open from Monday-Friday, 9 am - 5 pm.  If you need to get in touch with me, you may either call me and my team(number is below) or you can use [employer].  If a referral for a test, for CPAP, or for another specialist was made, and you have not heard about scheduling this within a week, please call scheduling at [phone]).  If you are unable to make your appointment for clinic or an overnight study, kindly call the office at least 48 hours in advance to cancel and reschedule.  If you are on CPAP, please bring your device's card or the device to each clinic appointment.   There are no supporting services by either the sleep doctors or their staff on weekends and Holidays, or after 5 PM on weekdays.   If you have been asked to come to a sleep study, make sure you bring toiletries, a comfy pillow, and any nighttime medications that you may regularly take. Also be sure to eat dinner before you arrive. We generally do not provide meals.      PRESCRIPTIONS     We require 7 days advanced notice for prescription refills. If we do not receive the request in this time, we cannot guarantee that your medication will be refilled in time.      IMPORTANT PHONE NUMBERS     Sleep Medicine Clinic Fax: [phone]  Appointments (for Pediatric  Sleep Clinic): [phone] - option 1  Appointments (for Adult Sleep Clinic): [phone] - option 2  Appointments (For Sleep Studies): [phone] - option 3  Behavioral Sleep Medicine: [phone]  Sleep Surgery: [phone]  ENT (Otolaryngology): [phone]  Headache Clinic (Neurology): [phone]  Neurology: [phone]  Psychiatry: [phone]  Pulmonary Function Testing (PFT) Center: [phone]  Pulmonary Medicine: [phone]  [employer] (DME): [phone]  [employer] (DME): [phone]  [facility] [place] [phone]      OUR ADULT SLEEP MEDICINE TEAM   Please do not hesitate to call the office or sleep nurse with any questions between appointments:    Adult Sleep Nurses (Coreen Arteaga, RN and Kinsey Degroot RN):  For clinical questions and refilling prescriptions: [phone]  Email sleep diaries and other documents at: [email or web address]    Adult Sleep Medicine Secretaries:  Penelope Munoz (For Ruchi/Chaudhary/Krise/Strohl/Yeh/Light):   P: [phone]  F: [phone]  Emmy Andre (For [place]): P: [phone]  Fax: [phone]  Makenna Padron (For [place]cevic/Blank): P: [phone]  F: [phone]  Annita Ackerman (For [place]): P: [phone]  F: [phone]  Thania Medrano (For Ashlyn/Amaris/Santosh): P: [phone]  F: [phone]  Veronica Villarreal (For Goemz/Anthony): P: [phone]  F: [phone]     Adult Sleep Medicine Advanced Practice Providers:  Popeye Ziegler ([place], [place])  Toya Glez ([facility])  Ritika Akbar CNP ([place], [place], [place])  Norma Dean CNP ([place], [place], [place])  Opal Lozano ([place], [place], [place])  Kj Anthony CNP ([facility])        OUR SLEEP TESTING LOCATIONS     Our team will contact you to schedule your sleep study, however, you can contact us as follow:  Main Phone Line (scheduling only): [phone]),  "option 3  Adult and Pediatric Locations   [place] (6 years and older): [facility] - 4th floor ([address] [facility] [facility]) After hours line: [phone]  [facility] (Main campus: All ages): [facility], 6th floor. After hours line: [phone]   [place] (5 years and older; younger considered on case-by-case basis): [address]; [address], [address]. Scheduling  After hours line: [phone]   [place] (6 years and older): [address]; [address]; [address]   [place] (6 years and older): [address] [facility], [address]  After hours line: [phone]   [place] (13 years and older) in [place]: [address], 2nd floor  After hours line: [phone]   [place] (13 year and older): [address]  After hours line: [phone]     Adult Only Locations:   [place] (18 years and older): [address] [facility], 2nd floor   [place] (18 years and older): [address] [facility]; 4th floor  After hours line: [phone]   [place] (18 years and older) at [place]: [address] [place] [address]  After hours line: [phone]          CONTACTING YOUR SLEEP MEDICINE PROVIDER     Send a message directly to your provider through \"My Chart\", which is the email service through your  Records Account: [email or web address]   Call [phone] and leave a message. One of the administrative assistants will forward the message to your sleep medicine provider through \"My Chart\" and/or email.     Your sleep medicine provider for this visit was: Amy Frost DO        "

## 2024-02-02 ENCOUNTER — TELEPHONE (OUTPATIENT)
Dept: CARDIOLOGY | Facility: CLINIC | Age: 69
End: 2024-02-02
Payer: COMMERCIAL

## 2024-02-02 NOTE — TELEPHONE ENCOUNTER
From Dr. Boyer :     Received a message from the patient stating that there was concern that her blood pressure is too low.    Verónica, can you please call the patient and ask her to decrease her Entresto to half a pill twice a day to see if that helps with any of her dizziness.     I gave her these instructions and she has already been taking 1/2 of Entresto twice a day.      BP running 114/71

## 2024-02-05 ENCOUNTER — HOSPITAL ENCOUNTER (OUTPATIENT)
Dept: CARDIOLOGY | Facility: HOSPITAL | Age: 69
Discharge: HOME | End: 2024-02-05
Payer: COMMERCIAL

## 2024-02-05 DIAGNOSIS — Z95.810 ICD (IMPLANTABLE CARDIOVERTER-DEFIBRILLATOR) IN PLACE: ICD-10-CM

## 2024-02-05 DIAGNOSIS — I25.5 ISCHEMIC CARDIOMYOPATHY: ICD-10-CM

## 2024-02-05 PROCEDURE — 93296 REM INTERROG EVL PM/IDS: CPT

## 2024-02-05 PROCEDURE — 93295 DEV INTERROG REMOTE 1/2/MLT: CPT | Performed by: STUDENT IN AN ORGANIZED HEALTH CARE EDUCATION/TRAINING PROGRAM

## 2024-02-12 ENCOUNTER — TELEPHONE (OUTPATIENT)
Dept: CARDIOLOGY | Facility: CLINIC | Age: 69
End: 2024-02-12
Payer: COMMERCIAL

## 2024-02-12 NOTE — TELEPHONE ENCOUNTER
Patient would like a disabilty Placard having a hard time walking long distances please advise    [phone]

## 2024-02-14 DIAGNOSIS — I50.42 CHRONIC COMBINED SYSTOLIC AND DIASTOLIC HEART FAILURE (MULTI): Primary | ICD-10-CM

## 2024-02-15 ENCOUNTER — TELEMEDICINE (OUTPATIENT)
Dept: PHARMACY | Facility: HOSPITAL | Age: 69
End: 2024-02-15
Payer: COMMERCIAL

## 2024-02-15 DIAGNOSIS — E11.9 CONTROLLED TYPE 2 DIABETES MELLITUS WITHOUT COMPLICATION, WITHOUT LONG-TERM CURRENT USE OF INSULIN (MULTI): ICD-10-CM

## 2024-02-15 RX ORDER — DULAGLUTIDE 4.5 MG/.5ML
4.5 INJECTION, SOLUTION SUBCUTANEOUS
Qty: 2 ML | Refills: 1 | Status: SHIPPED | OUTPATIENT
Start: 2024-02-15 | End: 2024-03-14 | Stop reason: DRUGHIGH

## 2024-02-15 NOTE — PROGRESS NOTES
Pharmacy Post-Discharge Visit  Antonia Rodriguez is a 69 y.o. female was referred to Clinical Pharmacy Team to complete a post-discharge medication optimization and monitoring visit.  The patient was referred for their diabetes, COPD and HF.    Admission Date: 2023  Discharge Date: 2023    Referring Provider: AYE Denise    Subjective   Allergies   Allergen Reactions    Iodinated Contrast Media Hives    Penicillins Hives     Years ago       [facility] - [place], [place] - [address] [facility]  [address] [facility] [address]  Phone: [phone] Fax: [phone]    Social History     Social History Narrative    Not on file     Notable Medication changes following discharge:  -none    Diabetes  She presents for her follow-up diabetic visit. She has type 2 diabetes mellitus. There are no hypoglycemic associated symptoms. There are no hypoglycemic complications. Current diabetic treatment includes oral agent (monotherapy) (and weekly GLP-1).     Current DM Pharmacotherapy:   -Trulicity 1.5mg weekly  -Farxiga 10mg daily    SECONDARY PREVENTION  - Statin? Yes  - ACE-I/ARB? No  - Aspirin? Yes    Current monitoring regimen:   Patient is using: glucometer    B, 140, 130, 141, 140, 140, 144, 140, 142,130, 134    Any episodes of hypoglycemia? No  Hypoglycemia awareness? No    Pertinent PMH Review:  - PMH of Pancreatitis: No  - PMH/FH of Medullary Thyroid Cancer: No  - PMH of Retinopathy: No  - PMH of Urinary Tract Infections: No    CHF Assessment    Symptom/Staging:  -Most recent ejection fraction: 55-60%  -Weight changes?: No    Guideline-Directed Medical Therapy:  -ARNI: Yes, describe: Entresto 24-26  -Beta Blocker: Yes, describe: sotalol  -MRA: Yes, describe: spironolactone 25mg   -SGLT2i: Yes, describe: farxiga 10mg daily    Secondary Prevention:  -The ASCVD Risk score (Bibiana LOPEZ, et al., 2019) failed to calculate for the following reasons:    The patient has a prior MI  or stroke diagnosis   -Aspirin 81mg? yes  -Statin?: Yes, describe: atorvastatin 80mg nightly  -HTN?: Yes, describe: Entresto 24-26 bid    BP: 117/77, 116/70, 116/80, 124/80, 128/79, 109/72, 115/70, 116/77, 117/78, 125/77, 115/75, 94/74, 93/51    Objective     LMP  (LMP Unknown)      LAB  Lab Results   Component Value Date    BILITOT 0.6 12/18/2023    CALCIUM 9.0 12/18/2023    CO2 23 12/18/2023     12/18/2023    CREATININE 1.15 (H) 12/18/2023    GLUCOSE 176 (H) 12/18/2023    ALKPHOS 80 12/18/2023    K 3.9 12/18/2023    PROT 7.3 12/18/2023     12/18/2023    AST 33 12/18/2023    ALT 25 12/18/2023    BUN 23 12/18/2023    ANIONGAP 16 12/18/2023    MG 2.15 12/18/2023    PHOS 4.0 07/09/2022    ALBUMIN 4.6 12/18/2023    EGFR 52 (L) 12/18/2023     Lab Results   Component Value Date    TRIG 88 03/10/2023    CHOL 95 03/10/2023    HDL 40.5 03/10/2023     Lab Results   Component Value Date    HGBA1C 7.3 (H) 10/02/2023     Current Outpatient Medications on File Prior to Visit   Medication Sig Dispense Refill    ascorbic acid (Vitamin C) 500 mg tablet Take 1 tablet (500 mg) by mouth once daily.      aspirin 81 mg EC tablet TAKE 1 TABLET BY MOUTH DAILY 90 tablet 3    atorvastatin (Lipitor) 80 mg tablet Take 1 tablet (80 mg) by mouth once daily at bedtime. 90 tablet 3    busPIRone (Buspar) 5 mg tablet Take 3 tablets (15 mg) by mouth 2 times a day as needed (As needed for anxiety). 60 tablet 1    cholecalciferol, vitamin D3, (VITAMIN D3 ORAL) Take by mouth once daily. Unknown dose      cyanocobalamin (Vitamin B-12) 100 mcg tablet Take 1 tablet (100 mcg) by mouth once daily.      dapagliflozin (Farxiga) 10 mg Take 1 tablet (10 mg) by mouth once daily. 90 tablet 2    dulaglutide (Trulicity) 3 mg/0.5 mL pen injector Inject 3 mg under the skin 1 (one) time per week for 28 days. Wearn please mail. Contact Danae with questions 2 mL 1    fluticasone (Flonase) 50 mcg/actuation nasal spray Administer 1 spray into affected  nostril(s) once daily as needed.      furosemide (Lasix) 40 mg tablet Take 1 tablet (40 mg) by mouth once daily. (Patient taking differently: Take 1 tablet (40 mg) by mouth if needed.) 20 tablet 3    isosorbide mononitrate ER (Imdur) 60 mg 24 hr tablet Take 1 tablet (60 mg) by mouth once daily.      nitroglycerin (Nitrostat) 0.4 mg SL tablet DISSOLVE 1 TABLET UNDER THE TONGUE AS NEEDED FOR CHEST PAIN-  MAY REPEAT EVERY 5 MINUTES IF NEEDED ( MAX 3 DOSES.- IF NO RELIEF CALL 911) 25 tablet 3    pantoprazole (ProtoNix) 40 mg EC tablet Take 1 tablet (40 mg) by mouth once daily as needed. Do not crush, chew, or split.      sacubitriL-valsartan (Entresto) 24-26 mg tablet Take 1 tablet by mouth 2 times a day.      sotalol (Betapace) 120 mg tablet TAKE 1 (ONE) TABLET BY MOUTH TWICE DAILY 180 tablet 2    spironolactone (Aldactone) 25 mg tablet Take 1 tablet (25 mg) by mouth once daily. 90 tablet 3     No current facility-administered medications on file prior to visit.     Assessment/Plan   Patient reviewed BG and BP log. BG is still out of goal. Recommend increasing Trulicity at this time to 4.5 mg weekly and will follow up in 4 weeks to review BG log. Patient states dizziness has subsided since she decreased her Entresto dose to 1/2 tab bid per cardiology recommendations. Reviewed BP log with patient and she is closer to goal now, but still is on the lower side. Will follow up in 4 weeks to review BG and BP log.  Plan:  INCREASE Trulicity to 4.5 mg weekly  Follow up in 4 weeks to review BG and BP readings    Danae Ybarra PharmD, [facility]    Verbal consent to manage patient's drug therapy was obtained from the patient. They were informed they may decline to participate or withdraw from participation in pharmacy services at any time.

## 2024-02-19 ENCOUNTER — HOSPITAL ENCOUNTER (EMERGENCY)
Facility: HOSPITAL | Age: 69
Discharge: HOME | End: 2024-02-19
Attending: STUDENT IN AN ORGANIZED HEALTH CARE EDUCATION/TRAINING PROGRAM
Payer: COMMERCIAL

## 2024-02-19 ENCOUNTER — TELEPHONE (OUTPATIENT)
Dept: PRIMARY CARE | Facility: CLINIC | Age: 69
End: 2024-02-19
Payer: COMMERCIAL

## 2024-02-19 ENCOUNTER — APPOINTMENT (OUTPATIENT)
Dept: CARDIOLOGY | Facility: HOSPITAL | Age: 69
End: 2024-02-19
Payer: COMMERCIAL

## 2024-02-19 VITALS
DIASTOLIC BLOOD PRESSURE: 58 MMHG | BODY MASS INDEX: 27.97 KG/M2 | TEMPERATURE: 97.9 F | HEIGHT: 66 IN | OXYGEN SATURATION: 97 % | HEART RATE: 77 BPM | WEIGHT: 174 LBS | RESPIRATION RATE: 18 BRPM | SYSTOLIC BLOOD PRESSURE: 115 MMHG

## 2024-02-19 DIAGNOSIS — M79.605 LEG PAIN, ANTERIOR, LEFT: Primary | ICD-10-CM

## 2024-02-19 PROCEDURE — 93971 EXTREMITY STUDY: CPT

## 2024-02-19 PROCEDURE — 99284 EMERGENCY DEPT VISIT MOD MDM: CPT | Mod: 25

## 2024-02-19 PROCEDURE — 99284 EMERGENCY DEPT VISIT MOD MDM: CPT | Performed by: STUDENT IN AN ORGANIZED HEALTH CARE EDUCATION/TRAINING PROGRAM

## 2024-02-19 PROCEDURE — 93971 EXTREMITY STUDY: CPT | Performed by: INTERNAL MEDICINE

## 2024-02-19 ASSESSMENT — COLUMBIA-SUICIDE SEVERITY RATING SCALE - C-SSRS
1. IN THE PAST MONTH, HAVE YOU WISHED YOU WERE DEAD OR WISHED YOU COULD GO TO SLEEP AND NOT WAKE UP?: NO
2. HAVE YOU ACTUALLY HAD ANY THOUGHTS OF KILLING YOURSELF?: NO
6. HAVE YOU EVER DONE ANYTHING, STARTED TO DO ANYTHING, OR PREPARED TO DO ANYTHING TO END YOUR LIFE?: NO

## 2024-02-19 ASSESSMENT — LIFESTYLE VARIABLES
EVER HAD A DRINK FIRST THING IN THE MORNING TO STEADY YOUR NERVES TO GET RID OF A HANGOVER: NO
HAVE YOU EVER FELT YOU SHOULD CUT DOWN ON YOUR DRINKING: NO
HAVE PEOPLE ANNOYED YOU BY CRITICIZING YOUR DRINKING: NO
EVER FELT BAD OR GUILTY ABOUT YOUR DRINKING: NO

## 2024-02-19 ASSESSMENT — PAIN SCALES - GENERAL
PAINLEVEL_OUTOF10: 4
PAINLEVEL_OUTOF10: 4

## 2024-02-19 ASSESSMENT — PAIN - FUNCTIONAL ASSESSMENT: PAIN_FUNCTIONAL_ASSESSMENT: 0-10

## 2024-02-19 NOTE — DISCHARGE INSTRUCTIONS
Follow up with your PCP and repeat Ultrasound in one to two weeks to make sure there is no DVT.  Follow up with your PCP for any persistent leg pain or bruising.   Return to the ER for any persistent or worsening symptoms.

## 2024-02-19 NOTE — ED PROVIDER NOTES
HPI   Chief Complaint   Patient presents with    Leg Pain       HPI     69 year old female patient arriving with leg pain , Left side for three days and states that she suddenly noticed L lower leg discoloration and began having a dull pain that improves with tylenol. She says it worsens with palpation and is unchanged with walking. Not on AC. Previously on Brilinta for CAD s/p arterial stents.  She denies trauma.            García Coma Scale Score: 15                     Patient History   Past Medical History:   Diagnosis Date    Arterial stent thrombosis, initial encounter (CMS/Former[facility])     7 stents - pt has defiberilator    Diabetes (CMS/[facility])     Heart failure (CMS/[facility]ly [facility])     Hypertension     Pacemaker     Personal history of other medical treatment     History of echocardiogram    Sleep apnea      Past Surgical History:   Procedure Laterality Date    CARDIAC CATHETERIZATION N/A 2023    Procedure: Left Heart Cath;  Surgeon: Juan Boyer MD;  Location: [facility] Cardiac Cath Lab;  Service: Cardiovascular;  Laterality: N/A;    CARDIAC CATHETERIZATION N/A 2023    Procedure: IFR (Instant Wave Free Ration);  Surgeon: Juan Boyer MD;  Location: [facility] Cardiac Cath Lab;  Service: Cardiovascular;  Laterality: N/A;    OTHER SURGICAL HISTORY  2022    Cardiac catheterization with stent placement    OTHER SURGICAL HISTORY  2022    Cardioverter defibrillator insertion     Family History   Problem Relation Name Age of Onset    Pancreatic cancer Mother      Lung cancer Father      Diabetes Father       Social History     Tobacco Use    Smoking status: Former     Packs/day: 1.00     Years: 30.00     Additional pack years: 0.00     Total pack years: 30.00     Types: Cigarettes     Quit date: 2021     Years since quittin.3    Smokeless tobacco: Never   Substance Use Topics    Alcohol use: Yes     Comment: occasional    Drug use: Never       Physical Exam   ED Triage Vitals [24 1253]   Temperature  Heart Rate Respirations BP   36.6 °C (97.9 °F) 78 18 126/66      Pulse Ox Temp Source Heart Rate Source Patient Position   99 % Temporal Monitor Sitting      BP Location FiO2 (%)     Right arm --       Physical Exam  General: Alert, no acute distress, well developed, Well hydrated  Head: NCAT  Eyes: Clear conjunctiva, EOMI  ENT: Moist mucosa, no lymphadenopathy  Respiratory: Normal respiratory effort. CTAB. Symmetric aeration. No wheezes, rales, or Rhonchi.  CV: Regular rhythm, Normal Rate, pulses present bilaterally  GI: Soft, NT, no guarding, BS normoactive, No distention, No hernia, No palpable mass.  : no flank tenderness, no cva tenderness  MSK: LLE distal ecchymosis and tenderness  Skin: Warm, c/d/i  Neuro: AOx3, facial symmetry,   sensorimotor intact in extremities,   CN intact, Nonfocal neurological exam    ED Course & MDM   Diagnoses as of 02/19/24 1355   Leg pain, anterior, left       Medical Decision Making       69 year old female patient arriving with leg pain , Left sided with lower leg discoloration and dull pain that improves with tylenol, worsens with palpation and is unchanged with walking. She denies trauma.   LLE distal ecchymosis and tenderness noted.  LLE is not overtly cyanotic. Neurovascularly intact. Confirmed distal pulses by US. No DVT seen on US.  Her leg pain is possibly due to a mild traumatic event that she didn't notice. Follow up US in 2 weeks advised to patient to further confirm DVT negative. Patient was given return precautions and discharged in stable condition with PCP follow up advised.    External Records Reviewed: I reviewed recent and relevant outside records including: none  Independent Interpretation of Studies: I independently interpreted: As above  Social Determinants Affecting Care: Diagnostic testing considered: As above    I reviewed the case with the attending ER physician. Patient and/or patient´s representative was counseled regarding labs, imaging, likely  diagnosis, and plan.     Alee Kirby MD MS  PGY-1, Emergency Medicine    The above documentation was completed with the use of speech recognition software. It may contain dictation errors secondary to limitations of the software.        Alee Kirby MD  Resident  02/19/24 [id number]

## 2024-02-19 NOTE — ED NOTES
Discharge instructions reviewed with patient. Stable at discharge.      Veronica Luis RN  02/19/24 [id number]

## 2024-02-27 ENCOUNTER — PATIENT OUTREACH (OUTPATIENT)
Dept: CARE COORDINATION | Facility: CLINIC | Age: 69
End: 2024-02-27
Payer: COMMERCIAL

## 2024-02-27 NOTE — PROGRESS NOTES
Call placed regarding 90 day post discharge follow up call.  At time of outreach call the patient feels as if their condition has improved since initial visit with PCP or specialist. Pt states that she has been doing very well and that she has everything she needs at home.  Questions or concerns regarding recovery period addressed at this time.  Reviewed any PCP or specialists progress notes/labs/radiology reports if applicable and addressed any questions or concerns.  Pt had ER visit 2/19/2024 for LLE blood clot rule out. Pt reports redness, swelling, warmth and pain in left leg has gone away.  Pt reports since switching her trulicity and working with pharmacy her blood sugars have gone down.    -Verified next PCP appt 4/3/2024 1000    Pt denies any questions, needs, or concerns at this time. Pt encouraged to call if questions or needs arise.

## 2024-02-29 ENCOUNTER — TELEPHONE (OUTPATIENT)
Dept: CARDIOLOGY | Facility: CLINIC | Age: 69
End: 2024-02-29
Payer: COMMERCIAL

## 2024-02-29 NOTE — TELEPHONE ENCOUNTER
I talked with Maryjo and gave 's message she expressed understanding and will let us know how she's doing.

## 2024-03-08 ENCOUNTER — TELEPHONE (OUTPATIENT)
Dept: CARDIOLOGY | Facility: CLINIC | Age: 69
End: 2024-03-08
Payer: COMMERCIAL

## 2024-03-08 NOTE — TELEPHONE ENCOUNTER
"Maryjo calls today stating BP has been \"all over the place\" nitroglycerin brings it down.  160/90  Also some chest pain that is relieved by nitroglycerin.  She does note that she has not been dizzy.  She wonders if she should go back on Entresto 24/26 1/2 tablet bid?    "

## 2024-03-14 ENCOUNTER — TELEMEDICINE (OUTPATIENT)
Dept: PHARMACY | Facility: HOSPITAL | Age: 69
End: 2024-03-14
Payer: COMMERCIAL

## 2024-03-14 ENCOUNTER — HOSPITAL ENCOUNTER (OUTPATIENT)
Dept: RADIOLOGY | Facility: CLINIC | Age: 69
Discharge: HOME | End: 2024-03-14
Payer: COMMERCIAL

## 2024-03-14 ENCOUNTER — OFFICE VISIT (OUTPATIENT)
Dept: PRIMARY CARE | Facility: CLINIC | Age: 69
End: 2024-03-14
Payer: COMMERCIAL

## 2024-03-14 VITALS
HEART RATE: 96 BPM | BODY MASS INDEX: 28.28 KG/M2 | SYSTOLIC BLOOD PRESSURE: 94 MMHG | HEIGHT: 66 IN | TEMPERATURE: 97.3 F | WEIGHT: 176 LBS | DIASTOLIC BLOOD PRESSURE: 62 MMHG | RESPIRATION RATE: 20 BRPM

## 2024-03-14 DIAGNOSIS — E11.9 CONTROLLED TYPE 2 DIABETES MELLITUS WITHOUT COMPLICATION, WITHOUT LONG-TERM CURRENT USE OF INSULIN (MULTI): ICD-10-CM

## 2024-03-14 DIAGNOSIS — I95.9 HYPOTENSION, UNSPECIFIED HYPOTENSION TYPE: ICD-10-CM

## 2024-03-14 DIAGNOSIS — I50.42 CHRONIC COMBINED SYSTOLIC AND DIASTOLIC HEART FAILURE (MULTI): ICD-10-CM

## 2024-03-14 DIAGNOSIS — M54.9 COSTOVERTEBRAL ANGLE TENDERNESS: ICD-10-CM

## 2024-03-14 DIAGNOSIS — R39.9 UTI SYMPTOMS: Primary | ICD-10-CM

## 2024-03-14 DIAGNOSIS — I47.29 PAROXYSMAL VENTRICULAR TACHYCARDIA (MULTI): ICD-10-CM

## 2024-03-14 PROBLEM — L97.522 ULCER OF TOE OF LEFT FOOT, WITH FAT LAYER EXPOSED (MULTI): Status: RESOLVED | Noted: 2023-06-09 | Resolved: 2024-03-14

## 2024-03-14 PROBLEM — M25.619 DECREASED RANGE OF MOTION (ROM) OF SHOULDER: Status: RESOLVED | Noted: 2023-12-13 | Resolved: 2024-03-14

## 2024-03-14 PROBLEM — M25.512 LEFT SHOULDER PAIN: Status: RESOLVED | Noted: 2023-02-19 | Resolved: 2024-03-14

## 2024-03-14 PROBLEM — I21.9 MYOCARDIAL INFARCTION (MULTI): Status: RESOLVED | Noted: 2024-01-03 | Resolved: 2024-03-14

## 2024-03-14 PROBLEM — L97.529 ULCER OF TOE OF LEFT FOOT (MULTI): Status: RESOLVED | Noted: 2022-08-02 | Resolved: 2024-03-14

## 2024-03-14 LAB
POC APPEARANCE, URINE: CLEAR
POC BILIRUBIN, URINE: NEGATIVE
POC BLOOD, URINE: NEGATIVE
POC COLOR, URINE: ABNORMAL
POC GLUCOSE, URINE: ABNORMAL MG/DL
POC HEMOGLOBIN A1C: 7.7 % (ref 4.2–6.5)
POC KETONES, URINE: NEGATIVE MG/DL
POC LEUKOCYTES, URINE: NEGATIVE
POC NITRITE,URINE: NEGATIVE
POC PH, URINE: 5.5 PH
POC PROTEIN, URINE: NEGATIVE MG/DL
POC SPECIFIC GRAVITY, URINE: 1.01
POC UROBILINOGEN, URINE: 0.2 EU/DL

## 2024-03-14 PROCEDURE — 1036F TOBACCO NON-USER: CPT | Performed by: NURSE PRACTITIONER

## 2024-03-14 PROCEDURE — 81002 URINALYSIS NONAUTO W/O SCOPE: CPT | Performed by: NURSE PRACTITIONER

## 2024-03-14 PROCEDURE — 76770 US EXAM ABDO BACK WALL COMP: CPT

## 2024-03-14 PROCEDURE — 99214 OFFICE O/P EST MOD 30 MIN: CPT | Performed by: NURSE PRACTITIONER

## 2024-03-14 PROCEDURE — 3078F DIAST BP <80 MM HG: CPT | Performed by: NURSE PRACTITIONER

## 2024-03-14 PROCEDURE — 1160F RVW MEDS BY RX/DR IN RCRD: CPT | Performed by: NURSE PRACTITIONER

## 2024-03-14 PROCEDURE — 1159F MED LIST DOCD IN RCRD: CPT | Performed by: NURSE PRACTITIONER

## 2024-03-14 PROCEDURE — 83036 HEMOGLOBIN GLYCOSYLATED A1C: CPT | Performed by: NURSE PRACTITIONER

## 2024-03-14 PROCEDURE — 87086 URINE CULTURE/COLONY COUNT: CPT

## 2024-03-14 PROCEDURE — 3074F SYST BP LT 130 MM HG: CPT | Performed by: NURSE PRACTITIONER

## 2024-03-14 RX ORDER — DULAGLUTIDE 3 MG/.5ML
3 INJECTION, SOLUTION SUBCUTANEOUS
Qty: 2 ML | Refills: 0 | Status: SHIPPED | OUTPATIENT
Start: 2024-03-14 | End: 2024-04-11 | Stop reason: ALTCHOICE

## 2024-03-14 ASSESSMENT — ENCOUNTER SYMPTOMS
FREQUENCY: 0
SHORTNESS OF BREATH: 0
FLANK PAIN: 1
DYSURIA: 1
CHILLS: 0
BACK PAIN: 1
VOMITING: 0
PALPITATIONS: 0
FEVER: 0
WHEEZING: 0
ABDOMINAL PAIN: 0
NAUSEA: 1

## 2024-03-14 NOTE — ASSESSMENT & PLAN NOTE
POCT A1C 7.7%. Discussed with patient that goal A1C at her age is <8.0 %. Her GI symptoms may not be related to the Trulicity. Will check a renal ultrasound today to r/o kidney stone. Pt. Will also F/U w/ [facility] pharmacist Cass Ybarra this afternoon.

## 2024-03-14 NOTE — PROGRESS NOTES
Subjective   Antonia Rodriguez is a 69 y.o. female who presents for Back Pain (Pt says she is having back/kidney pain on both sides. She has been trying to drink a lot of water and cranberry juice. Started about 5 days ago. Burning on urination and nausea. ).  HPI Pt. Also states she did not take her Trulicity this week because she thinks it's too much. She states she was having GI symptoms w/ N/V with the increased 4 mg dose. She states she had only taken 2 doses at the strength. She states her BS remained 130-170s throughout all of this. She wonders if she should go back to 3 mg of Trulicity.  Review of Systems   Constitutional:  Negative for chills and fever.   Respiratory:  Negative for shortness of breath and wheezing.    Cardiovascular:  Negative for chest pain and palpitations.   Gastrointestinal:  Positive for nausea. Negative for abdominal pain and vomiting.   Genitourinary:  Positive for dysuria and flank pain. Negative for frequency, pelvic pain and urgency.   Musculoskeletal:  Positive for back pain.     Objective   Physical Exam  Vitals reviewed.   Constitutional:       Appearance: Normal appearance.   HENT:      Head: Normocephalic.   Eyes:      Conjunctiva/sclera: Conjunctivae normal.   Cardiovascular:      Rate and Rhythm: Normal rate and regular rhythm.      Pulses: Normal pulses.      Comments: Left chest PPM present  Pulmonary:      Effort: Pulmonary effort is normal.      Breath sounds: Normal breath sounds.   Abdominal:      General: Bowel sounds are normal. There is no distension.      Palpations: Abdomen is soft.      Tenderness: There is no abdominal tenderness. There is right CVA tenderness and left CVA tenderness.   Musculoskeletal:      Cervical back: Neck supple.      Right lower leg: No edema.      Left lower leg: No edema.   Skin:     General: Skin is warm and dry.   Neurological:      General: No focal deficit present.      Mental Status: She is alert and oriented to person, place, and  "time.   Psychiatric:         Mood and Affect: Mood normal.         Thought Content: Thought content normal.     BP 94/62   Pulse 96   Temp 36.3 °C (97.3 °F)   Resp 20   Ht 1.676 m (5' 6\")   Wt 79.8 kg (176 lb)   LMP  (LMP Unknown)   BMI 28.41 kg/m²   Assessment/Plan   Problem List Items Addressed This Visit       Chronic combined systolic and diastolic heart failure (CMS/HCC)    Current Assessment & Plan     Controlled. Will continue current treatment plan.  She has PRN lasix available, but states she has not needed it recently.         Paroxysmal ventricular tachycardia (CMS/HCC)    Current Assessment & Plan     Has an PPM/AICD         Controlled type 2 diabetes mellitus without complication, without long-term current use of insulin (CMS/[facility])    Current Assessment & Plan     POCT A1C 7.7%. Discussed with patient that goal A1C at her age is <8.0 %. Her GI symptoms may not be related to the Trulicity. Will check a renal ultrasound today to r/o kidney stone. Pt. Will also F/U w/ [facility] pharmacist Cass Ybarra this afternoon.         Relevant Orders    POCT glycosylated hemoglobin (Hb A1C) manually resulted (Completed)    Hypotension    Relevant Orders    CBC and Auto Differential    Basic Metabolic Panel     Other Visit Diagnoses       UTI symptoms    -  Primary    Relevant Orders    POCT UA (nonautomated) manually resulted (Completed)    Urine Culture    Costovertebral angle tenderness        Relevant Orders    US renal complete    CBC and Auto Differential    Basic Metabolic Panel                  "

## 2024-03-14 NOTE — ASSESSMENT & PLAN NOTE
Controlled. Will continue current treatment plan.  She has PRN lasix available, but states she has not needed it recently.

## 2024-03-14 NOTE — PROGRESS NOTES
Pharmacy Post-Discharge Visit  Antonia Rodriguez is a 69 y.o. female was referred to Clinical Pharmacy Team to complete a post-discharge medication optimization and monitoring visit.  The patient was referred for their diabetes, COPD and HF.    Admission Date: 12/18/2023  Discharge Date: 12/18/2023    Referring Provider: AYE Denise    Subjective   Allergies   Allergen Reactions    Iodinated Contrast Media Hives    Penicillins Hives     Years ago       [facility] - [place], [place] - [address] [facility]  [address] [facility] [address]  Phone: [phone] Fax: [phone]    Social History     Social History Narrative    Not on file     Notable Medication changes following discharge:  -none    Diabetes  She presents for her follow-up diabetic visit. She has type 2 diabetes mellitus. There are no hypoglycemic associated symptoms. There are no hypoglycemic complications. Current diabetic treatment includes oral agent (monotherapy) (and weekly GLP-1).     Current DM Pharmacotherapy:   -Trulicity 4.5mg weekly  -Farxiga 10mg daily    SECONDARY PREVENTION  - Statin? Yes  - ACE-I/ARB? No  - Aspirin? Yes    Current monitoring regimen:   Patient is using: glucometer    Bg: none to report    Any episodes of hypoglycemia? No  Hypoglycemia awareness? No    Pertinent PMH Review:  - PMH of Pancreatitis: No  - PMH/FH of Medullary Thyroid Cancer: No  - PMH of Retinopathy: No  - PMH of Urinary Tract Infections: No    CHF Assessment    Symptom/Staging:  -Most recent ejection fraction: 55-60%  -Weight changes?: No    Guideline-Directed Medical Therapy:  -ARNI: Yes, describe: Entresto 24-26  -Beta Blocker: Yes, describe: sotalol  -MRA: Yes, describe: spironolactone 25mg   -SGLT2i: Yes, describe: farxiga 10mg daily    Secondary Prevention:  -The ASCVD Risk score (Bibiana LOPEZ, et al., 2019) failed to calculate for the following reasons:    The patient has a prior MI or stroke diagnosis   -Aspirin 81mg?  yes  -Statin?: Yes, describe: atorvastatin 80mg nightly  -HTN?: Yes, describe: Entresto 24-26 bid    BP: none to report    Objective     LMP  (LMP Unknown)      LAB  Lab Results   Component Value Date    BILITOT 0.6 12/18/2023    CALCIUM 9.0 12/18/2023    CO2 23 12/18/2023     12/18/2023    CREATININE 1.15 (H) 12/18/2023    GLUCOSE 176 (H) 12/18/2023    ALKPHOS 80 12/18/2023    K 3.9 12/18/2023    PROT 7.3 12/18/2023     12/18/2023    AST 33 12/18/2023    ALT 25 12/18/2023    BUN 23 12/18/2023    ANIONGAP 16 12/18/2023    MG 2.15 12/18/2023    PHOS 4.0 07/09/2022    ALBUMIN 4.6 12/18/2023    EGFR 52 (L) 12/18/2023     Lab Results   Component Value Date    TRIG 88 03/10/2023    CHOL 95 03/10/2023    HDL 40.5 03/10/2023     Lab Results   Component Value Date    HGBA1C 7.7 (A) 03/14/2024     Current Outpatient Medications on File Prior to Visit   Medication Sig Dispense Refill    ascorbic acid (Vitamin C) 500 mg tablet Take 1 tablet (500 mg) by mouth once daily.      aspirin 81 mg EC tablet TAKE 1 TABLET BY MOUTH DAILY 90 tablet 3    atorvastatin (Lipitor) 80 mg tablet Take 1 tablet (80 mg) by mouth once daily at bedtime. 90 tablet 3    busPIRone (Buspar) 5 mg tablet Take 3 tablets (15 mg) by mouth 2 times a day as needed (As needed for anxiety). 60 tablet 1    cholecalciferol, vitamin D3, (VITAMIN D3 ORAL) Take by mouth once daily. Unknown dose      cyanocobalamin (Vitamin B-12) 100 mcg tablet Take 1 tablet (100 mcg) by mouth once daily.      dapagliflozin (Farxiga) 10 mg Take 1 tablet (10 mg) by mouth once daily. 90 tablet 2    dulaglutide (Trulicity) 4.5 mg/0.5 mL pen injector Inject 4.5 mg under the skin 1 (one) time per week. Wearn please mail. Contact Danae with questions 2 mL 1    fluticasone (Flonase) 50 mcg/actuation nasal spray Administer 1 spray into affected nostril(s) once daily as needed.      furosemide (Lasix) 40 mg tablet Take 1 tablet (40 mg) by mouth once daily. (Patient taking  differently: Take 1 tablet (40 mg) by mouth if needed.) 20 tablet 3    isosorbide mononitrate ER (Imdur) 60 mg 24 hr tablet Take 1 tablet (60 mg) by mouth once daily.      nitroglycerin (Nitrostat) 0.4 mg SL tablet DISSOLVE 1 TABLET UNDER THE TONGUE AS NEEDED FOR CHEST PAIN-  MAY REPEAT EVERY 5 MINUTES IF NEEDED ( MAX 3 DOSES.- IF NO RELIEF CALL 911) 25 tablet 3    pantoprazole (ProtoNix) 40 mg EC tablet Take 1 tablet (40 mg) by mouth once daily as needed. Do not crush, chew, or split.      sacubitriL-valsartan (Entresto) 24-26 mg tablet Take 1 tablet by mouth 2 times a day.      sotalol (Betapace) 120 mg tablet TAKE 1 (ONE) TABLET BY MOUTH TWICE DAILY 180 tablet 2    spironolactone (Aldactone) 25 mg tablet Take 1 tablet (25 mg) by mouth once daily. 90 tablet 3     No current facility-administered medications on file prior to visit.     Assessment/Plan   Patient saw PCP this morning for a possible UTI/ kidney stone. She had increased N/V since starting increased dose of Trulicity. Will decrease Trulicity to 3 mg for 4 weeks while she is sick. Patient's A1C also increased form 7.3% to 7.7% since October. Though she is still in goal, concerned patient had such a drastic increase. Will transition patient to Mounjaro at next visit to help bring A1C into goal.    Plan:  DECREASE Trulicity to 3 mg weekly  Follow up in 4 weeks to review BG and BP readings and transition patient to Kerwin BrushD, [facility]    Verbal consent to manage patient's drug therapy was obtained from the patient. They were informed they may decline to participate or withdraw from participation in pharmacy services at any time.

## 2024-03-15 ENCOUNTER — LAB (OUTPATIENT)
Dept: LAB | Facility: LAB | Age: 69
End: 2024-03-15
Payer: COMMERCIAL

## 2024-03-15 DIAGNOSIS — M54.9 COSTOVERTEBRAL ANGLE TENDERNESS: ICD-10-CM

## 2024-03-15 DIAGNOSIS — I95.9 HYPOTENSION, UNSPECIFIED HYPOTENSION TYPE: ICD-10-CM

## 2024-03-15 LAB
ANION GAP SERPL CALC-SCNC: 12 MMOL/L (ref 10–20)
BACTERIA UR CULT: NORMAL
BASOPHILS # BLD AUTO: 0.04 X10*3/UL (ref 0–0.1)
BASOPHILS NFR BLD AUTO: 0.5 %
BUN SERPL-MCNC: 23 MG/DL (ref 6–23)
CALCIUM SERPL-MCNC: 9.1 MG/DL (ref 8.6–10.3)
CHLORIDE SERPL-SCNC: 107 MMOL/L (ref 98–107)
CO2 SERPL-SCNC: 26 MMOL/L (ref 21–32)
CREAT SERPL-MCNC: 1.07 MG/DL (ref 0.5–1.05)
EGFRCR SERPLBLD CKD-EPI 2021: 56 ML/MIN/1.73M*2
EOSINOPHIL # BLD AUTO: 0.28 X10*3/UL (ref 0–0.7)
EOSINOPHIL NFR BLD AUTO: 3.5 %
ERYTHROCYTE [DISTWIDTH] IN BLOOD BY AUTOMATED COUNT: 14.1 % (ref 11.5–14.5)
GLUCOSE SERPL-MCNC: 290 MG/DL (ref 74–99)
HCT VFR BLD AUTO: 46.9 % (ref 36–46)
HGB BLD-MCNC: 15.3 G/DL (ref 12–16)
IMM GRANULOCYTES # BLD AUTO: 0.02 X10*3/UL (ref 0–0.7)
IMM GRANULOCYTES NFR BLD AUTO: 0.2 % (ref 0–0.9)
LYMPHOCYTES # BLD AUTO: 1.7 X10*3/UL (ref 1.2–4.8)
LYMPHOCYTES NFR BLD AUTO: 21.2 %
MCH RBC QN AUTO: 30.2 PG (ref 26–34)
MCHC RBC AUTO-ENTMCNC: 32.6 G/DL (ref 32–36)
MCV RBC AUTO: 93 FL (ref 80–100)
MONOCYTES # BLD AUTO: 0.66 X10*3/UL (ref 0.1–1)
MONOCYTES NFR BLD AUTO: 8.2 %
NEUTROPHILS # BLD AUTO: 5.33 X10*3/UL (ref 1.2–7.7)
NEUTROPHILS NFR BLD AUTO: 66.4 %
NRBC BLD-RTO: 0 /100 WBCS (ref 0–0)
PLATELET # BLD AUTO: 306 X10*3/UL (ref 150–450)
POTASSIUM SERPL-SCNC: 4.1 MMOL/L (ref 3.5–5.3)
RBC # BLD AUTO: 5.07 X10*6/UL (ref 4–5.2)
SODIUM SERPL-SCNC: 141 MMOL/L (ref 136–145)
WBC # BLD AUTO: 8 X10*3/UL (ref 4.4–11.3)

## 2024-03-15 PROCEDURE — 85025 COMPLETE CBC W/AUTO DIFF WBC: CPT

## 2024-03-15 PROCEDURE — 36415 COLL VENOUS BLD VENIPUNCTURE: CPT

## 2024-03-15 PROCEDURE — 80048 BASIC METABOLIC PNL TOTAL CA: CPT

## 2024-03-16 PROBLEM — N18.30 CONTROLLED TYPE 2 DIABETES MELLITUS WITH STAGE 3 CHRONIC KIDNEY DISEASE, WITHOUT LONG-TERM CURRENT USE OF INSULIN (MULTI): Status: ACTIVE | Noted: 2019-01-03

## 2024-03-16 PROBLEM — E11.22 CONTROLLED TYPE 2 DIABETES MELLITUS WITH STAGE 3 CHRONIC KIDNEY DISEASE, WITHOUT LONG-TERM CURRENT USE OF INSULIN (MULTI): Status: ACTIVE | Noted: 2019-01-03

## 2024-04-03 ENCOUNTER — APPOINTMENT (OUTPATIENT)
Dept: PRIMARY CARE | Facility: CLINIC | Age: 69
End: 2024-04-03
Payer: COMMERCIAL

## 2024-04-04 ENCOUNTER — OFFICE VISIT (OUTPATIENT)
Dept: PRIMARY CARE | Facility: CLINIC | Age: 69
End: 2024-04-04
Payer: COMMERCIAL

## 2024-04-04 ENCOUNTER — PATIENT MESSAGE (OUTPATIENT)
Dept: CARDIOLOGY | Facility: CLINIC | Age: 69
End: 2024-04-04

## 2024-04-04 VITALS
SYSTOLIC BLOOD PRESSURE: 104 MMHG | DIASTOLIC BLOOD PRESSURE: 64 MMHG | WEIGHT: 173 LBS | RESPIRATION RATE: 20 BRPM | TEMPERATURE: 97.2 F | HEART RATE: 88 BPM | HEIGHT: 66 IN | BODY MASS INDEX: 27.8 KG/M2

## 2024-04-04 DIAGNOSIS — Z12.31 ENCOUNTER FOR SCREENING MAMMOGRAM FOR BREAST CANCER: ICD-10-CM

## 2024-04-04 DIAGNOSIS — I10 PRIMARY HYPERTENSION: ICD-10-CM

## 2024-04-04 DIAGNOSIS — I20.89 CHRONIC STABLE ANGINA (CMS-HCC): ICD-10-CM

## 2024-04-04 DIAGNOSIS — I50.42 CHRONIC COMBINED SYSTOLIC AND DIASTOLIC HEART FAILURE (MULTI): ICD-10-CM

## 2024-04-04 DIAGNOSIS — E11.42 DIABETIC POLYNEUROPATHY ASSOCIATED WITH TYPE 2 DIABETES MELLITUS (MULTI): ICD-10-CM

## 2024-04-04 DIAGNOSIS — N18.31 STAGE 3A CHRONIC KIDNEY DISEASE (MULTI): ICD-10-CM

## 2024-04-04 DIAGNOSIS — E11.22 CONTROLLED TYPE 2 DIABETES MELLITUS WITH STAGE 3 CHRONIC KIDNEY DISEASE, WITHOUT LONG-TERM CURRENT USE OF INSULIN (MULTI): ICD-10-CM

## 2024-04-04 DIAGNOSIS — I25.110 CORONARY ARTERY DISEASE INVOLVING NATIVE CORONARY ARTERY OF NATIVE HEART WITH UNSTABLE ANGINA PECTORIS (MULTI): ICD-10-CM

## 2024-04-04 DIAGNOSIS — I25.10 ATHEROSCLEROTIC HEART DISEASE OF NATIVE CORONARY ARTERY WITHOUT ANGINA PECTORIS: ICD-10-CM

## 2024-04-04 DIAGNOSIS — N18.30 CONTROLLED TYPE 2 DIABETES MELLITUS WITH STAGE 3 CHRONIC KIDNEY DISEASE, WITHOUT LONG-TERM CURRENT USE OF INSULIN (MULTI): ICD-10-CM

## 2024-04-04 DIAGNOSIS — Z13.6 SCREENING FOR CARDIOVASCULAR CONDITION: ICD-10-CM

## 2024-04-04 DIAGNOSIS — Z12.12 SCREENING FOR COLORECTAL CANCER: ICD-10-CM

## 2024-04-04 DIAGNOSIS — I25.5 ISCHEMIC CONGESTIVE CARDIOMYOPATHY (MULTI): ICD-10-CM

## 2024-04-04 DIAGNOSIS — K59.00 DIFFICULTY PASSING STOOL: ICD-10-CM

## 2024-04-04 DIAGNOSIS — I42.0 DILATED CARDIOMYOPATHY WITH GENETIC MARKER (MULTI): ICD-10-CM

## 2024-04-04 DIAGNOSIS — I47.0 RE-ENTRY VENTRICULAR ARRHYTHMIA (MULTI): ICD-10-CM

## 2024-04-04 DIAGNOSIS — J44.9 CHRONIC OBSTRUCTIVE PULMONARY DISEASE, UNSPECIFIED COPD TYPE (MULTI): ICD-10-CM

## 2024-04-04 DIAGNOSIS — I47.29 PAROXYSMAL VENTRICULAR TACHYCARDIA (MULTI): ICD-10-CM

## 2024-04-04 DIAGNOSIS — E07.9 THYROID DISORDER: ICD-10-CM

## 2024-04-04 DIAGNOSIS — I42.0 ISCHEMIC CONGESTIVE CARDIOMYOPATHY (MULTI): ICD-10-CM

## 2024-04-04 DIAGNOSIS — R73.9 BLOOD GLUCOSE ELEVATED: ICD-10-CM

## 2024-04-04 DIAGNOSIS — E11.9 ENCOUNTER FOR DIABETIC FOOT EXAM (MULTI): ICD-10-CM

## 2024-04-04 DIAGNOSIS — Z71.89 GOALS OF CARE, COUNSELING/DISCUSSION: ICD-10-CM

## 2024-04-04 DIAGNOSIS — E11.3299 BACKGROUND DIABETIC RETINOPATHY (MULTI): ICD-10-CM

## 2024-04-04 DIAGNOSIS — Z12.11 SCREENING FOR COLORECTAL CANCER: ICD-10-CM

## 2024-04-04 DIAGNOSIS — Z00.00 ROUTINE GENERAL MEDICAL EXAMINATION AT HEALTH CARE FACILITY: Primary | ICD-10-CM

## 2024-04-04 DIAGNOSIS — Z78.0 MENOPAUSE PRESENT: ICD-10-CM

## 2024-04-04 PROBLEM — E66.3 OVERWEIGHT WITH BODY MASS INDEX (BMI) OF 26 TO 26.9 IN ADULT: Status: RESOLVED | Noted: 2023-02-19 | Resolved: 2024-04-04

## 2024-04-04 PROBLEM — G80.9 CEREBRAL PALSY (MULTI): Status: RESOLVED | Noted: 2024-01-03 | Resolved: 2024-04-04

## 2024-04-04 PROBLEM — I20.0 UNSTABLE ANGINA PECTORIS (MULTI): Status: RESOLVED | Noted: 2022-07-07 | Resolved: 2024-04-04

## 2024-04-04 PROBLEM — E66.3 OVERWEIGHT (BMI 25.0-29.9): Status: RESOLVED | Noted: 2023-02-19 | Resolved: 2024-04-04

## 2024-04-04 PROCEDURE — 3074F SYST BP LT 130 MM HG: CPT | Performed by: NURSE PRACTITIONER

## 2024-04-04 PROCEDURE — 1123F ACP DISCUSS/DSCN MKR DOCD: CPT | Performed by: NURSE PRACTITIONER

## 2024-04-04 PROCEDURE — 1170F FXNL STATUS ASSESSED: CPT | Performed by: NURSE PRACTITIONER

## 2024-04-04 PROCEDURE — 3078F DIAST BP <80 MM HG: CPT | Performed by: NURSE PRACTITIONER

## 2024-04-04 PROCEDURE — 1158F ADVNC CARE PLAN TLK DOCD: CPT | Performed by: NURSE PRACTITIONER

## 2024-04-04 PROCEDURE — 1159F MED LIST DOCD IN RCRD: CPT | Performed by: NURSE PRACTITIONER

## 2024-04-04 PROCEDURE — 1036F TOBACCO NON-USER: CPT | Performed by: NURSE PRACTITIONER

## 2024-04-04 PROCEDURE — 1160F RVW MEDS BY RX/DR IN RCRD: CPT | Performed by: NURSE PRACTITIONER

## 2024-04-04 PROCEDURE — G0439 PPPS, SUBSEQ VISIT: HCPCS | Performed by: NURSE PRACTITIONER

## 2024-04-04 RX ORDER — NITROGLYCERIN 0.4 MG/1
TABLET SUBLINGUAL
Qty: 25 TABLET | Refills: 3 | Status: SHIPPED | OUTPATIENT
Start: 2024-04-04

## 2024-04-04 RX ORDER — DOCUSATE SODIUM 100 MG/1
100 CAPSULE, LIQUID FILLED ORAL 2 TIMES DAILY
Qty: 60 CAPSULE | Refills: 5 | Status: SHIPPED | OUTPATIENT
Start: 2024-04-04

## 2024-04-04 ASSESSMENT — ACTIVITIES OF DAILY LIVING (ADL)
GROCERY_SHOPPING: INDEPENDENT
TAKING_MEDICATION: INDEPENDENT
BATHING: INDEPENDENT
DRESSING: INDEPENDENT
DOING_HOUSEWORK: INDEPENDENT
MANAGING_FINANCES: INDEPENDENT

## 2024-04-04 ASSESSMENT — PATIENT HEALTH QUESTIONNAIRE - PHQ9
2. FEELING DOWN, DEPRESSED OR HOPELESS: NOT AT ALL
SUM OF ALL RESPONSES TO PHQ9 QUESTIONS 1 AND 2: 0
1. LITTLE INTEREST OR PLEASURE IN DOING THINGS: NOT AT ALL

## 2024-04-04 ASSESSMENT — ENCOUNTER SYMPTOMS
PALPITATIONS: 0
ABDOMINAL PAIN: 0
WEAKNESS: 0
FEVER: 0
FATIGUE: 0
DIARRHEA: 0
COUGH: 0
CONSTIPATION: 0
SHORTNESS OF BREATH: 1
HEADACHES: 0

## 2024-04-04 NOTE — PROGRESS NOTES
"Subjective   Reason for Visit: Antonia Rodriguez is an 69 y.o. female here for a Medicare Wellness visit.   Past Medical, Surgical, and Family History reviewed and updated in chart.  Reviewed all medications by prescribing practitioner or clinical pharmacist (such as prescriptions, OTCs, herbal therapies and supplements) and documented in the medical record.  HPI Pt. Does not believe in vaccinations for flu, COVID, pneumococcal. She is willing to think about Prevnar 20 since it is a one time only dose. She is not ready to agree to it now.   Patient Care Team:  VENU Denise-CNP as PCP - General (Gerontology)  Ricco Cuevas DO as PCP - [employer] PCP   Review of Systems   Constitutional:  Negative for fatigue and fever.   Respiratory:  Positive for shortness of breath (resolved today, but gets SOB when fluid overloaded like yesterday.). Negative for cough.    Cardiovascular:  Positive for chest pain and leg swelling (resolved currently, but bad yesterday). Negative for palpitations.   Gastrointestinal:  Negative for abdominal pain, constipation and diarrhea.   Neurological:  Negative for weakness and headaches.   Pt. Enjoys water aerobics. She goes 3 days per week.   Objective   Vitals:  /64   Pulse 88   Temp 36.2 °C (97.2 °F)   Resp 20   Ht 1.676 m (5' 6\")   Wt 78.5 kg (173 lb)   LMP  (LMP Unknown)   BMI 27.92 kg/m²     Physical Exam  Vitals reviewed.   Constitutional:       Appearance: Normal appearance.   HENT:      Head: Normocephalic.   Eyes:      Conjunctiva/sclera: Conjunctivae normal.   Cardiovascular:      Rate and Rhythm: Normal rate and regular rhythm.      Pulses: Normal pulses.      Heart sounds: No murmur heard.  Pulmonary:      Breath sounds: Normal breath sounds.   Abdominal:      General: Bowel sounds are normal.      Palpations: Abdomen is soft.   Musculoskeletal:      Cervical back: Neck supple.      Right lower leg: No edema.      Left lower leg: No edema. "   Skin:     General: Skin is warm and dry.   Neurological:      General: No focal deficit present.      Mental Status: She is alert and oriented to person, place, and time.   Psychiatric:         Mood and Affect: Mood normal.         Thought Content: Thought content normal.     Assessment/Plan   Problem List Items Addressed This Visit       Hypertension    Overview     Controlled. Continue medications. Continue to monitor.         Relevant Orders    CBC and Auto Differential    Comprehensive Metabolic Panel    Follow Up In [facility] - PCP - Established    CAD (coronary artery disease)    Overview     EKG ordered given extensive cardiac history and pain in the left shoulder radiating to the neck. EKG relatively unchanged compared to EKG on 2/1/2023. Ventricular paced, HR 79, no ST elevations or depressions. Continue to report cardiac symptoms and follow up as originally scheduled with cardiologist and EP. Go to ED if developing chest pain, shortness of breath, or ICD shocks         Relevant Medications    nitroglycerin (Nitrostat) 0.4 mg SL tablet    Chronic combined systolic and diastolic heart failure (CMS/HCC)    Current Assessment & Plan     States she had taut, shiny skin of lower extremities, shortness of breath with minimal exertion, and subsequent chest pain. She used her PRN 40 mg lasix and had relief of all symptoms. She is down 7-8 pounds since yesterday.   Advised pt to discuss with Dr. Boyer if she might be better off taking a low dose lasix (10 mg) every other day for maintenance.         Relevant Medications    nitroglycerin (Nitrostat) 0.4 mg SL tablet    Other Relevant Orders    Thyroid Stimulating Hormone    Follow Up In [facility] - PCP - Established    Chronic stable angina (CMS/HCC)    Overview     Taking isosorbide 60mg every day. Denies chest pain.         Current Assessment & Plan     States she typically uses PRN SL nitroglycerin twice per week. She did use it twice  yesterday and did have relief after the 2nd dose and a dose of lasix. Refilled nitroglycerin.           Relevant Medications    nitroglycerin (Nitrostat) 0.4 mg SL tablet    Paroxysmal ventricular tachycardia (CMS/HCC)    Relevant Medications    nitroglycerin (Nitrostat) 0.4 mg SL tablet    Diabetic polyneuropathy associated with type 2 diabetes mellitus (CMS/[facility])    Background diabetic retinopathy (CMS/[facility])    Controlled type 2 diabetes mellitus with stage 3 chronic kidney disease, without long-term current use of insulin (CMS/[facility])    Relevant Orders    Hemoglobin A1C    Follow Up In [facility] - PCP - Established    Ischemic congestive cardiomyopathy (CMS/HCC)    Relevant Medications    nitroglycerin (Nitrostat) 0.4 mg SL tablet    Stage 3a chronic kidney disease (CMS/HCC)    Chronic obstructive pulmonary disease (CMS/[facility])    Dilated cardiomyopathy with genetic marker (CMS/[facility])    Relevant Medications    nitroglycerin (Nitrostat) 0.4 mg SL tablet    Re-entry ventricular arrhythmia (CMS/[facility])    Relevant Medications    nitroglycerin (Nitrostat) 0.4 mg SL tablet    Goals of care, counseling/discussion    Current Assessment & Plan     Pt. Remains a full code per her request. She is willing to be intubated and receive chest compressions. However, she states she does not want to be kept alive in that state for a prolonged period of time. She has discussed that with both of her children, and her son, Juan Manuel Rodriguez, is her health agent.          Encounter for diabetic foot exam (CMS/[facility])    Relevant Orders    Follow Up In [facility] - PCP - Established     Other Visit Diagnoses       Routine general medical examination at health care facility    -  Primary    Relevant Orders    1 Year Follow Up In [facility] - PCP - Wellness Exam    Follow Up In [facility] - PCP - Medicare Annual    Atherosclerotic heart disease of native coronary artery without angina pectoris        Relevant  Medications    nitroglycerin (Nitrostat) 0.4 mg SL tablet    Difficulty passing stool        Relevant Medications    docusate sodium (Colace) 100 mg capsule    Screening for cardiovascular condition        Relevant Orders    Lipid panel    Encounter for screening mammogram for breast cancer        Relevant Orders    BI mammo bilateral screening tomosynthesis    Screening for colorectal cancer        Relevant Orders    Cologuard® colon cancer screening    Thyroid disorder        Relevant Orders    Thyroid Stimulating Hormone    Blood glucose elevated        Relevant Orders    Hemoglobin A1C    Menopause present        Relevant Orders    XR DEXA bone density

## 2024-04-04 NOTE — ASSESSMENT & PLAN NOTE
Pt. Remains a full code per her request. She is willing to be intubated and receive chest compressions. However, she states she does not want to be kept alive in that state for a prolonged period of time. She has discussed that with both of her children, and her son, Juan Manuel Rodriguez, is her health agent.

## 2024-04-04 NOTE — ASSESSMENT & PLAN NOTE
States she typically uses PRN SL nitroglycerin twice per week. She did use it twice yesterday and did have relief after the 2nd dose and a dose of lasix. Refilled nitroglycerin.

## 2024-04-04 NOTE — ASSESSMENT & PLAN NOTE
States she had taut, shiny skin of lower extremities, shortness of breath with minimal exertion, and subsequent chest pain. She used her PRN 40 mg lasix and had relief of all symptoms. She is down 7-8 pounds since yesterday.   Advised pt to discuss with Dr. Boyer if she might be better off taking a low dose lasix (10 mg) every other day for maintenance.

## 2024-04-05 ENCOUNTER — TELEPHONE (OUTPATIENT)
Dept: CARDIOLOGY | Facility: HOSPITAL | Age: 69
End: 2024-04-05

## 2024-04-11 ENCOUNTER — TELEMEDICINE (OUTPATIENT)
Dept: PHARMACY | Facility: HOSPITAL | Age: 69
End: 2024-04-11
Payer: COMMERCIAL

## 2024-04-11 DIAGNOSIS — E11.9 CONTROLLED TYPE 2 DIABETES MELLITUS WITHOUT COMPLICATION, WITHOUT LONG-TERM CURRENT USE OF INSULIN (MULTI): ICD-10-CM

## 2024-04-11 PROCEDURE — RXMED WILLOW AMBULATORY MEDICATION CHARGE

## 2024-04-11 RX ORDER — TIRZEPATIDE 5 MG/.5ML
5 INJECTION, SOLUTION SUBCUTANEOUS
Qty: 2 ML | Refills: 0 | Status: SHIPPED | OUTPATIENT
Start: 2024-04-14 | End: 2024-05-02 | Stop reason: SDUPTHER

## 2024-04-11 NOTE — PROGRESS NOTES
"Pharmacy Post-Discharge Visit  Antonia Rodriguez \"Patricia\" is a 69 y.o. female was referred to Clinical Pharmacy Team to complete a post-discharge medication optimization and monitoring visit.  The patient was referred for their diabetes, COPD and HF.    Admission Date: 2023  Discharge Date: 2023    Referring Provider: AYE Denise    Subjective   Allergies   Allergen Reactions    Iodinated Contrast Media Hives    Penicillins Hives     Years ago       [id number] - [place], [place] - [address] [facility]  [address] [facility] [address]  Phone: [phone] Fax: [phone]    Social History     Social History Narrative    Not on file     Notable Medication changes following discharge:  -none    Diabetes  She presents for her follow-up diabetic visit. She has type 2 diabetes mellitus. There are no hypoglycemic associated symptoms. There are no hypoglycemic complications. Current diabetic treatment includes oral agent (monotherapy) (and weekly GLP-1).     Current DM Pharmacotherapy:   -Trulicity 3 mg weekly  -Farxiga 10mg daily    SECONDARY PREVENTION  - Statin? Yes  - ACE-I/ARB? No  - Aspirin? Yes    Current monitoring regimen:   Patient is using: glucometer    B to 190    Any episodes of hypoglycemia? No  Hypoglycemia awareness? No    Pertinent PMH Review:  - PMH of Pancreatitis: No  - PMH/FH of Medullary Thyroid Cancer: No  - PMH of Retinopathy: No  - PMH of Urinary Tract Infections: No    CHF Assessment    Symptom/Staging:  -Most recent ejection fraction: 55-60%  -Weight changes?: No    Guideline-Directed Medical Therapy:  -ARNI: Yes, describe: Entresto 24-26  -Beta Blocker: Yes, describe: sotalol  -MRA: Yes, describe: spironolactone 25mg   -SGLT2i: Yes, describe: farxiga 10mg daily    Secondary Prevention:  -The ASCVD Risk score (Bibiana LOPEZ, et al., 2019) failed to calculate for the following reasons:    The patient has a prior MI or stroke diagnosis   -Aspirin 81mg? " yes  -Statin?: Yes, describe: atorvastatin 80mg nightly  -HTN?: Yes, describe: Entresto 24-26 bid    BP: none to report    Objective     LMP  (LMP Unknown)      LAB  Lab Results   Component Value Date    BILITOT 0.6 12/18/2023    CALCIUM 9.1 03/15/2024    CO2 26 03/15/2024     03/15/2024    CREATININE 1.07 (H) 03/15/2024    GLUCOSE 290 (H) 03/15/2024    ALKPHOS 80 12/18/2023    K 4.1 03/15/2024    PROT 7.3 12/18/2023     03/15/2024    AST 33 12/18/2023    ALT 25 12/18/2023    BUN 23 03/15/2024    ANIONGAP 12 03/15/2024    MG 2.15 12/18/2023    PHOS 4.0 07/09/2022    ALBUMIN 4.6 12/18/2023    EGFR 56 (L) 03/15/2024     Lab Results   Component Value Date    TRIG 88 03/10/2023    CHOL 95 03/10/2023    HDL 40.5 03/10/2023     Lab Results   Component Value Date    HGBA1C 7.7 (A) 03/14/2024     Current Outpatient Medications on File Prior to Visit   Medication Sig Dispense Refill    ascorbic acid (Vitamin C) 500 mg tablet Take 1 tablet (500 mg) by mouth once daily.      aspirin 81 mg EC tablet TAKE 1 TABLET BY MOUTH DAILY 90 tablet 3    atorvastatin (Lipitor) 80 mg tablet Take 1 tablet (80 mg) by mouth once daily at bedtime. 90 tablet 3    busPIRone (Buspar) 5 mg tablet Take 3 tablets (15 mg) by mouth 2 times a day as needed (As needed for anxiety). 60 tablet 1    cholecalciferol, vitamin D3, (VITAMIN D3 ORAL) Take by mouth once daily. Unknown dose      cyanocobalamin (Vitamin B-12) 100 mcg tablet Take 1 tablet (100 mcg) by mouth once daily.      dapagliflozin (Farxiga) 10 mg Take 1 tablet (10 mg) by mouth once daily. 90 tablet 2    docusate sodium (Colace) 100 mg capsule Take 1 capsule (100 mg) by mouth 2 times a day. 60 capsule 5    dulaglutide (Trulicity) 3 mg/0.5 mL pen injector Inject 3 mg under the skin 1 (one) time per week for 28 days. Wearn please mail. Contact Danae with questions 2 mL 0    fluticasone (Flonase) 50 mcg/actuation nasal spray Administer 1 spray into affected nostril(s) once daily as  needed.      furosemide (Lasix) 40 mg tablet Take 1 tablet (40 mg) by mouth once daily. (Patient taking differently: Take 1 tablet (40 mg) by mouth if needed.) 20 tablet 3    isosorbide mononitrate ER (Imdur) 60 mg 24 hr tablet Take 1 tablet (60 mg) by mouth once daily.      nitroglycerin (Nitrostat) 0.4 mg SL tablet DISSOLVE 1 TABLET UNDER THE TONGUE AS NEEDED FOR CHEST PAIN-  MAY REPEAT EVERY 5 MINUTES IF NEEDED ( MAX 3 DOSES.- IF NO RELIEF CALL 911) 25 tablet 3    pantoprazole (ProtoNix) 40 mg EC tablet Take 1 tablet (40 mg) by mouth once daily as needed. Do not crush, chew, or split.      sacubitriL-valsartan (Entresto) 24-26 mg tablet Take 1 tablet by mouth 2 times a day.      sotalol (Betapace) 120 mg tablet TAKE 1 (ONE) TABLET BY MOUTH TWICE DAILY 180 tablet 2    spironolactone (Aldactone) 25 mg tablet Take 1 tablet (25 mg) by mouth once daily. 90 tablet 3     No current facility-administered medications on file prior to visit.     Assessment/Plan   Patient is ready to switch from Trulicity to Mounjaro for better BG control. Will send to [facility] pharmacy for . Will follow up in 3 weeks for dose titration.    Mounjaro Education:    - Counseled patient on Mounjaro MOA, expectations, side effects, duration of therapy, administration, and monitoring parameters.  - Provided detailed dosing and administration counseling to ensure proper technique.   - Reviewed Mounjaro titration schedule, starting with 2.5 mg once weekly to 5 mg, 7.5 mg, 10 mg, 12.5 mg and if tolerated 15 mg.  - Counseled patient on the benefits of GiP, such as cardiovascular risk reduction, glycemic control, and weight loss potential.  - Reviewed storage requirements of Mounjaro when not in use, and when to administer the medication if a dose is missed.  - Advised patient that they may experience improved satiety after meals and portion sizes of meals may be reduced as doses of Mounjaro increase.    Plan:  STOP Trulicity  START Mounjaro 5  mg weekly  Follow up in 3 weeks to review BG    Danae BrushD, [facility]    Verbal consent to manage patient's drug therapy was obtained from the patient. They were informed they may decline to participate or withdraw from participation in pharmacy services at any time.

## 2024-04-12 ENCOUNTER — OFFICE VISIT (OUTPATIENT)
Dept: CARDIOLOGY | Facility: CLINIC | Age: 69
End: 2024-04-12
Payer: COMMERCIAL

## 2024-04-12 VITALS
HEIGHT: 66 IN | RESPIRATION RATE: 18 BRPM | HEART RATE: 68 BPM | OXYGEN SATURATION: 96 % | SYSTOLIC BLOOD PRESSURE: 102 MMHG | WEIGHT: 175 LBS | BODY MASS INDEX: 28.12 KG/M2 | DIASTOLIC BLOOD PRESSURE: 64 MMHG

## 2024-04-12 DIAGNOSIS — R06.02 SHORTNESS OF BREATH: ICD-10-CM

## 2024-04-12 DIAGNOSIS — R07.9 CHEST PAIN, UNSPECIFIED TYPE: Primary | ICD-10-CM

## 2024-04-12 DIAGNOSIS — I25.110 CORONARY ARTERY DISEASE INVOLVING NATIVE CORONARY ARTERY OF NATIVE HEART WITH UNSTABLE ANGINA PECTORIS (MULTI): ICD-10-CM

## 2024-04-12 PROCEDURE — 3074F SYST BP LT 130 MM HG: CPT | Performed by: NURSE PRACTITIONER

## 2024-04-12 PROCEDURE — 3078F DIAST BP <80 MM HG: CPT | Performed by: NURSE PRACTITIONER

## 2024-04-12 PROCEDURE — 99215 OFFICE O/P EST HI 40 MIN: CPT | Performed by: NURSE PRACTITIONER

## 2024-04-12 PROCEDURE — 1160F RVW MEDS BY RX/DR IN RCRD: CPT | Performed by: NURSE PRACTITIONER

## 2024-04-12 PROCEDURE — 1159F MED LIST DOCD IN RCRD: CPT | Performed by: NURSE PRACTITIONER

## 2024-04-12 PROCEDURE — 1036F TOBACCO NON-USER: CPT | Performed by: NURSE PRACTITIONER

## 2024-04-12 PROCEDURE — 93000 ELECTROCARDIOGRAM COMPLETE: CPT | Performed by: NURSE PRACTITIONER

## 2024-04-12 NOTE — PATIENT INSTRUCTIONS
- Lasix 20mg once a day for 3 days. Call me on Tuesday & let me know how you feel & what your weight is.  - nuclear stress test to rule out any further issues with blood flow to the heart or as we call it ischemic disease  - Follow up with Dr. Boyer after to review results    It was my pleasure to meet you. I look forward to being your cardiac Nurse Practitioner. I am a huge believer in communicating with my patients. Please contact me at any time, if anything is not clear to you regarding anything we have discussed, or if new questions occur to you.
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
## 2024-04-14 LAB — NONINV COLON CA DNA+OCC BLD SCRN STL QL: NEGATIVE

## 2024-04-15 ENCOUNTER — PHARMACY VISIT (OUTPATIENT)
Dept: PHARMACY | Facility: CLINIC | Age: 69
End: 2024-04-15
Payer: COMMERCIAL

## 2024-04-16 ENCOUNTER — HOSPITAL ENCOUNTER (OUTPATIENT)
Dept: RADIOLOGY | Facility: CLINIC | Age: 69
Discharge: HOME | End: 2024-04-16
Payer: COMMERCIAL

## 2024-04-16 VITALS — HEIGHT: 66 IN | WEIGHT: 172 LBS | BODY MASS INDEX: 27.64 KG/M2

## 2024-04-16 DIAGNOSIS — Z12.31 ENCOUNTER FOR SCREENING MAMMOGRAM FOR BREAST CANCER: ICD-10-CM

## 2024-04-16 DIAGNOSIS — M85.852 OSTEOPENIA OF NECK OF LEFT FEMUR: Primary | ICD-10-CM

## 2024-04-16 DIAGNOSIS — Z78.0 MENOPAUSE PRESENT: ICD-10-CM

## 2024-04-16 PROCEDURE — 77067 SCR MAMMO BI INCL CAD: CPT | Performed by: RADIOLOGY

## 2024-04-16 PROCEDURE — 77080 DXA BONE DENSITY AXIAL: CPT | Performed by: RADIOLOGY

## 2024-04-16 PROCEDURE — 77063 BREAST TOMOSYNTHESIS BI: CPT | Performed by: RADIOLOGY

## 2024-04-16 PROCEDURE — 77067 SCR MAMMO BI INCL CAD: CPT

## 2024-04-16 PROCEDURE — 77080 DXA BONE DENSITY AXIAL: CPT

## 2024-04-18 DIAGNOSIS — I25.10 ATHEROSCLEROTIC HEART DISEASE OF NATIVE CORONARY ARTERY WITHOUT ANGINA PECTORIS: ICD-10-CM

## 2024-04-19 RX ORDER — ISOSORBIDE MONONITRATE 60 MG/1
60 TABLET, EXTENDED RELEASE ORAL DAILY
Qty: 90 TABLET | Refills: 3 | Status: SHIPPED | OUTPATIENT
Start: 2024-04-19

## 2024-04-30 ENCOUNTER — HOSPITAL ENCOUNTER (OUTPATIENT)
Dept: RADIOLOGY | Facility: HOSPITAL | Age: 69
Discharge: HOME | End: 2024-04-30
Payer: COMMERCIAL

## 2024-04-30 ENCOUNTER — HOSPITAL ENCOUNTER (OUTPATIENT)
Dept: CARDIOLOGY | Facility: HOSPITAL | Age: 69
Discharge: HOME | End: 2024-04-30
Payer: COMMERCIAL

## 2024-04-30 ENCOUNTER — TELEPHONE (OUTPATIENT)
Dept: CARDIOLOGY | Facility: CLINIC | Age: 69
End: 2024-04-30
Payer: COMMERCIAL

## 2024-04-30 DIAGNOSIS — I25.110 CORONARY ARTERY DISEASE INVOLVING NATIVE CORONARY ARTERY OF NATIVE HEART WITH UNSTABLE ANGINA PECTORIS (MULTI): ICD-10-CM

## 2024-04-30 DIAGNOSIS — R07.89 OTHER CHEST PAIN: ICD-10-CM

## 2024-04-30 PROCEDURE — 78452 HT MUSCLE IMAGE SPECT MULT: CPT | Performed by: INTERNAL MEDICINE

## 2024-04-30 PROCEDURE — 78452 HT MUSCLE IMAGE SPECT MULT: CPT

## 2024-04-30 PROCEDURE — 93017 CV STRESS TEST TRACING ONLY: CPT

## 2024-04-30 PROCEDURE — A9502 TC99M TETROFOSMIN: HCPCS | Performed by: NURSE PRACTITIONER

## 2024-04-30 PROCEDURE — 3430000001 HC RX 343 DIAGNOSTIC RADIOPHARMACEUTICALS: Performed by: NURSE PRACTITIONER

## 2024-04-30 RX ADMIN — TETROFOSMIN 9.8 MILLICURIE: 0.23 INJECTION, POWDER, LYOPHILIZED, FOR SOLUTION INTRAVENOUS at 09:03

## 2024-04-30 RX ADMIN — TETROFOSMIN 30 MILLICURIE: 0.23 INJECTION, POWDER, LYOPHILIZED, FOR SOLUTION INTRAVENOUS at 10:00

## 2024-04-30 NOTE — TELEPHONE ENCOUNTER
Pt had stress test this morning and now she has chest discomfort. She wants to know if its safe to take a nitroglycerin?    Thank you

## 2024-05-02 ENCOUNTER — TELEMEDICINE (OUTPATIENT)
Dept: PHARMACY | Facility: HOSPITAL | Age: 69
End: 2024-05-02
Payer: COMMERCIAL

## 2024-05-02 DIAGNOSIS — E11.9 CONTROLLED TYPE 2 DIABETES MELLITUS WITHOUT COMPLICATION, WITHOUT LONG-TERM CURRENT USE OF INSULIN (MULTI): ICD-10-CM

## 2024-05-02 RX ORDER — TIRZEPATIDE 5 MG/.5ML
5 INJECTION, SOLUTION SUBCUTANEOUS
Qty: 2 ML | Refills: 0 | Status: SHIPPED | OUTPATIENT
Start: 2024-05-05 | End: 2024-05-23 | Stop reason: DRUGHIGH

## 2024-05-02 NOTE — PROGRESS NOTES
"Pharmacy Post-Discharge Visit  Antonia Rodriguez \"Patricia\" is a 69 y.o. female was referred to Clinical Pharmacy Team to complete a post-discharge medication optimization and monitoring visit.  The patient was referred for their diabetes, COPD and HF.    Admission Date: 12/18/2023  Discharge Date: 12/18/2023    Referring Provider: AYE Denise    Subjective   Allergies   Allergen Reactions    Iodinated Contrast Media Hives    Penicillins Hives     Years ago       [facility] - [place], [place] - [address]  [address]  [facility] [address]  Phone: [phone] Fax: [phone]    [facility]  [address]  [facility] [address]  Phone: [phone] Fax: [phone]    Social History     Social History Narrative    Not on file     Notable Medication changes following discharge:  -none    Diabetes  She presents for her follow-up diabetic visit. She has type 2 diabetes mellitus. There are no hypoglycemic associated symptoms. There are no hypoglycemic complications. Current diabetic treatment includes oral agent (monotherapy) (and weekly GLP-1).     Current DM Pharmacotherapy:   -Mounjaro 5 mg weekly  -Farxiga 10mg daily    SECONDARY PREVENTION  - Statin? Yes  - ACE-I/ARB? No  - Aspirin? Yes    Current monitoring regimen:   Patient is using: glucometer    Bg: varies wildly    Any episodes of hypoglycemia? No  Hypoglycemia awareness? No    Pertinent PMH Review:  - PMH of Pancreatitis: No  - PMH/FH of Medullary Thyroid Cancer: No  - PMH of Retinopathy: No  - PMH of Urinary Tract Infections: No    CHF Assessment    Symptom/Staging:  -Most recent ejection fraction: 55-60%  -Weight changes?: No    Guideline-Directed Medical Therapy:  -ARNI: Yes, describe: Entresto 24-26  -Beta Blocker: Yes, describe: sotalol  -MRA: Yes, describe: spironolactone 25mg   -SGLT2i: Yes, describe: farxiga 10mg daily    Secondary Prevention:  -The ASCVD Risk score (Bibiana LOPEZ, et al., " 2019) failed to calculate for the following reasons:    The patient has a prior MI or stroke diagnosis   -Aspirin 81mg? yes  -Statin?: Yes, describe: atorvastatin 80mg nightly  -HTN?: Yes, describe: Entresto 24-26 bid    BP: none to report    Objective     LMP  (LMP Unknown)      LAB  Lab Results   Component Value Date    BILITOT 0.6 12/18/2023    CALCIUM 9.1 03/15/2024    CO2 26 03/15/2024     03/15/2024    CREATININE 1.07 (H) 03/15/2024    GLUCOSE 290 (H) 03/15/2024    ALKPHOS 80 12/18/2023    K 4.1 03/15/2024    PROT 7.3 12/18/2023     03/15/2024    AST 33 12/18/2023    ALT 25 12/18/2023    BUN 23 03/15/2024    ANIONGAP 12 03/15/2024    MG 2.15 12/18/2023    PHOS 4.0 07/09/2022    ALBUMIN 4.6 12/18/2023    EGFR 56 (L) 03/15/2024     Lab Results   Component Value Date    TRIG 88 03/10/2023    CHOL 95 03/10/2023    HDL 40.5 03/10/2023     Lab Results   Component Value Date    HGBA1C 7.7 (A) 03/14/2024     Current Outpatient Medications on File Prior to Visit   Medication Sig Dispense Refill    ascorbic acid (Vitamin C) 500 mg tablet Take 1 tablet (500 mg) by mouth once daily.      aspirin 81 mg EC tablet TAKE 1 TABLET BY MOUTH DAILY 90 tablet 3    atorvastatin (Lipitor) 80 mg tablet Take 1 tablet (80 mg) by mouth once daily at bedtime. 90 tablet 3    cholecalciferol, vitamin D3, (VITAMIN D3 ORAL) Take by mouth once daily. Unknown dose      cyanocobalamin (Vitamin B-12) 100 mcg tablet Take 1 tablet (100 mcg) by mouth once daily.      dapagliflozin (Farxiga) 10 mg Take 1 tablet (10 mg) by mouth once daily. 90 tablet 2    docusate sodium (Colace) 100 mg capsule Take 1 capsule (100 mg) by mouth 2 times a day. 60 capsule 5    fluticasone (Flonase) 50 mcg/actuation nasal spray Administer 1 spray into affected nostril(s) once daily as needed.      furosemide (Lasix) 40 mg tablet Take 1 tablet (40 mg) by mouth once daily. (Patient taking differently: Take 1 tablet (40 mg) by mouth if needed.) 20 tablet 3     isosorbide mononitrate ER (Imdur) 60 mg 24 hr tablet TAKE 1 TABLET BY MOUTH DAILY 90 tablet 3    nitroglycerin (Nitrostat) 0.4 mg SL tablet DISSOLVE 1 TABLET UNDER THE TONGUE AS NEEDED FOR CHEST PAIN-  MAY REPEAT EVERY 5 MINUTES IF NEEDED ( MAX 3 DOSES.- IF NO RELIEF CALL 911) 25 tablet 3    pantoprazole (ProtoNix) 40 mg EC tablet Take 1 tablet (40 mg) by mouth once daily as needed. Do not crush, chew, or split.      sacubitriL-valsartan (Entresto) 24-26 mg tablet Take 0.5 tablets by mouth 2 times a day.      sotalol (Betapace) 120 mg tablet TAKE 1 (ONE) TABLET BY MOUTH TWICE DAILY 180 tablet 2    spironolactone (Aldactone) 25 mg tablet Take 1 tablet (25 mg) by mouth once daily. 90 tablet 3    tirzepatide (Mounjaro) 5 mg/0.5 mL pen injector Inject 5 mg under the skin 1 (one) time per week. 2 mL 0     No current facility-administered medications on file prior to visit.     Assessment/Plan   Patient had no issues with switching to Mounjaro. Would increase to 7.5 mg dose, but dose is on back order. Will resend for 5 mg to [facility] to be mailed and will follow up in 3 weeks for dose titration.    Plan:  CONTINUE Mounjaro 5 mg weekly  Follow up in 3 weeks to review BG Danae BrushD, [facility]    Verbal consent to manage patient's drug therapy was obtained from the patient. They were informed they may decline to participate or withdraw from participation in pharmacy services at any time.

## 2024-05-03 PROCEDURE — RXMED WILLOW AMBULATORY MEDICATION CHARGE

## 2024-05-06 DIAGNOSIS — I50.42 CHRONIC COMBINED SYSTOLIC (CONGESTIVE) AND DIASTOLIC (CONGESTIVE) HEART FAILURE (MULTI): ICD-10-CM

## 2024-05-07 ENCOUNTER — PHARMACY VISIT (OUTPATIENT)
Dept: PHARMACY | Facility: CLINIC | Age: 69
End: 2024-05-07
Payer: COMMERCIAL

## 2024-05-08 ENCOUNTER — HOSPITAL ENCOUNTER (OUTPATIENT)
Dept: CARDIOLOGY | Facility: HOSPITAL | Age: 69
Discharge: HOME | End: 2024-05-08
Payer: COMMERCIAL

## 2024-05-08 DIAGNOSIS — Z95.810 ICD (IMPLANTABLE CARDIOVERTER-DEFIBRILLATOR) IN PLACE: ICD-10-CM

## 2024-05-08 DIAGNOSIS — E11.9 TYPE 2 DIABETES MELLITUS WITHOUT COMPLICATION, WITHOUT LONG-TERM CURRENT USE OF INSULIN (MULTI): ICD-10-CM

## 2024-05-08 DIAGNOSIS — I25.5 ISCHEMIC CARDIOMYOPATHY: ICD-10-CM

## 2024-05-08 PROCEDURE — 93295 DEV INTERROG REMOTE 1/2/MLT: CPT | Performed by: STUDENT IN AN ORGANIZED HEALTH CARE EDUCATION/TRAINING PROGRAM

## 2024-05-08 PROCEDURE — 93296 REM INTERROG EVL PM/IDS: CPT

## 2024-05-08 RX ORDER — SACUBITRIL AND VALSARTAN 24; 26 MG/1; MG/1
1 TABLET, FILM COATED ORAL 2 TIMES DAILY
Qty: 60 TABLET | Refills: 11 | Status: SHIPPED | OUTPATIENT
Start: 2024-05-08 | End: 2024-05-30 | Stop reason: WASHOUT

## 2024-05-08 RX ORDER — DAPAGLIFLOZIN 10 MG/1
10 TABLET, FILM COATED ORAL DAILY
Qty: 90 TABLET | Refills: 3 | Status: SHIPPED | OUTPATIENT
Start: 2024-05-08

## 2024-05-21 ENCOUNTER — TELEPHONE (OUTPATIENT)
Dept: CARDIOLOGY | Facility: CLINIC | Age: 69
End: 2024-05-21
Payer: COMMERCIAL

## 2024-05-21 NOTE — TELEPHONE ENCOUNTER
Patricia called this morning; she stated her BP is very low at 78/54. She is dizzy and SOB. She did take all of her medications this morning and is scheduled to see you next week but would like to know what she do in the mean time. She would prefer to not see Margarita again. Please advise.

## 2024-05-22 ENCOUNTER — TELEPHONE (OUTPATIENT)
Dept: CARDIOLOGY | Facility: CLINIC | Age: 69
End: 2024-05-22
Payer: COMMERCIAL

## 2024-05-22 NOTE — TELEPHONE ENCOUNTER
Pt is checked her BP this morning and it was 114/75. She is feeling better. She was a little lightheaded this morning. She's wondering if she should just stay off the Enstresto for a little while.

## 2024-05-23 ENCOUNTER — TELEMEDICINE (OUTPATIENT)
Dept: PHARMACY | Facility: HOSPITAL | Age: 69
End: 2024-05-23
Payer: COMMERCIAL

## 2024-05-23 DIAGNOSIS — E11.9 CONTROLLED TYPE 2 DIABETES MELLITUS WITHOUT COMPLICATION, WITHOUT LONG-TERM CURRENT USE OF INSULIN (MULTI): ICD-10-CM

## 2024-05-23 PROCEDURE — RXMED WILLOW AMBULATORY MEDICATION CHARGE

## 2024-05-23 RX ORDER — TIRZEPATIDE 7.5 MG/.5ML
7.5 INJECTION, SOLUTION SUBCUTANEOUS
Qty: 2 ML | Refills: 0 | Status: SHIPPED | OUTPATIENT
Start: 2024-05-26

## 2024-05-23 NOTE — PROGRESS NOTES
"Pharmacy Post-Discharge Visit  Antonia Rodriguez \"Patricia\" is a 69 y.o. female was referred to Clinical Pharmacy Team to complete a post-discharge medication optimization and monitoring visit.  The patient was referred for their diabetes, COPD and HF.    Admission Date: 12/18/2023  Discharge Date: 12/18/2023    Referring Provider: AYE Denise    Subjective   Allergies   Allergen Reactions    Iodinated Contrast Media Hives    Penicillins Hives     Years ago       [facility] - [place], [place] - [address]  [address]  [facility] [address]  Phone: [phone] Fax: [phone]    [facility]  [address]  [facility] [address]  Phone: [phone] Fax: [phone]    Social History     Social History Narrative    Not on file     Notable Medication changes following discharge:  -none    Diabetes  She presents for her follow-up diabetic visit. She has type 2 diabetes mellitus. There are no hypoglycemic associated symptoms. There are no hypoglycemic complications. Current diabetic treatment includes oral agent (monotherapy) (and weekly GLP-1).     Current DM Pharmacotherapy:   -Mounjaro 5 mg weekly  -Farxiga 10mg daily    SECONDARY PREVENTION  - Statin? Yes  - ACE-I/ARB? No  - Aspirin? Yes    Current monitoring regimen:   Patient is using: glucometer    Bg: varies wildly    Any episodes of hypoglycemia? No  Hypoglycemia awareness? No    Pertinent PMH Review:  - PMH of Pancreatitis: No  - PMH/FH of Medullary Thyroid Cancer: No  - PMH of Retinopathy: No  - PMH of Urinary Tract Infections: No    CHF Assessment    Symptom/Staging:  -Most recent ejection fraction: 55-60%  -Weight changes?: No    Guideline-Directed Medical Therapy:  -ARNI: Yes, describe: Entresto 24-26  -Beta Blocker: Yes, describe: sotalol  -MRA: Yes, describe: spironolactone 25mg   -SGLT2i: Yes, describe: farxiga 10mg daily    Secondary Prevention:  -The ASCVD Risk score (Bibiana LPOEZ, et al., " 2019) failed to calculate for the following reasons:    The patient has a prior MI or stroke diagnosis   -Aspirin 81mg? yes  -Statin?: Yes, describe: atorvastatin 80mg nightly  -HTN?: Yes, describe: Entresto 24-26 bid    BP: none to report    Objective     LMP  (LMP Unknown)      LAB  Lab Results   Component Value Date    BILITOT 0.6 12/18/2023    CALCIUM 9.1 03/15/2024    CO2 26 03/15/2024     03/15/2024    CREATININE 1.07 (H) 03/15/2024    GLUCOSE 290 (H) 03/15/2024    ALKPHOS 80 12/18/2023    K 4.1 03/15/2024    PROT 7.3 12/18/2023     03/15/2024    AST 33 12/18/2023    ALT 25 12/18/2023    BUN 23 03/15/2024    ANIONGAP 12 03/15/2024    MG 2.15 12/18/2023    PHOS 4.0 07/09/2022    ALBUMIN 4.6 12/18/2023    EGFR 56 (L) 03/15/2024     Lab Results   Component Value Date    TRIG 88 03/10/2023    CHOL 95 03/10/2023    HDL 40.5 03/10/2023     Lab Results   Component Value Date    HGBA1C 7.7 (A) 03/14/2024     Current Outpatient Medications on File Prior to Visit   Medication Sig Dispense Refill    ascorbic acid (Vitamin C) 500 mg tablet Take 1 tablet (500 mg) by mouth once daily.      aspirin 81 mg EC tablet TAKE 1 TABLET BY MOUTH DAILY 90 tablet 3    atorvastatin (Lipitor) 80 mg tablet Take 1 tablet (80 mg) by mouth once daily at bedtime. 90 tablet 3    cholecalciferol, vitamin D3, (VITAMIN D3 ORAL) Take by mouth once daily. Unknown dose      cyanocobalamin (Vitamin B-12) 100 mcg tablet Take 1 tablet (100 mcg) by mouth once daily.      dapagliflozin propanediol (Farxiga) 10 mg Take 1 tablet (10 mg) by mouth once daily. 90 tablet 3    docusate sodium (Colace) 100 mg capsule Take 1 capsule (100 mg) by mouth 2 times a day. 60 capsule 5    Entresto 24-26 mg tablet TAKE 1 TABLET BY MOUTH TWICE DAILY 60 tablet 11    fluticasone (Flonase) 50 mcg/actuation nasal spray Administer 1 spray into affected nostril(s) once daily as needed.      furosemide (Lasix) 40 mg tablet Take 1 tablet (40 mg) by mouth once daily.  (Patient taking differently: Take 1 tablet (40 mg) by mouth if needed.) 20 tablet 3    isosorbide mononitrate ER (Imdur) 60 mg 24 hr tablet TAKE 1 TABLET BY MOUTH DAILY 90 tablet 3    nitroglycerin (Nitrostat) 0.4 mg SL tablet DISSOLVE 1 TABLET UNDER THE TONGUE AS NEEDED FOR CHEST PAIN-  MAY REPEAT EVERY 5 MINUTES IF NEEDED ( MAX 3 DOSES.- IF NO RELIEF CALL 911) 25 tablet 3    pantoprazole (ProtoNix) 40 mg EC tablet Take 1 tablet (40 mg) by mouth once daily as needed. Do not crush, chew, or split.      sotalol (Betapace) 120 mg tablet TAKE 1 (ONE) TABLET BY MOUTH TWICE DAILY 180 tablet 2    spironolactone (Aldactone) 25 mg tablet Take 1 tablet (25 mg) by mouth once daily. 90 tablet 3    tirzepatide (Mounjaro) 5 mg/0.5 mL pen injector Inject 5 mg under the skin 1 (one) time per week. 2 mL 0     No current facility-administered medications on file prior to visit.     Assessment/Plan   Mounjaro 7.5 mg is back in stock. Patient said BG has varied from 140 to 225 in the morning. Will increase to 7.5 mg dose, and follow up in 4 weeks for dose titration.    Plan:  CONTINUE Mounjaro 7.5 mg weekly  Follow up in 4 weeks to review BG    Danae Ybarra PharmD, [facility]    Verbal consent to manage patient's drug therapy was obtained from the patient. They were informed they may decline to participate or withdraw from participation in pharmacy services at any time.

## 2024-05-24 ENCOUNTER — PHARMACY VISIT (OUTPATIENT)
Dept: PHARMACY | Facility: CLINIC | Age: 69
End: 2024-05-24
Payer: COMMERCIAL

## 2024-05-24 NOTE — TELEPHONE ENCOUNTER
Patient called this afternoon; she has held the Entresto since Tuesday night and today her BP is 73/47. She stated sometimes her heart feels heavy and dizziness on and off. Please advise before the long weekend.

## 2024-05-24 NOTE — TELEPHONE ENCOUNTER
I spoke with Maryjo and gave her dr's instructions and she understands.  I made sure she understands that there is a doctor on call over the weekend.

## 2024-05-30 ENCOUNTER — OFFICE VISIT (OUTPATIENT)
Dept: CARDIOLOGY | Facility: CLINIC | Age: 69
End: 2024-05-30
Payer: COMMERCIAL

## 2024-05-30 VITALS
SYSTOLIC BLOOD PRESSURE: 102 MMHG | BODY MASS INDEX: 28.99 KG/M2 | DIASTOLIC BLOOD PRESSURE: 72 MMHG | HEIGHT: 65 IN | HEART RATE: 80 BPM | WEIGHT: 174 LBS

## 2024-05-30 DIAGNOSIS — I25.10 CORONARY ARTERY DISEASE INVOLVING NATIVE CORONARY ARTERY OF NATIVE HEART WITHOUT ANGINA PECTORIS: Primary | ICD-10-CM

## 2024-05-30 DIAGNOSIS — E78.5 DYSLIPIDEMIA: ICD-10-CM

## 2024-05-30 DIAGNOSIS — I47.29 PAROXYSMAL VENTRICULAR TACHYCARDIA (MULTI): ICD-10-CM

## 2024-05-30 PROBLEM — I25.82 CHRONIC TOTAL OCCLUSION OF CORONARY ARTERY: Status: RESOLVED | Noted: 2023-02-19 | Resolved: 2024-05-30

## 2024-05-30 PROBLEM — I42.0 ISCHEMIC CONGESTIVE CARDIOMYOPATHY (MULTI): Status: RESOLVED | Noted: 2023-12-13 | Resolved: 2024-05-30

## 2024-05-30 PROBLEM — I25.5 ISCHEMIC CONGESTIVE CARDIOMYOPATHY (MULTI): Status: RESOLVED | Noted: 2023-12-13 | Resolved: 2024-05-30

## 2024-05-30 PROBLEM — I42.0: Status: RESOLVED | Noted: 2022-07-15 | Resolved: 2024-05-30

## 2024-05-30 PROBLEM — I50.42 CHRONIC COMBINED SYSTOLIC AND DIASTOLIC HEART FAILURE (MULTI): Status: RESOLVED | Noted: 2023-02-19 | Resolved: 2024-05-30

## 2024-05-30 PROCEDURE — 1036F TOBACCO NON-USER: CPT | Performed by: INTERNAL MEDICINE

## 2024-05-30 PROCEDURE — 3078F DIAST BP <80 MM HG: CPT | Performed by: INTERNAL MEDICINE

## 2024-05-30 PROCEDURE — 3074F SYST BP LT 130 MM HG: CPT | Performed by: INTERNAL MEDICINE

## 2024-05-30 PROCEDURE — 1159F MED LIST DOCD IN RCRD: CPT | Performed by: INTERNAL MEDICINE

## 2024-05-30 PROCEDURE — 1160F RVW MEDS BY RX/DR IN RCRD: CPT | Performed by: INTERNAL MEDICINE

## 2024-05-30 PROCEDURE — 99214 OFFICE O/P EST MOD 30 MIN: CPT | Performed by: INTERNAL MEDICINE

## 2024-05-30 NOTE — PROGRESS NOTES
"Chief Complaint:   No chief complaint on file.     History Of Present Illness:    Antonia Rodriguez \"Yohan" is a 69 y.o. female with a history of chronic systolic and diastolic heart failure, status post Medtronic BiV ICD, CAD (PCI to the LAD and circumflex in the setting of new left bundle branch block November 2021 and PCI to the RCA March 2022), dyslipidemia, diabetes, and tobacco use in the past here for hospital follow-up.     Overall she is feeling much better off the Entresto.  She still has intermittent episodes of chest pain about once a week.  These typically occur when she is exercising.  After a little while she will have an odd sensation in her chest.  She will stop, have something to drink, and the symptom will go away.  She then tries to get back to the activity.    She has not had any significant leg swelling recently.    She brings in blood pressure readings.  Her blood pressures have been very well-controlled with systolics usually around 100.    She does notice that her legs get numb if she is walking too long.  If she stops and rests it goes away.    Lexiscan nuclear stress test 4/30/2024: Small inferolateral scar with no obvious ischemia.  EF 54%.    Catheterization 12/18/2023: RCA with 50% in-stent restenosis.  LAD had a linear stenosis just after the previously deployed stent.  iFR was performed in each lesion and did not reveal hemodynamically significant stenosis.     Echocardiogram 3/10/2023: EF 55 to 60%. Abnormal septal motion consistent with RV pacer. Grade 1 diastolic dysfunction.     PCI to the RCA 1/20/2023: Mid RCA stented with Synergy 4 x 8 mm ABIMBOLA. Angioplasty of the ostial RCA as well. Patent LAD and LCx/OM stents.     On 12/27 felt that she got kicked in the chest while laying down. She has not sent a transmission of her ICD to her electrophysiologist at this time. Believes that there may be an automatic transmission coming soon.     Left heart catheterization 7/8/2022: Multiple " previously deployed stents are patent. Moderate in-stent restenosis of the mid to distal RCA. iFR demonstrated no significant stenosis of the in-stent restenosis. Small second diagonal branch with ostial 75% stenosis. Not amenable to PCI.     Echocardiogram 7/8/2022: EF 25 to 30%. Abnormal septal motion consistent with LBBB. Grade 1 diastolic dysfunction. Mild to moderate MR.     PCI to RCA 3/28/2022: Resolute 2.75 x 38 mm and overlapping 3.5 x 38 mm postdilated with 3.5 and 4.0 mm balloons with good angiographic result.     Echocardiogram 11/17/2021: EF 25 to 30%. Grade 1 diastolic dysfunction. Mild to moderate MR.     PCI 11/16/2021: PCI to the LAD and circumflex in the setting of new left bundle branch block. PCI of LAD with resolute [place] 3.0 x 22 mm ABIMBOLA. PCI of OM1 with resolute [place] 2.5 x 30 mm ABIMBOLA. PCI of LCx with resolute [place] 2.5 x 12 mm ABIMBOLA. Occluded RCA with left-to-right collaterals. LVEDP 20 mmHg. No AS.      Past Medical History:  She has a past medical history of Arterial stent thrombosis, initial encounter (CMS-HCC), Decreased range of motion (ROM) of shoulder (12/13/2023), Diabetes (Multi), Heart failure (Multi), Hypertension, Left shoulder pain (02/19/2023), Myocardial infarction (Multi) (01/03/2024), Pacemaker, Personal history of other medical treatment, Sleep apnea, Ulcer of toe of left foot (Multi) (08/02/2022), Ulcer of toe of left foot, with fat layer exposed (Multi) (06/09/2023), and Unstable angina pectoris (Multi) (07/07/2022).    Past Surgical History:  She has a past surgical history that includes Other surgical history (03/03/2022); Other surgical history (07/22/2022); Cardiac catheterization (N/A, 12/18/2023); and Cardiac catheterization (N/A, 12/18/2023).      Social History:  She reports that she quit smoking about 2 years ago. Her smoking use included cigarettes. She started smoking about 32 years ago. She has a 30 pack-year smoking history. She has never used smokeless tobacco. She  "reports current alcohol use. She reports that she does not use drugs.    Family History:  Family History   Problem Relation Name Age of Onset    Pancreatic cancer Mother      Lung cancer Father      Diabetes Father          Allergies:  Iodinated contrast media and Penicillins    Outpatient Medications:  Current Outpatient Medications   Medication Instructions    ascorbic acid (VITAMIN C) 500 mg, oral, Daily    aspirin 81 mg, oral, Daily    atorvastatin (LIPITOR) 80 mg, oral, Nightly    cholecalciferol, vitamin D3, (VITAMIN D3 ORAL) oral, Daily, Unknown dose    cyanocobalamin (VITAMIN B-12) 100 mcg, oral, Daily    dapagliflozin propanediol (FARXIGA) 10 mg, oral, Daily    docusate sodium (COLACE) 100 mg, oral, 2 times daily    Entresto 24-26 mg tablet 1 tablet, oral, 2 times daily    fluticasone (Flonase) 50 mcg/actuation nasal spray 1 spray, nasal, Daily PRN    furosemide (LASIX) 40 mg, oral, Daily    isosorbide mononitrate ER (IMDUR) 60 mg, oral, Daily    Mounjaro 7.5 mg, subcutaneous, Once Weekly    nitroglycerin (Nitrostat) 0.4 mg SL tablet DISSOLVE 1 TABLET UNDER THE TONGUE AS NEEDED FOR CHEST PAIN-  MAY REPEAT EVERY 5 MINUTES IF NEEDED ( MAX 3 DOSES.- IF NO RELIEF CALL 911)    pantoprazole (PROTONIX) 40 mg, oral, Daily PRN, Do not crush, chew, or split.    sotalol (BETAPACE) 120 mg, oral, 2 times daily    spironolactone (ALDACTONE) 25 mg, oral, Daily       Last Recorded Vitals:  Visit Vitals  /72 (BP Location: Right arm, Patient Position: Sitting)   Pulse 80   Ht 1.651 m (5' 5\")   Wt 78.9 kg (174 lb)   LMP  (LMP Unknown)   BMI 28.96 kg/m²   OB Status Postmenopausal   Smoking Status Former   BSA 1.9 m²      LASTWT(3):   Wt Readings from Last 3 Encounters:   05/30/24 78.9 kg (174 lb)   04/16/24 78 kg (172 lb)   04/12/24 79.4 kg (175 lb)       Physical Exam:  In general: alert and in no acute distress.   HEENT: Carotid upstrokes normal with no bruits. JVP is normal.   Pulmonary: Clear to auscultation " bilaterally.  Cardiovascular: S1,S2, regular. No appreciable murmurs, rubs or gallops.   Lower extremities: Warm.  Thready distal pulses. No edema.     Last Labs:  CBC -  Recent Labs     03/15/24  0842 12/18/23  0521 07/12/23  1303   WBC 8.0 6.0 8.2   HGB 15.3 15.5 14.5   HCT 46.9* 47.6* 45.4    251 345   MCV 93 91 93       CMP -  Recent Labs     03/15/24  0842 12/18/23  0521 10/02/23  1037 03/14/23  0415 03/13/23  0602    138 139 CANCELED 137   K 4.1 3.9 4.2 CANCELED 4.0    103 105 CANCELED 106   CO2 26 23 27 CANCELED 23   ANIONGAP 12 16 11 CANCELED 12   BUN 23 23 16 CANCELED 20   CREATININE 1.07* 1.15* 1.05 CANCELED 0.99   EGFR 56* 52* 58*  --   --    MG  --  2.15  --  CANCELED 2.20     Recent Labs     12/18/23  0521 10/02/23  1037 03/09/23  1509   ALBUMIN 4.6 4.2 4.3   ALKPHOS 80 93 80   ALT 25 20 22   AST 33 20 20   BILITOT 0.6 0.6 0.7       LIPID PANEL -   Recent Labs     03/10/23  0855 03/10/23  0657 11/17/21  1435   CHOL 95 CANCELED 176   LDLF 37 CANCELED 112*   HDL 40.5 CANCELED 46.0   TRIG 88 CANCELED 91       Recent Labs     03/14/24  0831 10/02/23  1037 03/10/23  0657 02/01/23  1029 07/07/22  1650 11/19/21  0441   BNP  --   --   --  23 182* 667*   HGBA1C 7.7* 7.3* 7.7*  --   --   --            Assessment/Plan   1) CAD s/p PCI to LAD, OM1, and LCx as well as PCI to the  of the RCA.  Most recently catheterization December 2023 with iFR of the RCA and LAD demonstrated no hemodynamically significant stenoses.  Lexiscan nuclear stress test April 2024 with no obvious ischemia.    2) dyslipidemia: Continue high intensity statin.     3) resolved systolic heart failure: I am suspicious that her relative hypotension is causing her intermittent episodes of odd sensation in her chest.  She is feeling better off the Entresto.  I would like her to stop the spironolactone which may be lowering her blood pressure and potentially diuresing her a little bit.    Asked her to call next week with an  update on how she is feeling.  We can always go back on spironolactone if needed.     4) hypertension: See the comment above.     5) BiV ICD in situ: Medtronic. Follow-up with EP     6) paroxysmal ventricular tachycardia: On sotalol     7) leg numbness: Asked her to call us back and tell us how she is feeling if her blood pressure creeps up.  If she is still having numbness in the feet we can always order PVRs with exercise.    8) follow-up: 6 months or sooner if needed       Juan Boyer MD

## 2024-05-30 NOTE — PATIENT INSTRUCTIONS
Stay off Entresto    Stop the spironolactone    Call in a week or so and let us know:  1) How are you are feeling  2) How is the numbness in the legs    If the numbness continues then we will order a test on the leg arteries

## 2024-06-07 ENCOUNTER — HOSPITAL ENCOUNTER (EMERGENCY)
Facility: HOSPITAL | Age: 69
Discharge: HOME | End: 2024-06-07
Attending: STUDENT IN AN ORGANIZED HEALTH CARE EDUCATION/TRAINING PROGRAM
Payer: COMMERCIAL

## 2024-06-07 ENCOUNTER — APPOINTMENT (OUTPATIENT)
Dept: RADIOLOGY | Facility: HOSPITAL | Age: 69
End: 2024-06-07
Payer: COMMERCIAL

## 2024-06-07 VITALS
BODY MASS INDEX: 28.98 KG/M2 | SYSTOLIC BLOOD PRESSURE: 118 MMHG | HEIGHT: 65 IN | TEMPERATURE: 98.6 F | RESPIRATION RATE: 18 BRPM | OXYGEN SATURATION: 98 % | DIASTOLIC BLOOD PRESSURE: 70 MMHG | HEART RATE: 70 BPM | WEIGHT: 173.94 LBS
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
## 2024-06-07 DIAGNOSIS — R07.81 RIB PAIN: ICD-10-CM

## 2024-06-07 DIAGNOSIS — S20.211A CONTUSION OF RIB ON RIGHT SIDE, INITIAL ENCOUNTER: ICD-10-CM

## 2024-06-07 PROCEDURE — 71250 CT THORAX DX C-: CPT

## 2024-06-07 PROCEDURE — 71101 X-RAY EXAM UNILAT RIBS/CHEST: CPT | Mod: RIGHT SIDE | Performed by: RADIOLOGY

## 2024-06-07 PROCEDURE — 2500000005 HC RX 250 GENERAL PHARMACY W/O HCPCS: Performed by: STUDENT IN AN ORGANIZED HEALTH CARE EDUCATION/TRAINING PROGRAM

## 2024-06-07 PROCEDURE — 74176 CT ABD & PELVIS W/O CONTRAST: CPT

## 2024-06-07 PROCEDURE — 71101 X-RAY EXAM UNILAT RIBS/CHEST: CPT | Mod: RT

## 2024-06-07 PROCEDURE — 99285 EMERGENCY DEPT VISIT HI MDM: CPT | Mod: 25

## 2024-06-07 PROCEDURE — 70450 CT HEAD/BRAIN W/O DYE: CPT

## 2024-06-07 PROCEDURE — 72125 CT NECK SPINE W/O DYE: CPT

## 2024-06-07 RX ORDER — LIDOCAINE 560 MG/1
1 PATCH PERCUTANEOUS; TOPICAL; TRANSDERMAL DAILY
Status: DISCONTINUED | OUTPATIENT
Start: 2024-06-07 | End: 2024-06-07 | Stop reason: HOSPADM

## 2024-06-07 RX ORDER — LIDOCAINE 560 MG/1
1 PATCH PERCUTANEOUS; TOPICAL; TRANSDERMAL DAILY
Qty: 30 PATCH | Refills: 0 | Status: SHIPPED | OUTPATIENT
Start: 2024-06-07 | End: 2024-07-07

## 2024-06-07 RX ADMIN — LIDOCAINE 4% 1 PATCH: 40 PATCH TOPICAL at 11:15

## 2024-06-07 ASSESSMENT — LIFESTYLE VARIABLES
EVER FELT BAD OR GUILTY ABOUT YOUR DRINKING: NO
TOTAL SCORE: 0
HAVE YOU EVER FELT YOU SHOULD CUT DOWN ON YOUR DRINKING: NO
EVER HAD A DRINK FIRST THING IN THE MORNING TO STEADY YOUR NERVES TO GET RID OF A HANGOVER: NO
HAVE PEOPLE ANNOYED YOU BY CRITICIZING YOUR DRINKING: NO

## 2024-06-07 ASSESSMENT — PAIN SCALES - GENERAL
PAINLEVEL_OUTOF10: 4
PAINLEVEL_OUTOF10: 5 - MODERATE PAIN
PAINLEVEL_OUTOF10: 4
PAINLEVEL_OUTOF10: 5 - MODERATE PAIN

## 2024-06-07 ASSESSMENT — COLUMBIA-SUICIDE SEVERITY RATING SCALE - C-SSRS
2. HAVE YOU ACTUALLY HAD ANY THOUGHTS OF KILLING YOURSELF?: NO
6. HAVE YOU EVER DONE ANYTHING, STARTED TO DO ANYTHING, OR PREPARED TO DO ANYTHING TO END YOUR LIFE?: NO
1. IN THE PAST MONTH, HAVE YOU WISHED YOU WERE DEAD OR WISHED YOU COULD GO TO SLEEP AND NOT WAKE UP?: NO

## 2024-06-07 ASSESSMENT — PAIN - FUNCTIONAL ASSESSMENT: PAIN_FUNCTIONAL_ASSESSMENT: 0-10

## 2024-06-07 ASSESSMENT — PAIN DESCRIPTION - PAIN TYPE: TYPE: ACUTE PAIN

## 2024-06-07 ASSESSMENT — PAIN DESCRIPTION - ORIENTATION: ORIENTATION: RIGHT

## 2024-06-07 NOTE — ED NOTES
Pt coming in for a mechanical fall at pool, tripped on her flip flop. Small scrapes on body, hands, toes and wrists in particular from fall. Pt able to walk after, main complaint is rib pain on right side from landing on that side. MD at bedside upon arrival. Pt was previously on thinners but has been off them for a month now. Pt has call light in reach and understands to not get up without calling for assistance. She states she did not hit head or have LOC.     Ying Shah RN  06/07/24 [id number]

## 2024-06-07 NOTE — DISCHARGE INSTRUCTIONS
Follow-up with your primary care provider to discuss your ER visit.  Please use the medications you are prescribed instructed.  If you develop any severe worsening pain, increase shortness of breath, or if you have any other concerns, please return to the ER for further care.

## 2024-06-07 NOTE — ED PROVIDER NOTES
HPI   Chief Complaint   Patient presents with   • Fall       69-year-old female with past medical history of CAD, hypertension, diabetes presented to the ER after fall.  Patient states that she excellently tripped and fell after getting her feet caught up underneath her at aquatherapy.  She states she fell forward.  She did catch herself however did not hit the right side of her chest.  She has complaints of right-sided rib pain.  She did hit her knee but was able to ambulate after the injury.  She denies any LOC and denies hitting her head.      History provided by:  Patient   used: No                        [place] Coma Scale Score: 15                     Patient History   Past Medical History:   Diagnosis Date   • Arterial stent thrombosis, initial encounter (CMS-HCC)     7 stents - pt has defiberilator   • Decreased range of motion (ROM) of shoulder 12/13/2023   • Diabetes (Multi)    • Heart failure (Multi)    • Hypertension    • Left shoulder pain 02/19/2023    Ddx includes adhesive capsulitis. AC joint osteoarthritis rotator cuff tendinitis, biceps tendinitis, and deltoid tendinitis given the various positive physical exam findings. May be related to upper arm usage during cardiac rehab. Limited treatment options given DM and use of blood thinners. Provided AAOS shoulder exercises and stretches. Recommend using of heating pads, following by exercises, f   • Myocardial infarction (Multi) 01/03/2024    Comment on above: STEMI   • Pacemaker    • Personal history of other medical treatment     History of echocardiogram   • Sleep apnea    • Ulcer of toe of left foot (Multi) 08/02/2022   • Ulcer of toe of left foot, with fat layer exposed (Multi) 06/09/2023   • Unstable angina pectoris (Multi) 07/07/2022     Past Surgical History:   Procedure Laterality Date   • CARDIAC CATHETERIZATION N/A 12/18/2023    Procedure: Left Heart Cath;  Surgeon: Juan Boyer MD;  Location: [facility] Cardiac Cath Lab;   Service: Cardiovascular;  Laterality: N/A;   • CARDIAC CATHETERIZATION N/A 2023    Procedure: IFR (Instant Wave Free Ration);  Surgeon: Juan Boyer MD;  Location: [facility] Cardiac Cath Lab;  Service: Cardiovascular;  Laterality: N/A;   • OTHER SURGICAL HISTORY  2022    Cardiac catheterization with stent placement   • OTHER SURGICAL HISTORY  2022    Cardioverter defibrillator insertion     Family History   Problem Relation Name Age of Onset   • Pancreatic cancer Mother     • Lung cancer Father     • Diabetes Father       Social History     Tobacco Use   • Smoking status: Former     Current packs/day: 0.00     Average packs/day: 1 pack/day for 30.0 years (30.0 ttl pk-yrs)     Types: Cigarettes     Start date: 1991     Quit date: 2021     Years since quittin.6   • Smokeless tobacco: Never   Vaping Use   • Vaping status: Never Used   Substance Use Topics   • Alcohol use: Yes     Comment: occasional   • Drug use: Never       Physical Exam   ED Triage Vitals [24 1058]   Temperature Heart Rate Respirations BP   36.8 °C (98.2 °F) 82 16 152/87      Pulse Ox Temp Source Heart Rate Source Patient Position   98 % Temporal -- --      BP Location FiO2 (%)     -- --       Physical Exam  Vitals and nursing note reviewed.   HENT:      Head: Normocephalic.      Right Ear: External ear normal.      Left Ear: External ear normal.      Nose: Nose normal.      Mouth/Throat:      Mouth: Mucous membranes are moist.   Eyes:      Extraocular Movements: Extraocular movements intact.      Conjunctiva/sclera: Conjunctivae normal.      Pupils: Pupils are equal, round, and reactive to light.   Cardiovascular:      Rate and Rhythm: Normal rate and regular rhythm.   Pulmonary:      Effort: Pulmonary effort is normal.      Breath sounds: No wheezing or rhonchi.   Abdominal:      General: Abdomen is flat. There is no distension.      Tenderness: There is no abdominal tenderness.   Musculoskeletal:         General:  Tenderness (Tenderness to right ribs) present. No signs of injury.      Comments: No crepitus on chest wall   Skin:     General: Skin is warm.      Capillary Refill: Capillary refill takes less than 2 seconds.      Findings: Lesion (Abrasion to left knee) present.   Neurological:      General: No focal deficit present.      Mental Status: She is alert. Mental status is at baseline.   Psychiatric:         Mood and Affect: Mood normal.         ED Course & MDM   ED Course as of 06/08/24 1640 Fri Jun 07, 2024   1219 69-year-old female presented the emergency department today due to concern for mechanical fall.  On arrival, patient was in no acute distress.  Vital stable.  She did have complaint of right-sided rib pain.  She did have scraped her lower chin concerning for a secondary.  Given this, did you order x-ray imaging of ribs and also added CT imaging of head as well as dry imaging of CT chest abdomen pelvis without IV contrast.  Lidoderm patch was ordered for pain. [MJ]   1455 CT head wo IV contrast  IMPRESSION:  No evidence of acute cortical infarct or intracranial hemorrhage.      No evidence of intracranial hemorrhage or displaced skull fracture.   [MJ]   1455 CT cervical spine wo IV contrast  IMPRESSION:  No evidence for an acute fracture or subluxation of the cervical  spine.      Degenerative changes which are most severe at C5-6 and C6-7      Degenerative foraminal stenoses, most severe at C3-4 and C4-5 levels  on the left   [MJ]   1515 CT chest abdomen pelvis wo IV contrast  IMPRESSION:  Chest  1.  No acute intrathoracic abnormality is noted.      Abdomen-Pelvis  1.  No acute intra-abdominal or intrapelvic abnormality is noted.  2. 1.6 cm right adrenal nodule is unchanged from a chest CT in 2021  consistent with a benign adenoma  3. Old compression deformity of L1   [MJ]   1515 Discussed imaging results with patient.  Printout of CT imaging of chest abdomen pelvis with incidental finding given to  patient.  Do feel comfortable discharging patient home.  Lidoderm patch prescribed to patient.  She was given strict return precautions and instructed to follow-up with her primary care provider.  Patient was understanding and agreeable with plan for discharge. [MJ]      ED Course User Index  [MJ] Jp Mason DO         Diagnoses as of 06/08/24 1640   Fall, initial encounter   Rib pain   Contusion of rib on right side, initial encounter       Medical Decision Making      Procedure  Procedures     Herman Graves MD  06/08/24 1640

## 2024-06-10 ENCOUNTER — TELEPHONE (OUTPATIENT)
Dept: CARDIOLOGY | Facility: CLINIC | Age: 69
End: 2024-06-10
Payer: COMMERCIAL

## 2024-06-10 NOTE — TELEPHONE ENCOUNTER
"Mayrjo calls to report that her BP has been good at 121/80    124/90.  No dizziness or lightheadedness.  \"Feels great\"  vision cleared up.    Fell at pool Friday, very sore chest.  To ER x-rays and CT  done  "

## 2024-06-11 ENCOUNTER — TELEPHONE (OUTPATIENT)
Dept: PRIMARY CARE | Facility: CLINIC | Age: 69
End: 2024-06-11
Payer: COMMERCIAL

## 2024-06-11 NOTE — TELEPHONE ENCOUNTER
"Justina MACHADO for documentation...    PT left a message on 24 stating that she fell al the pool and wanted to know if she needed to come in.  PT was speaking so fast  and was mumbling- I had to listed to the message 8-10 times before I could figure out her name, phone number (no ).  When I returned her call she sounded normal, she informed me that someone had called an ambulance and she was going to [facility].  I told the PT that we would talk next week to set up her hospital follow up appointment.  I also mentioned in a pleasant voice- for future reference to please speak clearly and slower so I could get back to her faster.  PT was agreeable and not agitated in any way.  PT left a message 6/10/24 requesting a hospital follow up.  PT left her name, number and  in am EXTREMELY slow manner. After that information PT started speaking in regular fashion and said \"Is that slow enough for you? I don't appreciate being yelled at while I am in pain.\"  I told PT at no time did I yell at her.  I simply wanted her to know her message was very difficult to understand and I could have gotten back to her sooner if I could understand the message.  I offered her an appointment for 24 at 9:40,  which she took and then said she would see me 24 at 9:40 and proceeded to hang up on me. I called the PT back to confirm she was coming in on 24 at 9:40. However she didn't answer and I left a message.  "

## 2024-06-12 ENCOUNTER — OFFICE VISIT (OUTPATIENT)
Dept: PRIMARY CARE | Facility: CLINIC | Age: 69
End: 2024-06-12
Payer: COMMERCIAL

## 2024-06-12 VITALS
OXYGEN SATURATION: 96 % | BODY MASS INDEX: 27.97 KG/M2 | TEMPERATURE: 98.3 F | HEART RATE: 80 BPM | WEIGHT: 174 LBS | HEIGHT: 66 IN | DIASTOLIC BLOOD PRESSURE: 86 MMHG | RESPIRATION RATE: 19 BRPM | SYSTOLIC BLOOD PRESSURE: 144 MMHG

## 2024-06-12 DIAGNOSIS — R07.81 RIB PAIN ON RIGHT SIDE: ICD-10-CM
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
## 2024-06-12 DIAGNOSIS — E27.8 ADRENAL NODULE (MULTI): ICD-10-CM

## 2024-06-12 DIAGNOSIS — T07.XXXA ABRASIONS OF MULTIPLE SITES: ICD-10-CM

## 2024-06-12 PROBLEM — E27.9 ADRENAL NODULE: Status: ACTIVE | Noted: 2024-06-12

## 2024-06-12 PROCEDURE — 99214 OFFICE O/P EST MOD 30 MIN: CPT | Performed by: NURSE PRACTITIONER

## 2024-06-12 RX ORDER — DOXYCYCLINE 100 MG/1
1 CAPSULE ORAL
COMMUNITY
Start: 2023-07-03 | End: 2024-06-12 | Stop reason: ALTCHOICE

## 2024-06-12 ASSESSMENT — ENCOUNTER SYMPTOMS
FEVER: 0
BRUISES/BLEEDS EASILY: 0
COUGH: 0
SHORTNESS OF BREATH: 0
PALPITATIONS: 0
CHILLS: 0

## 2024-06-12 NOTE — PROGRESS NOTES
"Subjective   Antonia Rodriguez \"Patricia\" is a 69 y.o. female who presents for Hospital Follow-up (Patient was at [facility] on 06/07/2024, patient tripped and fell at [employer]. Patient hit the right side of her chest, and was experiencing rib pain on the right side. Patient had CT scans and chest X-Rays. Patient is having a hard time breathing now and states she thinks her breasts are starting to bruise. ).  HPI This was a mechanical fall. Pt. Thinks she tripped over her own feet. Denies dizziness prior to fall. The patient clarifies that she has pain in right side of chest when breathing in deeply or with cough, but not having shortness of breath.  Reviewed hospital imaging, labs, and notes. No acute fractures noted on CT of cervical spine, chest, abdomen, and pelvis. There are degenerative changes in the cervical spine. There is also a remote compression fracture of L1.  CT head was negative for acute hemorrhage or skull fracture.   Review of Systems   Constitutional:  Negative for chills and fever.   Respiratory:  Negative for cough and shortness of breath.    Cardiovascular:  Negative for chest pain and palpitations.   Hematological:  Does not bruise/bleed easily.   Objective   Physical Exam  Vitals reviewed.   Constitutional:       Appearance: Normal appearance.   HENT:      Head: Normocephalic.   Eyes:      Conjunctiva/sclera: Conjunctivae normal.   Cardiovascular:      Rate and Rhythm: Normal rate and regular rhythm.      Pulses: Normal pulses.      Heart sounds: No murmur heard.  Pulmonary:      Breath sounds: Normal breath sounds.   Abdominal:      General: Bowel sounds are normal.      Palpations: Abdomen is soft.   Musculoskeletal:         General: Tenderness (Tenderness on palpation of right anterior 4th-6th ribs from right sternal border to anterior axillary line.) and signs of injury present. No swelling or deformity.      Cervical back: Neck supple.      Right lower leg: No edema.      " "Left lower leg: No edema.   Skin:     General: Skin is warm and dry.      Findings: Abrasion and bruising (yellow/green bruising on both breasts.) present.      Comments: Abrasions noted to left knee, right elbow, and left 4th toe. All are scabbed with no signs of infection.   Neurological:      General: No focal deficit present.      Mental Status: She is alert and oriented to person, place, and time.   Psychiatric:         Mood and Affect: Mood normal.         Thought Content: Thought content normal.     /86   Pulse 80   Temp 36.8 °C (98.3 °F)   Resp 19   Ht 1.676 m (5' 6\")   Wt 78.9 kg (174 lb)   LMP  (LMP Unknown)   SpO2 96%   BMI 28.08 kg/m²   Assessment/Plan   Problem List Items Addressed This Visit       Adrenal nodule (Multi)    Overview     Noted on CT C/A/P 6/7/24  1.6 cm, stable from prior CT in 2021. Thought to be benign          Other Visit Diagnoses       Fall, subsequent encounter    -  Primary    Continue aqua therapy and stability class at the gym.    Rib pain on right side        Continue to use Tylenol PRN for pain.    Abrasions of multiple sites        Continue bacitracin ointment until healed.        Pt. Offered Prevnar 20 vaccine today, but she declined.           "

## 2024-06-18 DIAGNOSIS — E11.9 CONTROLLED TYPE 2 DIABETES MELLITUS WITHOUT COMPLICATION, WITHOUT LONG-TERM CURRENT USE OF INSULIN (MULTI): ICD-10-CM

## 2024-06-18 RX ORDER — TIRZEPATIDE 7.5 MG/.5ML
7.5 INJECTION, SOLUTION SUBCUTANEOUS
Qty: 2 ML | Refills: 0 | OUTPATIENT
Start: 2024-06-18

## 2024-06-18 NOTE — TELEPHONE ENCOUNTER
Medication refused due to failing protocol.    Requested Prescriptions   Pending Prescriptions Disp Refills    tirzepatide (Mounjaro) 7.5 mg/0.5 mL pen injector 2 mL 0     Sig: Inject 7.5 mg under the skin 1 (one) time per week.       There is no refill protocol information for this order

## 2024-06-20 ENCOUNTER — APPOINTMENT (OUTPATIENT)
Dept: PHARMACY | Facility: HOSPITAL | Age: 69
End: 2024-06-20
Payer: COMMERCIAL

## 2024-06-20 DIAGNOSIS — E11.9 CONTROLLED TYPE 2 DIABETES MELLITUS WITHOUT COMPLICATION, WITHOUT LONG-TERM CURRENT USE OF INSULIN (MULTI): ICD-10-CM

## 2024-06-20 PROCEDURE — RXMED WILLOW AMBULATORY MEDICATION CHARGE

## 2024-06-20 RX ORDER — TIRZEPATIDE 10 MG/.5ML
10 INJECTION, SOLUTION SUBCUTANEOUS
Qty: 2 ML | Refills: 0 | Status: SHIPPED | OUTPATIENT
Start: 2024-06-23

## 2024-06-20 NOTE — PROGRESS NOTES
"Pharmacy Post-Discharge Visit  Antonia Rodriguez \"Patricia\" is a 69 y.o. female was referred to Clinical Pharmacy Team to complete a post-discharge medication optimization and monitoring visit.  The patient was referred for their diabetes, COPD and HF.    Admission Date: 2023  Discharge Date: 2023    Referring Provider: AYE Denise    Subjective   Allergies   Allergen Reactions    Iodinated Contrast Media Hives    Penicillins Hives     Years ago       [id number] - [place], [place] - [address]  [address]  [facility] [address]  Phone: [phone] Fax: [phone]    [facility]  [address]  [facility] [address]  Phone: [phone] Fax: [phone]    Social History     Social History Narrative    Not on file     Notable Medication changes following discharge:  -none    Diabetes  She presents for her follow-up diabetic visit. She has type 2 diabetes mellitus. There are no hypoglycemic associated symptoms. There are no hypoglycemic complications. Current diabetic treatment includes oral agent (monotherapy) (and weekly GLP-1).     Current DM Pharmacotherapy:   -Mounjaro 5 mg weekly  -Farxiga 10mg daily    SECONDARY PREVENTION  - Statin? Yes  - ACE-I/ARB? No  - Aspirin? Yes    Current monitoring regimen:   Patient is using: glucometer    B to 160    Any episodes of hypoglycemia? No  Hypoglycemia awareness? No    Pertinent PMH Review:  - PMH of Pancreatitis: No  - PMH/FH of Medullary Thyroid Cancer: No  - PMH of Retinopathy: No  - PMH of Urinary Tract Infections: No    CHF Assessment    Symptom/Staging:  -Most recent ejection fraction: 55-60%  -Weight changes?: No    Guideline-Directed Medical Therapy:  -ARNI: Yes, describe: Entresto 24-26  -Beta Blocker: Yes, describe: sotalol  -MRA: Yes, describe: spironolactone 25mg   -SGLT2i: Yes, describe: farxiga 10mg daily    Secondary Prevention:  -The ASCVD Risk score (Bibiana LOPEZ, et al., " 2019) failed to calculate for the following reasons:    The patient has a prior MI or stroke diagnosis   -Aspirin 81mg? yes  -Statin?: Yes, describe: atorvastatin 80mg nightly  -HTN?: Yes, describe: Entresto 24-26 bid    BP: none to report    Objective     LMP  (LMP Unknown)      LAB  Lab Results   Component Value Date    BILITOT 0.6 12/18/2023    CALCIUM 9.1 03/15/2024    CO2 26 03/15/2024     03/15/2024    CREATININE 1.07 (H) 03/15/2024    GLUCOSE 290 (H) 03/15/2024    ALKPHOS 80 12/18/2023    K 4.1 03/15/2024    PROT 7.3 12/18/2023     03/15/2024    AST 33 12/18/2023    ALT 25 12/18/2023    BUN 23 03/15/2024    ANIONGAP 12 03/15/2024    MG 2.15 12/18/2023    PHOS 4.0 07/09/2022    ALBUMIN 4.6 12/18/2023    EGFR 56 (L) 03/15/2024     Lab Results   Component Value Date    TRIG 88 03/10/2023    CHOL 95 03/10/2023    HDL 40.5 03/10/2023     Lab Results   Component Value Date    HGBA1C 7.7 (A) 03/14/2024     Current Outpatient Medications on File Prior to Visit   Medication Sig Dispense Refill    ascorbic acid (Vitamin C) 500 mg tablet Take 1 tablet (500 mg) by mouth once daily.      aspirin 81 mg EC tablet TAKE 1 TABLET BY MOUTH DAILY 90 tablet 3    atorvastatin (Lipitor) 80 mg tablet Take 1 tablet (80 mg) by mouth once daily at bedtime. 90 tablet 3    cholecalciferol, vitamin D3, (VITAMIN D3 ORAL) Take by mouth once daily. Unknown dose      cyanocobalamin (Vitamin B-12) 100 mcg tablet Take 1 tablet (100 mcg) by mouth once daily.      dapagliflozin propanediol (Farxiga) 10 mg Take 1 tablet (10 mg) by mouth once daily. 90 tablet 3    docusate sodium (Colace) 100 mg capsule Take 1 capsule (100 mg) by mouth 2 times a day. 60 capsule 5    fluticasone (Flonase) 50 mcg/actuation nasal spray Administer 1 spray into affected nostril(s) once daily as needed.      furosemide (Lasix) 40 mg tablet Take 1 tablet (40 mg) by mouth once daily. (Patient taking differently: Take 1 tablet (40 mg) by mouth if needed.) 20  tablet 3    isosorbide mononitrate ER (Imdur) 60 mg 24 hr tablet TAKE 1 TABLET BY MOUTH DAILY 90 tablet 3    lidocaine 4 % patch Place 1 patch over 12 hours on the skin once daily. Remove & discard patch within 12 hours or as directed by MD. 30 patch 0    nitroglycerin (Nitrostat) 0.4 mg SL tablet DISSOLVE 1 TABLET UNDER THE TONGUE AS NEEDED FOR CHEST PAIN-  MAY REPEAT EVERY 5 MINUTES IF NEEDED ( MAX 3 DOSES.- IF NO RELIEF CALL 911) 25 tablet 3    pantoprazole (ProtoNix) 40 mg EC tablet Take 1 tablet (40 mg) by mouth once daily as needed. Do not crush, chew, or split.      [DISCONTINUED] tirzepatide (Mounjaro) 7.5 mg/0.5 mL pen injector Inject 7.5 mg under the skin 1 (one) time per week. 2 mL 0     No current facility-administered medications on file prior to visit.     Assessment/Plan   Patient had no issues with increased dose of Mounjaro. Patient said BG is starting to come down and is almost to goal. Will increase to 10 mg dose, and follow up in 4 weeks for dose titration.    Plan:  INCREASE Mounjaro to 10 mg weekly  Follow up in 4 weeks to review BG    Danae BrushD, [facility]    Verbal consent to manage patient's drug therapy was obtained from the patient. They were informed they may decline to participate or withdraw from participation in pharmacy services at any time.

## 2024-06-22 ENCOUNTER — PHARMACY VISIT (OUTPATIENT)
Dept: PHARMACY | Facility: CLINIC | Age: 69
End: 2024-06-22
Payer: COMMERCIAL

## 2024-06-27 ENCOUNTER — LAB (OUTPATIENT)
Dept: LAB | Facility: LAB | Age: 69
End: 2024-06-27
Payer: COMMERCIAL

## 2024-06-27 DIAGNOSIS — E07.9 THYROID DISORDER: ICD-10-CM

## 2024-06-27 DIAGNOSIS — M85.852 OSTEOPENIA OF NECK OF LEFT FEMUR: ICD-10-CM

## 2024-06-27 DIAGNOSIS — N18.30 CONTROLLED TYPE 2 DIABETES MELLITUS WITH STAGE 3 CHRONIC KIDNEY DISEASE, WITHOUT LONG-TERM CURRENT USE OF INSULIN (MULTI): ICD-10-CM

## 2024-06-27 DIAGNOSIS — I10 PRIMARY HYPERTENSION: ICD-10-CM

## 2024-06-27 DIAGNOSIS — I50.42 CHRONIC COMBINED SYSTOLIC AND DIASTOLIC HEART FAILURE (MULTI): ICD-10-CM

## 2024-06-27 DIAGNOSIS — E11.22 CONTROLLED TYPE 2 DIABETES MELLITUS WITH STAGE 3 CHRONIC KIDNEY DISEASE, WITHOUT LONG-TERM CURRENT USE OF INSULIN (MULTI): ICD-10-CM

## 2024-06-27 DIAGNOSIS — Z13.6 SCREENING FOR CARDIOVASCULAR CONDITION: ICD-10-CM

## 2024-06-27 DIAGNOSIS — R73.9 BLOOD GLUCOSE ELEVATED: ICD-10-CM

## 2024-06-27 LAB
25(OH)D3 SERPL-MCNC: 25 NG/ML (ref 30–100)
ALBUMIN SERPL BCP-MCNC: 4.2 G/DL (ref 3.4–5)
ALP SERPL-CCNC: 145 U/L (ref 33–136)
ALT SERPL W P-5'-P-CCNC: 15 U/L (ref 7–45)
ANION GAP SERPL CALC-SCNC: 12 MMOL/L (ref 10–20)
AST SERPL W P-5'-P-CCNC: 17 U/L (ref 9–39)
BASOPHILS # BLD AUTO: 0.03 X10*3/UL (ref 0–0.1)
BASOPHILS NFR BLD AUTO: 0.5 %
BILIRUB SERPL-MCNC: 0.6 MG/DL (ref 0–1.2)
BUN SERPL-MCNC: 16 MG/DL (ref 6–23)
CALCIUM SERPL-MCNC: 9.3 MG/DL (ref 8.6–10.3)
CHLORIDE SERPL-SCNC: 105 MMOL/L (ref 98–107)
CHOLEST SERPL-MCNC: 101 MG/DL (ref 0–199)
CHOLESTEROL/HDL RATIO: 2.1
CO2 SERPL-SCNC: 29 MMOL/L (ref 21–32)
CREAT SERPL-MCNC: 0.88 MG/DL (ref 0.5–1.05)
EGFRCR SERPLBLD CKD-EPI 2021: 71 ML/MIN/1.73M*2
EOSINOPHIL # BLD AUTO: 0.23 X10*3/UL (ref 0–0.7)
EOSINOPHIL NFR BLD AUTO: 3.6 %
ERYTHROCYTE [DISTWIDTH] IN BLOOD BY AUTOMATED COUNT: 13.8 % (ref 11.5–14.5)
EST. AVERAGE GLUCOSE BLD GHB EST-MCNC: 166 MG/DL
GLUCOSE SERPL-MCNC: 142 MG/DL (ref 74–99)
HBA1C MFR BLD: 7.4 %
HCT VFR BLD AUTO: 48.8 % (ref 36–46)
HDLC SERPL-MCNC: 47.4 MG/DL
HGB BLD-MCNC: 15.8 G/DL (ref 12–16)
IMM GRANULOCYTES # BLD AUTO: 0.01 X10*3/UL (ref 0–0.7)
IMM GRANULOCYTES NFR BLD AUTO: 0.2 % (ref 0–0.9)
LDLC SERPL CALC-MCNC: 37 MG/DL
LYMPHOCYTES # BLD AUTO: 1.74 X10*3/UL (ref 1.2–4.8)
LYMPHOCYTES NFR BLD AUTO: 27.5 %
MCH RBC QN AUTO: 30.3 PG (ref 26–34)
MCHC RBC AUTO-ENTMCNC: 32.4 G/DL (ref 32–36)
MCV RBC AUTO: 94 FL (ref 80–100)
MONOCYTES # BLD AUTO: 0.45 X10*3/UL (ref 0.1–1)
MONOCYTES NFR BLD AUTO: 7.1 %
NEUTROPHILS # BLD AUTO: 3.86 X10*3/UL (ref 1.2–7.7)
NEUTROPHILS NFR BLD AUTO: 61.1 %
NON HDL CHOLESTEROL: 54 MG/DL (ref 0–149)
NRBC BLD-RTO: 0 /100 WBCS (ref 0–0)
PLATELET # BLD AUTO: 351 X10*3/UL (ref 150–450)
POTASSIUM SERPL-SCNC: 4.6 MMOL/L (ref 3.5–5.3)
PROT SERPL-MCNC: 6.9 G/DL (ref 6.4–8.2)
RBC # BLD AUTO: 5.22 X10*6/UL (ref 4–5.2)
SODIUM SERPL-SCNC: 141 MMOL/L (ref 136–145)
TRIGL SERPL-MCNC: 85 MG/DL (ref 0–149)
TSH SERPL-ACNC: 1.94 MIU/L (ref 0.44–3.98)
VLDL: 17 MG/DL (ref 0–40)
WBC # BLD AUTO: 6.3 X10*3/UL (ref 4.4–11.3)

## 2024-06-27 PROCEDURE — 80061 LIPID PANEL: CPT

## 2024-06-27 PROCEDURE — 84443 ASSAY THYROID STIM HORMONE: CPT

## 2024-06-27 PROCEDURE — 85025 COMPLETE CBC W/AUTO DIFF WBC: CPT

## 2024-06-27 PROCEDURE — 80053 COMPREHEN METABOLIC PANEL: CPT

## 2024-06-27 PROCEDURE — 82306 VITAMIN D 25 HYDROXY: CPT

## 2024-06-27 PROCEDURE — 83036 HEMOGLOBIN GLYCOSYLATED A1C: CPT

## 2024-07-11 ENCOUNTER — APPOINTMENT (OUTPATIENT)
Dept: PRIMARY CARE | Facility: CLINIC | Age: 69
End: 2024-07-11
Payer: COMMERCIAL

## 2024-07-11 VITALS
DIASTOLIC BLOOD PRESSURE: 68 MMHG | WEIGHT: 173 LBS | TEMPERATURE: 97.2 F | BODY MASS INDEX: 27.92 KG/M2 | SYSTOLIC BLOOD PRESSURE: 112 MMHG | RESPIRATION RATE: 20 BRPM | HEART RATE: 80 BPM

## 2024-07-11 DIAGNOSIS — N18.2 CONTROLLED TYPE 2 DIABETES MELLITUS WITH STAGE 2 CHRONIC KIDNEY DISEASE, WITHOUT LONG-TERM CURRENT USE OF INSULIN (MULTI): Primary | ICD-10-CM

## 2024-07-11 DIAGNOSIS — I50.42 CHRONIC COMBINED SYSTOLIC AND DIASTOLIC HEART FAILURE (MULTI): ICD-10-CM

## 2024-07-11 DIAGNOSIS — E11.22 CKD STAGE 2 DUE TO TYPE 2 DIABETES MELLITUS (MULTI): ICD-10-CM

## 2024-07-11 DIAGNOSIS — H60.331 ACUTE SWIMMER'S EAR OF RIGHT SIDE: ICD-10-CM

## 2024-07-11 DIAGNOSIS — E55.9 VITAMIN D INSUFFICIENCY: ICD-10-CM

## 2024-07-11 DIAGNOSIS — E11.9 ENCOUNTER FOR DIABETIC FOOT EXAM (MULTI): ICD-10-CM

## 2024-07-11 DIAGNOSIS — I20.89 CHRONIC STABLE ANGINA (CMS-HCC): ICD-10-CM

## 2024-07-11 DIAGNOSIS — I10 PRIMARY HYPERTENSION: ICD-10-CM

## 2024-07-11 DIAGNOSIS — E11.42 DIABETIC POLYNEUROPATHY ASSOCIATED WITH TYPE 2 DIABETES MELLITUS (MULTI): ICD-10-CM

## 2024-07-11 DIAGNOSIS — E11.22 CONTROLLED TYPE 2 DIABETES MELLITUS WITH STAGE 2 CHRONIC KIDNEY DISEASE, WITHOUT LONG-TERM CURRENT USE OF INSULIN (MULTI): Primary | ICD-10-CM

## 2024-07-11 DIAGNOSIS — N18.2 CKD STAGE 2 DUE TO TYPE 2 DIABETES MELLITUS (MULTI): ICD-10-CM

## 2024-07-11 PROCEDURE — 99214 OFFICE O/P EST MOD 30 MIN: CPT | Performed by: NURSE PRACTITIONER

## 2024-07-11 RX ORDER — NEOMYCIN SULFATE, POLYMYXIN B SULFATE AND HYDROCORTISONE 10; 3.5; 1 MG/ML; MG/ML; [USP'U]/ML
4 SUSPENSION/ DROPS AURICULAR (OTIC) 4 TIMES DAILY
Qty: 10 ML | Refills: 0 | Status: SHIPPED | OUTPATIENT
Start: 2024-07-11 | End: 2024-07-16

## 2024-07-11 RX ORDER — SOTALOL HYDROCHLORIDE 120 MG/1
TABLET ORAL
COMMUNITY

## 2024-07-11 ASSESSMENT — ENCOUNTER SYMPTOMS
LIGHT-HEADEDNESS: 0
PALPITATIONS: 1
DIZZINESS: 0

## 2024-07-11 NOTE — ASSESSMENT & PLAN NOTE
Currently on 10 mg Mounjaro weekly injections, which she has been causing loose stools with urgency twice daily for the last week. She does state she wants to continue with the Mounjaro for now. A1C 7.4%. She will follow-up w/ [facility] next week.

## 2024-07-11 NOTE — ASSESSMENT & PLAN NOTE
Vit D level 25. She states she had been of her supplement for 2 weeks, but now has restarted. Continue Vit D3 1,000 international units daily.

## 2024-07-11 NOTE — PROGRESS NOTES
"Subjective   Antonia Rodriguez \"Patricia\" is a 69 y.o. female who presents for Follow-up (3 month follow up).  HPI  Review of Systems   Cardiovascular:  Positive for chest pain (took 1 nitro SL two nights ago for CP. OK since.) and palpitations (occurs at night). Negative for leg swelling.   Neurological:  Negative for dizziness and light-headedness.     Objective   Physical Exam  Vitals reviewed.   Constitutional:       Appearance: Normal appearance. She is not ill-appearing.   HENT:      Head: Normocephalic.      Right Ear: Tenderness present. No drainage. A middle ear effusion is present. There is mastoid tenderness. Tympanic membrane is not erythematous.      Left Ear: Tympanic membrane normal.   Eyes:      Conjunctiva/sclera: Conjunctivae normal.   Cardiovascular:      Rate and Rhythm: Normal rate and regular rhythm.      Pulses: Normal pulses.   Pulmonary:      Breath sounds: Normal breath sounds.   Abdominal:      General: Bowel sounds are normal.      Palpations: Abdomen is soft.   Musculoskeletal:      Cervical back: Neck supple.   Lymphadenopathy:      Cervical: Cervical adenopathy (mild right tonsillar and anterior cervical) present.   Skin:     General: Skin is warm and dry.   Neurological:      General: No focal deficit present.      Mental Status: She is alert and oriented to person, place, and time.   Psychiatric:         Mood and Affect: Mood normal.         Thought Content: Thought content normal.     /68   Pulse 80   Temp 36.2 °C (97.2 °F)   Resp 20   Wt 78.5 kg (173 lb)   LMP  (LMP Unknown)   BMI 27.92 kg/m²   Assessment/Plan   Problem List Items Addressed This Visit       Hypertension    Overview     Controlled. Continue medications. Continue to monitor.         Chronic stable angina (CMS-HCC)    Overview     Taking isosorbide 60mg every day. Denies chest pain.         Relevant Medications    sotalol (Betapace) 120 mg tablet    Diabetic polyneuropathy associated with type 2 diabetes " mellitus (Multi)    Current Assessment & Plan     Reports feet are numb and cold. She does not want to start any medication for that. There are no wounds on feet on today's exam.         Relevant Orders    Follow Up In [facility] - PCP - Established    Controlled type 2 diabetes mellitus with stage 3 chronic kidney disease, without long-term current use of insulin (Multi) - Primary    Current Assessment & Plan     Currently on 10 mg Mounjaro weekly injections, which she has been causing loose stools with urgency twice daily for the last week. She does state she wants to continue with the Mounjaro for now. A1C 7.4%. She will follow-up w/ [facility] next week.          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 Continue to avoid nephrotoxins.         Relevant Orders    Follow Up In [facility] - PCP - Established    Encounter for diabetic foot exam (Multi)    Vitamin D insufficiency    Current Assessment & Plan     Vit D level 25. She states she had been of her supplement for 2 weeks, but now has restarted. Continue Vit D3 1,000 international units daily.            Other Visit Diagnoses       Chronic combined systolic and diastolic heart failure (Multi)        Relevant Medications    sotalol (Betapace) 120 mg tablet    Acute swimmer's ear of right side        Relevant Medications    neomycin-polymyxin-HC (Cortisporin) 3.5-10,000-1 mg/mL-unit/mL-% otic suspension          Reviewed all labs and recent screening imaging with pt.     Follow-up in 6 months.

## 2024-07-11 NOTE — ASSESSMENT & PLAN NOTE
Reports feet are numb and cold. She does not want to start any medication for that. There are no wounds on feet on today's exam.

## 2024-07-18 ENCOUNTER — APPOINTMENT (OUTPATIENT)
Dept: PHARMACY | Facility: HOSPITAL | Age: 69
End: 2024-07-18
Payer: COMMERCIAL

## 2024-07-18 DIAGNOSIS — E11.9 CONTROLLED TYPE 2 DIABETES MELLITUS WITHOUT COMPLICATION, WITHOUT LONG-TERM CURRENT USE OF INSULIN (MULTI): ICD-10-CM

## 2024-07-18 PROCEDURE — RXMED WILLOW AMBULATORY MEDICATION CHARGE

## 2024-07-18 RX ORDER — TIRZEPATIDE 10 MG/.5ML
10 INJECTION, SOLUTION SUBCUTANEOUS
Qty: 2 ML | Refills: 0 | Status: SHIPPED | OUTPATIENT
Start: 2024-07-21

## 2024-07-18 NOTE — PROGRESS NOTES
"Pharmacy Post-Discharge Visit  Antonia Rodriguez \"Patricia\" is a 69 y.o. female was referred to Clinical Pharmacy Team to complete a post-discharge medication optimization and monitoring visit.  The patient was referred for their diabetes, COPD and HF.    Admission Date: 2023  Discharge Date: 2023    Referring Provider: AYE Denise    Subjective   Allergies   Allergen Reactions    Iodinated Contrast Media Hives    Penicillins Hives     Years ago       [facility] - [place], [place] - [address]  [address]  [facility] [address]  Phone: [phone] Fax: [phone]    [facility]  [address]  [facility] [address]  Phone: [phone] Fax: [phone]    Social History     Social History Narrative    Not on file     Notable Medication changes following discharge:  -none    Diabetes  She presents for her follow-up diabetic visit. She has type 2 diabetes mellitus. There are no hypoglycemic associated symptoms. There are no hypoglycemic complications. Current diabetic treatment includes oral agent (monotherapy) (and weekly GLP-1).     Current DM Pharmacotherapy:   -Mounjaro 10 mg weekly  -Farxiga 10mg daily    SECONDARY PREVENTION  - Statin? Yes  - ACE-I/ARB? No  - Aspirin? Yes    Current monitoring regimen:   Patient is using: glucometer    B, 128, 125, 161, 141, 136, 120, 110, 122, 109    Any episodes of hypoglycemia? No  Hypoglycemia awareness? No    Pertinent PMH Review:  - PMH of Pancreatitis: No  - PMH/FH of Medullary Thyroid Cancer: No  - PMH of Retinopathy: No  - PMH of Urinary Tract Infections: No    CHF Assessment    Symptom/Staging:  -Most recent ejection fraction: 55-60%  -Weight changes?: No    Guideline-Directed Medical Therapy:  -ARNI: Yes, describe: Entresto 24-26  -Beta Blocker: Yes, describe: sotalol  -MRA: Yes, describe: spironolactone 25mg   -SGLT2i: Yes, describe: farxiga 10mg daily    Secondary Prevention:  -The " ASCVD Risk score (Bibiana LOPEZ, et al., 2019) failed to calculate for the following reasons:    The patient has a prior MI or stroke diagnosis   -Aspirin 81mg? yes  -Statin?: Yes, describe: atorvastatin 80mg nightly  -HTN?: Yes, describe: Entresto 24-26 bid    BP: none to report    Objective     LMP  (LMP Unknown)      LAB  Lab Results   Component Value Date    BILITOT 0.6 06/27/2024    CALCIUM 9.3 06/27/2024    CO2 29 06/27/2024     06/27/2024    CREATININE 0.88 06/27/2024    GLUCOSE 142 (H) 06/27/2024    ALKPHOS 145 (H) 06/27/2024    K 4.6 06/27/2024    PROT 6.9 06/27/2024     06/27/2024    AST 17 06/27/2024    ALT 15 06/27/2024    BUN 16 06/27/2024    ANIONGAP 12 06/27/2024    MG 2.15 12/18/2023    PHOS 4.0 07/09/2022    ALBUMIN 4.2 06/27/2024    EGFR 71 06/27/2024     Lab Results   Component Value Date    TRIG 85 06/27/2024    CHOL 101 06/27/2024    LDLCALC 37 06/27/2024    HDL 47.4 06/27/2024     Lab Results   Component Value Date    HGBA1C 7.4 (H) 06/27/2024     Current Outpatient Medications on File Prior to Visit   Medication Sig Dispense Refill    ascorbic acid (Vitamin C) 500 mg tablet Take 1 tablet (500 mg) by mouth once daily.      aspirin 81 mg EC tablet TAKE 1 TABLET BY MOUTH DAILY 90 tablet 3    atorvastatin (Lipitor) 80 mg tablet Take 1 tablet (80 mg) by mouth once daily at bedtime. 90 tablet 3    cholecalciferol, vitamin D3, (VITAMIN D3 ORAL) Take by mouth once daily. Unknown dose      cyanocobalamin (Vitamin B-12) 100 mcg tablet Take 1 tablet (100 mcg) by mouth once daily.      dapagliflozin propanediol (Farxiga) 10 mg Take 1 tablet (10 mg) by mouth once daily. 90 tablet 3    docusate sodium (Colace) 100 mg capsule Take 1 capsule (100 mg) by mouth 2 times a day. 60 capsule 5    fluticasone (Flonase) 50 mcg/actuation nasal spray Administer 1 spray into affected nostril(s) once daily as needed.      furosemide (Lasix) 40 mg tablet Take 1 tablet (40 mg) by mouth once daily. (Patient taking  differently: Take 1 tablet (40 mg) by mouth if needed.) 20 tablet 3    isosorbide mononitrate ER (Imdur) 60 mg 24 hr tablet TAKE 1 TABLET BY MOUTH DAILY 90 tablet 3    [] neomycin-polymyxin-HC (Cortisporin) 3.5-10,000-1 mg/mL-unit/mL-% otic suspension Administer 4 drops into each ear 4 times a day for 5 days. 10 mL 0    nitroglycerin (Nitrostat) 0.4 mg SL tablet DISSOLVE 1 TABLET UNDER THE TONGUE AS NEEDED FOR CHEST PAIN-  MAY REPEAT EVERY 5 MINUTES IF NEEDED ( MAX 3 DOSES.- IF NO RELIEF CALL 911) 25 tablet 3    pantoprazole (ProtoNix) 40 mg EC tablet Take 1 tablet (40 mg) by mouth once daily as needed. Do not crush, chew, or split.      sotalol (Betapace) 120 mg tablet Take by mouth.      tirzepatide (Mounjaro) 10 mg/0.5 mL pen injector Inject 10 mg under the skin 1 (one) time per week. 2 mL 0     No current facility-administered medications on file prior to visit.     Assessment/Plan   Patient had a week of loose stool two time a day. Patient states sx have resolved. Since A1C is close to goal and pts at home FBG is mostly in goal, Will hold patient at current dose and will follow up in 4 weeks to assess at home BG.    Plan:  CONTINUE Mounjaro 10 mg weekly  Follow up in 4 weeks to review BG    Danae Ybarra PharmD, [facility]    Verbal consent to manage patient's drug therapy was obtained from the patient. They were informed they may decline to participate or withdraw from participation in pharmacy services at any time.

## 2024-07-19 ENCOUNTER — PHARMACY VISIT (OUTPATIENT)
Dept: PHARMACY | Facility: CLINIC | Age: 69
End: 2024-07-19
Payer: COMMERCIAL

## 2024-07-29 ENCOUNTER — TELEPHONE (OUTPATIENT)
Dept: CARDIOLOGY | Facility: CLINIC | Age: 69
End: 2024-07-29
Payer: COMMERCIAL

## 2024-07-29 DIAGNOSIS — R00.2 PALPITATIONS: Primary | ICD-10-CM

## 2024-07-29 NOTE — TELEPHONE ENCOUNTER
Spoke to pt - Pt having palpitations and CP intermittently. Rates CP 8/10. Reports SOB & dizziness intermittently. BP today 92/60, yesterday 115/80. HR 90s. Pt states HR has been irregular x 2 weeks per her bp cuff. History of a [employer] Biventricular ICD. Secure chat sent to Dr. Perez. Per Dr. Perez: She can try get remote transmission in to make sure no arrhythmias. Spoke to pt and made aware - pt sending remote transmission. [facility] notified.

## 2024-07-29 NOTE — TELEPHONE ENCOUNTER
Pt called stating she has been having palpitations off and on and is wondering if maybe her magnesium is low and should she take magnesium?    Please advise

## 2024-07-29 NOTE — TELEPHONE ENCOUNTER
Called pt to relay Dr Boyer's message and to let her know that he put an order for magnesium to be checked. Pt understood

## 2024-07-29 NOTE — TELEPHONE ENCOUNTER
Patricia states that she has been having palpitations and chest pain. She has taken nitroglycerin and it does help but she is generally still not feeling very well. She talked to Dr. Boyer today who suggested lab work and possibly starting magnesium. Pt would like to talk with Dr. Perez or nurse.

## 2024-07-30 ENCOUNTER — LAB (OUTPATIENT)
Dept: LAB | Facility: LAB | Age: 69
End: 2024-07-30
Payer: COMMERCIAL

## 2024-07-30 ENCOUNTER — HOSPITAL ENCOUNTER (OUTPATIENT)
Dept: CARDIOLOGY | Facility: CLINIC | Age: 69
Discharge: HOME | End: 2024-07-30
Payer: COMMERCIAL

## 2024-07-30 DIAGNOSIS — Z95.810 ICD (IMPLANTABLE CARDIOVERTER-DEFIBRILLATOR) IN PLACE: ICD-10-CM

## 2024-07-30 DIAGNOSIS — I25.5 ISCHEMIC CARDIOMYOPATHY: ICD-10-CM

## 2024-07-30 DIAGNOSIS — R00.2 PALPITATIONS: ICD-10-CM

## 2024-07-30 LAB — MAGNESIUM SERPL-MCNC: 1.78 MG/DL (ref 1.6–2.4)

## 2024-07-30 PROCEDURE — 36415 COLL VENOUS BLD VENIPUNCTURE: CPT

## 2024-07-30 PROCEDURE — 83735 ASSAY OF MAGNESIUM: CPT

## 2024-07-30 NOTE — TELEPHONE ENCOUNTER
Dr. Perez reviewed transmission - no arrythmias/significant findings noted. Pt to follow up with Dr. Boyer. Advised pt to seek ER evaluation if symptoms persist/worsen.

## 2024-08-03 DIAGNOSIS — I10 ESSENTIAL (PRIMARY) HYPERTENSION: ICD-10-CM

## 2024-08-05 RX ORDER — SOTALOL HYDROCHLORIDE 120 MG/1
120 TABLET ORAL 2 TIMES DAILY
Qty: 180 TABLET | Refills: 2 | Status: SHIPPED | OUTPATIENT
Start: 2024-08-05

## 2024-08-14 ENCOUNTER — HOSPITAL ENCOUNTER (OUTPATIENT)
Dept: CARDIOLOGY | Facility: CLINIC | Age: 69
Discharge: HOME | End: 2024-08-14
Payer: COMMERCIAL

## 2024-08-14 DIAGNOSIS — Z95.810 ICD (IMPLANTABLE CARDIOVERTER-DEFIBRILLATOR) IN PLACE: ICD-10-CM

## 2024-08-14 DIAGNOSIS — I47.29 PAROXYSMAL VENTRICULAR TACHYCARDIA (MULTI): ICD-10-CM

## 2024-08-14 PROCEDURE — 93296 REM INTERROG EVL PM/IDS: CPT

## 2024-08-15 ENCOUNTER — APPOINTMENT (OUTPATIENT)
Dept: PHARMACY | Facility: HOSPITAL | Age: 69
End: 2024-08-15
Payer: COMMERCIAL

## 2024-08-15 DIAGNOSIS — E11.9 CONTROLLED TYPE 2 DIABETES MELLITUS WITHOUT COMPLICATION, WITHOUT LONG-TERM CURRENT USE OF INSULIN (MULTI): ICD-10-CM

## 2024-08-15 PROCEDURE — RXMED WILLOW AMBULATORY MEDICATION CHARGE

## 2024-08-15 RX ORDER — TIRZEPATIDE 12.5 MG/.5ML
12.5 INJECTION, SOLUTION SUBCUTANEOUS
Qty: 2 ML | Refills: 0 | Status: SHIPPED | OUTPATIENT
Start: 2024-08-18

## 2024-08-15 NOTE — PROGRESS NOTES
"Pharmacy Post-Discharge Visit  Antonia Rodriguez \"Patricia\" is a 69 y.o. female was referred to Clinical Pharmacy Team to complete a post-discharge medication optimization and monitoring visit.  The patient was referred for their diabetes, COPD and HF.    Admission Date: 2023  Discharge Date: 2023    Referring Provider: AYE Denise    Subjective   Allergies   Allergen Reactions    Iodinated Contrast Media Hives    Penicillins Hives     Years ago       [email or web address] - [place], [place] - [address]  [address]  [facility] [address]  Phone: [phone] Fax: [phone]    [facility]  [address]  [facility] [address]  Phone: [phone] Fax: [phone]    Social History     Social History Narrative    Not on file     Notable Medication changes following discharge:  -none    Diabetes  She presents for her follow-up diabetic visit. She has type 2 diabetes mellitus. There are no hypoglycemic associated symptoms. There are no hypoglycemic complications. Current diabetic treatment includes oral agent (monotherapy) (and weekly GLP-1).     Current DM Pharmacotherapy:   -Mounjaro 10 mg weekly  -Farxiga 10mg daily    SECONDARY PREVENTION  - Statin? Yes  - ACE-I/ARB? No  - Aspirin? Yes    Current monitoring regimen:   Patient is using: glucometer    B to 140    Any episodes of hypoglycemia? No  Hypoglycemia awareness? No    Pertinent PMH Review:  - PMH of Pancreatitis: No  - PMH/FH of Medullary Thyroid Cancer: No  - PMH of Retinopathy: No  - PMH of Urinary Tract Infections: No    CHF Assessment    Symptom/Staging:  -Most recent ejection fraction: 55-60%  -Weight changes?: No    Guideline-Directed Medical Therapy:  -ARNI: Yes, describe: Entresto 24-26  -Beta Blocker: Yes, describe: sotalol  -MRA: Yes, describe: spironolactone 25mg   -SGLT2i: Yes, describe: farxiga 10mg daily    Secondary Prevention:  -The ASCVD Risk score (Bibiana LOPEZ, et al., " 2019) failed to calculate for the following reasons:    The patient has a prior MI or stroke diagnosis   -Aspirin 81mg? yes  -Statin?: Yes, describe: atorvastatin 80mg nightly  -HTN?: Yes, describe: Entresto 24-26 bid    BP: none to report    Objective     LMP  (LMP Unknown)      LAB  Lab Results   Component Value Date    BILITOT 0.6 06/27/2024    CALCIUM 9.3 06/27/2024    CO2 29 06/27/2024     06/27/2024    CREATININE 0.88 06/27/2024    GLUCOSE 142 (H) 06/27/2024    ALKPHOS 145 (H) 06/27/2024    K 4.6 06/27/2024    PROT 6.9 06/27/2024     06/27/2024    AST 17 06/27/2024    ALT 15 06/27/2024    BUN 16 06/27/2024    ANIONGAP 12 06/27/2024    MG 1.78 07/30/2024    PHOS 4.0 07/09/2022    ALBUMIN 4.2 06/27/2024    EGFR 71 06/27/2024     Lab Results   Component Value Date    TRIG 85 06/27/2024    CHOL 101 06/27/2024    LDLCALC 37 06/27/2024    HDL 47.4 06/27/2024     Lab Results   Component Value Date    HGBA1C 7.4 (H) 06/27/2024     Current Outpatient Medications on File Prior to Visit   Medication Sig Dispense Refill    ascorbic acid (Vitamin C) 500 mg tablet Take 1 tablet (500 mg) by mouth once daily.      aspirin 81 mg EC tablet TAKE 1 TABLET BY MOUTH DAILY 90 tablet 3    atorvastatin (Lipitor) 80 mg tablet Take 1 tablet (80 mg) by mouth once daily at bedtime. 90 tablet 3    cholecalciferol, vitamin D3, (VITAMIN D3 ORAL) Take by mouth once daily. Unknown dose      cyanocobalamin (Vitamin B-12) 100 mcg tablet Take 1 tablet (100 mcg) by mouth once daily.      dapagliflozin propanediol (Farxiga) 10 mg Take 1 tablet (10 mg) by mouth once daily. 90 tablet 3    docusate sodium (Colace) 100 mg capsule Take 1 capsule (100 mg) by mouth 2 times a day. 60 capsule 5    fluticasone (Flonase) 50 mcg/actuation nasal spray Administer 1 spray into affected nostril(s) once daily as needed.      furosemide (Lasix) 40 mg tablet Take 1 tablet (40 mg) by mouth once daily. (Patient taking differently: Take 1 tablet (40 mg) by  mouth if needed.) 20 tablet 3    isosorbide mononitrate ER (Imdur) 60 mg 24 hr tablet TAKE 1 TABLET BY MOUTH DAILY 90 tablet 3    nitroglycerin (Nitrostat) 0.4 mg SL tablet DISSOLVE 1 TABLET UNDER THE TONGUE AS NEEDED FOR CHEST PAIN-  MAY REPEAT EVERY 5 MINUTES IF NEEDED ( MAX 3 DOSES.- IF NO RELIEF CALL 911) 25 tablet 3    pantoprazole (ProtoNix) 40 mg EC tablet Take 1 tablet (40 mg) by mouth once daily as needed. Do not crush, chew, or split.      sotalol (Betapace) 120 mg tablet TAKE 1 TABLET BY MOUTH TWICE DAILY 180 tablet 2    tirzepatide (Mounjaro) 10 mg/0.5 mL pen injector Inject 1 pen (10 mg) under the skin 1 (one) time per week. 2 mL 0     No current facility-administered medications on file prior to visit.     Assessment/Plan   Patient reports ADR have resolved with Mounjaro. Fasting BG is mostly in goal at this time, but reports she will still get some readings over 140's. Will increase Mounjaro to 12.5 mg for better BG control. Will follow up in 4 weeks to assess at home BG.    Plan:  INCREASE Mounjaro to 12.5 mg weekly  Follow up in 4 weeks to review BG    Danae BrushD, [facility]    Verbal consent to manage patient's drug therapy was obtained from the patient. They were informed they may decline to participate or withdraw from participation in pharmacy services at any time.

## 2024-08-16 ENCOUNTER — TELEPHONE (OUTPATIENT)
Dept: CARDIOLOGY | Facility: CLINIC | Age: 69
End: 2024-08-16
Payer: COMMERCIAL

## 2024-08-16 ENCOUNTER — PHARMACY VISIT (OUTPATIENT)
Dept: PHARMACY | Facility: CLINIC | Age: 69
End: 2024-08-16
Payer: COMMERCIAL

## 2024-08-16 NOTE — TELEPHONE ENCOUNTER
Per Dr. Boyer, advised patient to stay hydrated and go slow with position changes. Also advised pt to reach out to EP for further recommendations. She verbalized understanding. Ritika Jarvis RN

## 2024-08-16 NOTE — TELEPHONE ENCOUNTER
Pt has been dizzy and lightheaded through out the day for the past 2 weeks and wondering if it could be because of Sotalol? Her BP is fine but people are telling her its the Sotalol but she's been taking it for about 3 yrs, per pt.    Thanks

## 2024-08-30 ENCOUNTER — TELEPHONE (OUTPATIENT)
Dept: CARDIOLOGY | Facility: CLINIC | Age: 69
End: 2024-08-30
Payer: COMMERCIAL

## 2024-08-30 ENCOUNTER — TELEPHONE (OUTPATIENT)
Dept: PRIMARY CARE | Facility: CLINIC | Age: 69
End: 2024-08-30
Payer: COMMERCIAL

## 2024-08-30 DIAGNOSIS — U07.1 COVID-19: Primary | ICD-10-CM

## 2024-08-30 RX ORDER — NIRMATRELVIR AND RITONAVIR 300-100 MG
3 KIT ORAL 2 TIMES DAILY
Qty: 30 TABLET | Refills: 0 | Status: SHIPPED | OUTPATIENT
Start: 2024-08-30 | End: 2024-08-30

## 2024-08-30 RX ORDER — NIRMATRELVIR AND RITONAVIR 300-100 MG
3 KIT ORAL 2 TIMES DAILY
Qty: 30 TABLET | Refills: 0 | Status: SHIPPED | OUTPATIENT
Start: 2024-08-30 | End: 2024-09-04

## 2024-08-30 NOTE — TELEPHONE ENCOUNTER
Pt isnt feeling well. She has congestion and ST. She wants to know if she can take anything over the counter that will be ok with the heart meds she is on. She wants to take a nyquil or decongestant.

## 2024-08-30 NOTE — TELEPHONE ENCOUNTER
Per Dr. Boyer, notified patient that it is safe to take nyquil with her other meds. Ritika Jarvis RN

## 2024-09-12 ENCOUNTER — APPOINTMENT (OUTPATIENT)
Dept: PHARMACY | Facility: HOSPITAL | Age: 69
End: 2024-09-12
Payer: COMMERCIAL

## 2024-09-12 DIAGNOSIS — E11.9 CONTROLLED TYPE 2 DIABETES MELLITUS WITHOUT COMPLICATION, WITHOUT LONG-TERM CURRENT USE OF INSULIN (MULTI): ICD-10-CM

## 2024-09-16 PROCEDURE — RXMED WILLOW AMBULATORY MEDICATION CHARGE

## 2024-09-16 RX ORDER — TIRZEPATIDE 12.5 MG/.5ML
12.5 INJECTION, SOLUTION SUBCUTANEOUS
Qty: 6 ML | Refills: 0 | Status: SHIPPED | OUTPATIENT
Start: 2024-09-16

## 2024-09-16 NOTE — PROGRESS NOTES
"Pharmacy Post-Discharge Visit  Antonia Rodriguez \"Patricia\" is a 69 y.o. female was referred to Clinical Pharmacy Team to complete a post-discharge medication optimization and monitoring visit.  The patient was referred for their diabetes, COPD and HF.    Admission Date: 2023  Discharge Date: 2023    Referring Provider: AYE Denise    Subjective   Allergies   Allergen Reactions    Iodinated Contrast Media Hives    Penicillins Hives     Years ago       [email or web address] - [place], [place] - [address]  [address]  [facility] [address]  Phone: [phone] Fax: [phone]    [facility]  [address]  [facility] [address]  Phone: [phone] Fax: [phone]    [facility][id number] - [place], [place] - [address]  [address]  [facility] [address]  Phone: [phone] Fax: [phone]    Social History     Social History Narrative    Not on file     Notable Medication changes following discharge:  -none    Diabetes  She presents for her follow-up diabetic visit. She has type 2 diabetes mellitus. There are no hypoglycemic associated symptoms. There are no hypoglycemic complications. Current diabetic treatment includes oral agent (monotherapy) (and weekly GLP-1).     Current DM Pharmacotherapy:   -Mounjaro 12.5 mg weekly  -Farxiga 10mg daily    SECONDARY PREVENTION  - Statin? Yes  - ACE-I/ARB? No  - Aspirin? Yes    Current monitoring regimen:   Patient is using: glucometer    B to 140    Any episodes of hypoglycemia? No  Hypoglycemia awareness? No    Pertinent PMH Review:  - PMH of Pancreatitis: No  - PMH/FH of Medullary Thyroid Cancer: No  - PMH of Retinopathy: No  - PMH of Urinary Tract Infections: No    CHF Assessment    Symptom/Staging:  -Most recent ejection fraction: 55-60%  -Weight changes?: No    Guideline-Directed Medical Therapy:  -ARNI: Yes, describe: Entresto 24-  -Beta Blocker: Yes, " describe: sotalol  -MRA: Yes, describe: spironolactone 25mg   -SGLT2i: Yes, describe: farxiga 10mg daily    Secondary Prevention:  -The ASCVD Risk score (Bibiana LOPEZ, et al., 2019) failed to calculate for the following reasons:    The patient has a prior MI or stroke diagnosis   -Aspirin 81mg? yes  -Statin?: Yes, describe: atorvastatin 80mg nightly  -HTN?: Yes, describe: Entresto 24-26 bid    BP: none to report    Objective     LMP  (LMP Unknown)      LAB  Lab Results   Component Value Date    BILITOT 0.6 06/27/2024    CALCIUM 9.3 06/27/2024    CO2 29 06/27/2024     06/27/2024    CREATININE 0.88 06/27/2024    GLUCOSE 142 (H) 06/27/2024    ALKPHOS 145 (H) 06/27/2024    K 4.6 06/27/2024    PROT 6.9 06/27/2024     06/27/2024    AST 17 06/27/2024    ALT 15 06/27/2024    BUN 16 06/27/2024    ANIONGAP 12 06/27/2024    MG 1.78 07/30/2024    PHOS 4.0 07/09/2022    ALBUMIN 4.2 06/27/2024    EGFR 71 06/27/2024     Lab Results   Component Value Date    TRIG 85 06/27/2024    CHOL 101 06/27/2024    LDLCALC 37 06/27/2024    HDL 47.4 06/27/2024     Lab Results   Component Value Date    HGBA1C 7.4 (H) 06/27/2024     Current Outpatient Medications on File Prior to Visit   Medication Sig Dispense Refill    ascorbic acid (Vitamin C) 500 mg tablet Take 1 tablet (500 mg) by mouth once daily.      aspirin 81 mg EC tablet TAKE 1 TABLET BY MOUTH DAILY 90 tablet 3    atorvastatin (Lipitor) 80 mg tablet Take 1 tablet (80 mg) by mouth once daily at bedtime. 90 tablet 3    cholecalciferol, vitamin D3, (VITAMIN D3 ORAL) Take by mouth once daily. Unknown dose      cyanocobalamin (Vitamin B-12) 100 mcg tablet Take 1 tablet (100 mcg) by mouth once daily.      dapagliflozin propanediol (Farxiga) 10 mg Take 1 tablet (10 mg) by mouth once daily. 90 tablet 3    docusate sodium (Colace) 100 mg capsule Take 1 capsule (100 mg) by mouth 2 times a day. 60 capsule 5    fluticasone (Flonase) 50 mcg/actuation nasal spray Administer 1 spray into  affected nostril(s) once daily as needed.      furosemide (Lasix) 40 mg tablet Take 1 tablet (40 mg) by mouth once daily. (Patient taking differently: Take 1 tablet (40 mg) by mouth if needed.) 20 tablet 3    isosorbide mononitrate ER (Imdur) 60 mg 24 hr tablet TAKE 1 TABLET BY MOUTH DAILY 90 tablet 3    nitroglycerin (Nitrostat) 0.4 mg SL tablet DISSOLVE 1 TABLET UNDER THE TONGUE AS NEEDED FOR CHEST PAIN-  MAY REPEAT EVERY 5 MINUTES IF NEEDED ( MAX 3 DOSES.- IF NO RELIEF CALL 911) 25 tablet 3    pantoprazole (ProtoNix) 40 mg EC tablet Take 1 tablet (40 mg) by mouth once daily as needed. Do not crush, chew, or split.      sotalol (Betapace) 120 mg tablet TAKE 1 TABLET BY MOUTH TWICE DAILY 180 tablet 2    tirzepatide (Mounjaro) 12.5 mg/0.5 mL pen injector Inject 1 pen (12.5 mg) under the skin 1 (one) time per week. 2 mL 0     No current facility-administered medications on file prior to visit.     Assessment/Plan   Patient reports ADR have resolved with Mounjaro. Fasting BG is I goal at this time. Patient feels like l=this dose is working best for her and would like to hold at Mounjaro 12.5 mg.  Will follow up in 8 weeks to assess at home BG and updated A1C.    Plan:  CONTINUE Mounjaro to 12.5 mg weekly  Follow up in 8 weeks to review BG    Danae BrushD, [facility]    Verbal consent to manage patient's drug therapy was obtained from the patient. They were informed they may decline to participate or withdraw from participation in pharmacy services at any time.

## 2024-09-18 ENCOUNTER — PHARMACY VISIT (OUTPATIENT)
Dept: PHARMACY | Facility: CLINIC | Age: 69
End: 2024-09-18
Payer: COMMERCIAL

## 2024-09-20 ENCOUNTER — TELEPHONE (OUTPATIENT)
Dept: CARDIOLOGY | Facility: CLINIC | Age: 69
End: 2024-09-20
Payer: COMMERCIAL

## 2024-09-20 NOTE — TELEPHONE ENCOUNTER
Patricia states over past couple of weeks BP has been elevated.  140s/90s but it goes down after exercise for example today 145/95 after water aerobics 111/77.  She is wondering if she should restart Entresto?

## 2024-10-07 DIAGNOSIS — N18.2 CONTROLLED TYPE 2 DIABETES MELLITUS WITH STAGE 2 CHRONIC KIDNEY DISEASE, WITHOUT LONG-TERM CURRENT USE OF INSULIN (MULTI): ICD-10-CM

## 2024-10-07 DIAGNOSIS — E11.22 CONTROLLED TYPE 2 DIABETES MELLITUS WITH STAGE 2 CHRONIC KIDNEY DISEASE, WITHOUT LONG-TERM CURRENT USE OF INSULIN (MULTI): ICD-10-CM

## 2024-10-07 DIAGNOSIS — E55.9 VITAMIN D INSUFFICIENCY: Primary | ICD-10-CM

## 2024-10-07 DIAGNOSIS — E83.42 HYPOMAGNESEMIA: ICD-10-CM

## 2024-10-08 ENCOUNTER — LAB (OUTPATIENT)
Dept: LAB | Facility: LAB | Age: 69
End: 2024-10-08
Payer: COMMERCIAL

## 2024-10-08 DIAGNOSIS — E55.9 VITAMIN D INSUFFICIENCY: ICD-10-CM

## 2024-10-08 DIAGNOSIS — E83.42 HYPOMAGNESEMIA: ICD-10-CM

## 2024-10-08 DIAGNOSIS — N18.2 CONTROLLED TYPE 2 DIABETES MELLITUS WITH STAGE 2 CHRONIC KIDNEY DISEASE, WITHOUT LONG-TERM CURRENT USE OF INSULIN (MULTI): ICD-10-CM

## 2024-10-08 DIAGNOSIS — E11.22 CONTROLLED TYPE 2 DIABETES MELLITUS WITH STAGE 2 CHRONIC KIDNEY DISEASE, WITHOUT LONG-TERM CURRENT USE OF INSULIN (MULTI): ICD-10-CM

## 2024-10-08 LAB
25(OH)D3 SERPL-MCNC: 26 NG/ML (ref 30–100)
MAGNESIUM SERPL-MCNC: 2.13 MG/DL (ref 1.6–2.4)

## 2024-10-08 PROCEDURE — 82306 VITAMIN D 25 HYDROXY: CPT

## 2024-10-08 PROCEDURE — 83735 ASSAY OF MAGNESIUM: CPT

## 2024-10-08 PROCEDURE — 83036 HEMOGLOBIN GLYCOSYLATED A1C: CPT

## 2024-10-09 LAB
EST. AVERAGE GLUCOSE BLD GHB EST-MCNC: 140 MG/DL
HBA1C MFR BLD: 6.5 %

## 2024-10-09 NOTE — RESULT ENCOUNTER NOTE
Dashawn Knight,    Dr. Obregon here again.  Your sugar has come down nicely congratulations, keep up the good work!    Your vitamin D level is still on the low normal side at 26.  It should be between 30 and 100 the closer to 50 the better. It is an important vitamin for your heart, lungs, immune system and bones among other things in your body.  I do not know what dose you were taking before but I would recommend over the counter vitamin D3 at 4000 units daily to get it into and keep it in the normal range and recheck in February 2025.     Your magnesium level was good.    The vitamin D should help your legs but if your symptoms continue follow-up with [facility] next week.    Have a Great Day!!!    Dr. Obregon

## 2024-10-14 PROCEDURE — RXMED WILLOW AMBULATORY MEDICATION CHARGE

## 2024-10-17 ENCOUNTER — APPOINTMENT (OUTPATIENT)
Dept: RADIOLOGY | Facility: HOSPITAL | Age: 69
End: 2024-10-17
Payer: COMMERCIAL

## 2024-10-17 ENCOUNTER — OFFICE VISIT (OUTPATIENT)
Dept: PRIMARY CARE | Facility: CLINIC | Age: 69
End: 2024-10-17

## 2024-10-17 ENCOUNTER — PHARMACY VISIT (OUTPATIENT)
Dept: PHARMACY | Facility: CLINIC | Age: 69
End: 2024-10-17
Payer: COMMERCIAL

## 2024-10-17 ENCOUNTER — HOSPITAL ENCOUNTER (EMERGENCY)
Facility: HOSPITAL | Age: 69
Discharge: HOME | End: 2024-10-17
Attending: EMERGENCY MEDICINE
Payer: COMMERCIAL

## 2024-10-17 ENCOUNTER — APPOINTMENT (OUTPATIENT)
Dept: CARDIOLOGY | Facility: HOSPITAL | Age: 69
End: 2024-10-17
Payer: COMMERCIAL

## 2024-10-17 VITALS
HEART RATE: 76 BPM | TEMPERATURE: 97.1 F | WEIGHT: 168 LBS | RESPIRATION RATE: 16 BRPM | OXYGEN SATURATION: 97 % | SYSTOLIC BLOOD PRESSURE: 102 MMHG | DIASTOLIC BLOOD PRESSURE: 71 MMHG | BODY MASS INDEX: 27.12 KG/M2

## 2024-10-17 VITALS
HEART RATE: 75 BPM | DIASTOLIC BLOOD PRESSURE: 56 MMHG | BODY MASS INDEX: 27 KG/M2 | TEMPERATURE: 97.3 F | HEIGHT: 66 IN | RESPIRATION RATE: 18 BRPM | WEIGHT: 168 LBS | SYSTOLIC BLOOD PRESSURE: 113 MMHG | OXYGEN SATURATION: 96 %
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
## 2024-10-17 DIAGNOSIS — M25.462 EFFUSION OF LEFT KNEE: Primary | ICD-10-CM

## 2024-10-17 DIAGNOSIS — R29.6 RECURRENT FALLS: ICD-10-CM

## 2024-10-17 DIAGNOSIS — S89.90XA KNEE INJURY, INITIAL ENCOUNTER: ICD-10-CM

## 2024-10-17 DIAGNOSIS — T14.8XXA HEMATOMA: Primary | ICD-10-CM

## 2024-10-17 DIAGNOSIS — M79.605 PAIN IN LEFT LEG: ICD-10-CM

## 2024-10-17 PROCEDURE — 99214 OFFICE O/P EST MOD 30 MIN: CPT | Performed by: NURSE PRACTITIONER

## 2024-10-17 PROCEDURE — 99284 EMERGENCY DEPT VISIT MOD MDM: CPT | Mod: 25

## 2024-10-17 PROCEDURE — 93971 EXTREMITY STUDY: CPT

## 2024-10-17 PROCEDURE — 73560 X-RAY EXAM OF KNEE 1 OR 2: CPT | Mod: LT

## 2024-10-17 PROCEDURE — 73560 X-RAY EXAM OF KNEE 1 OR 2: CPT | Mod: LEFT SIDE | Performed by: STUDENT IN AN ORGANIZED HEALTH CARE EDUCATION/TRAINING PROGRAM

## 2024-10-17 PROCEDURE — 99284 EMERGENCY DEPT VISIT MOD MDM: CPT | Performed by: EMERGENCY MEDICINE

## 2024-10-17 PROCEDURE — 93971 EXTREMITY STUDY: CPT | Performed by: SURGERY

## 2024-10-17 ASSESSMENT — ENCOUNTER SYMPTOMS
CHILLS: 0
NAUSEA: 1
DEPRESSION: 0
SHORTNESS OF BREATH: 1
COUGH: 1
FEVER: 0
OCCASIONAL FEELINGS OF UNSTEADINESS: 1
LOSS OF SENSATION IN FEET: 0
PALPITATIONS: 1
VOMITING: 0
NUMBNESS: 1

## 2024-10-17 ASSESSMENT — PAIN DESCRIPTION - PAIN TYPE
TYPE: ACUTE PAIN
TYPE: ACUTE PAIN

## 2024-10-17 ASSESSMENT — PAIN DESCRIPTION - LOCATION
LOCATION: KNEE
LOCATION: KNEE

## 2024-10-17 ASSESSMENT — LIFESTYLE VARIABLES
EVER HAD A DRINK FIRST THING IN THE MORNING TO STEADY YOUR NERVES TO GET RID OF A HANGOVER: NO
EVER FELT BAD OR GUILTY ABOUT YOUR DRINKING: NO
TOTAL SCORE: 0
HAVE PEOPLE ANNOYED YOU BY CRITICIZING YOUR DRINKING: NO
HAVE YOU EVER FELT YOU SHOULD CUT DOWN ON YOUR DRINKING: NO

## 2024-10-17 ASSESSMENT — PAIN SCALES - GENERAL
PAINLEVEL_OUTOF10: 7
PAINLEVEL_OUTOF10: 10 - WORST POSSIBLE PAIN

## 2024-10-17 ASSESSMENT — PAIN DESCRIPTION - ORIENTATION: ORIENTATION: LEFT

## 2024-10-17 ASSESSMENT — PAIN - FUNCTIONAL ASSESSMENT: PAIN_FUNCTIONAL_ASSESSMENT: 0-10

## 2024-10-17 NOTE — ED TRIAGE NOTES
"   TRIAGE NOTE   I saw the patient as the Clinician in Triage and performed a brief history and physical exam, established acuity, and ordered appropriate tests to develop basic plan of care. Patient will be seen by an [facility], resident and/or physician who will independently evaluate the patient. Please see subsequent provider notes for further details and disposition.     Brief HPI: In brief, Antonia Rodriguez \"Patricia\" is a 69 y.o. female that presents for left knee pain following a fall 1 week ago.     Focused Physical exam:   Tender diffusely over leg/knee, swelling noted calf with bruise     Plan/MDM:   XR lt knee, dvt calf lt     Please see subsequent provider note for further details and disposition     "

## 2024-10-17 NOTE — ASSESSMENT & PLAN NOTE
Will likely need P.T. for stability, but needs knee eval first to determine extend of her injury. She does take a stability class at her gym.

## 2024-10-17 NOTE — PROGRESS NOTES
"Subjective   Antonia Rodriguez \"Patricia\" is a 69 y.o. female who presents for Knee Pain (Fell last Thursday- her legs gave out and she fell on her left knee in her kitchen. Had used ice and heat and was feeling fine up until yesterday. Left knee is bruised, painful and swelling. She has tried tylenol and has an ace bandage non but neither are helpful. ).  HPI  Review of Systems   Constitutional:  Negative for chills and fever.   Respiratory:  Positive for cough (yesterday had cough, better today) and shortness of breath (comes and goes. This is chronic).    Cardiovascular:  Positive for palpitations. Negative for chest pain.   Gastrointestinal:  Positive for nausea (due to pain in left knee). Negative for vomiting.   Neurological:  Positive for numbness (chronic in tomas feet, but now numb in left anterior lower leg below the knee.).     Objective   Physical Exam  Constitutional:       General: She is in acute distress.      Appearance: Normal appearance. She is not ill-appearing.   Musculoskeletal:      Right knee: Normal.      Left knee: Swelling, effusion and ecchymosis present. No deformity. Decreased range of motion. Tenderness present over the medial joint line.      Comments: Exquisitely tender on light palpation. Unable to perform special maneuvers for complete exam d/t severity of pain. Can't bend knee beyond 90 degrees.    Skin:     General: Skin is warm and dry.      Findings: Ecchymosis (left knee; left anterior shin.) and signs of injury present. No abscess.   Neurological:      General: No focal deficit present.      Mental Status: She is alert and oriented to person, place, and time.   Psychiatric:         Mood and Affect: Mood normal.     /71   Pulse 76   Temp 36.2 °C (97.1 °F)   Resp 16   Wt 76.2 kg (168 lb)   LMP  (LMP Unknown)   SpO2 97%   BMI 27.12 kg/m²   Assessment/Plan   Problem List Items Addressed This Visit       Recurrent falls    Current Assessment & Plan     Will likely need P.T. " for stability, but needs knee eval first to determine extend of her injury. She does take a stability class at her gym.          Other Visit Diagnoses       Effusion of left knee    -  Primary    Advised pt to go to ER for further eval/imaging, treatment, and pain control    Knee injury, initial encounter        Occurred after mechanical fall 8 days ago, states legs gave out. Advised to go to ER. She does a stability class and water aerobics at gym.          Advised to go to ER for eval now.

## 2024-10-18 ENCOUNTER — OFFICE VISIT (OUTPATIENT)
Dept: ORTHOPEDIC SURGERY | Facility: CLINIC | Age: 69
End: 2024-10-18
Payer: COMMERCIAL

## 2024-10-18 ENCOUNTER — HOSPITAL ENCOUNTER (OUTPATIENT)
Dept: RADIOLOGY | Facility: HOSPITAL | Age: 69
Discharge: HOME | End: 2024-10-18
Payer: COMMERCIAL

## 2024-10-18 DIAGNOSIS — S80.02XA CONTUSION OF LEFT KNEE, INITIAL ENCOUNTER: ICD-10-CM
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
## 2024-10-18 DIAGNOSIS — T14.8XXA HEMATOMA: ICD-10-CM

## 2024-10-18 DIAGNOSIS — S80.02XA TRAUMATIC HEMATOMA OF KNEE, LEFT, INITIAL ENCOUNTER: ICD-10-CM

## 2024-10-18 DIAGNOSIS — M79.605 PAIN IN LEFT LEG: ICD-10-CM

## 2024-10-18 PROCEDURE — 99213 OFFICE O/P EST LOW 20 MIN: CPT | Performed by: INTERNAL MEDICINE

## 2024-10-18 PROCEDURE — 73700 CT LOWER EXTREMITY W/O DYE: CPT | Mod: LT

## 2024-10-18 RX ORDER — ACETAMINOPHEN AND CODEINE PHOSPHATE 300; 30 MG/1; MG/1
1 TABLET ORAL 2 TIMES DAILY PRN
Qty: 14 TABLET | Refills: 0 | Status: SHIPPED | OUTPATIENT
Start: 2024-10-18 | End: 2024-10-25

## 2024-10-18 ASSESSMENT — PATIENT HEALTH QUESTIONNAIRE - PHQ9
SUM OF ALL RESPONSES TO PHQ9 QUESTIONS 1 AND 2: 0
2. FEELING DOWN, DEPRESSED OR HOPELESS: NOT AT ALL
1. LITTLE INTEREST OR PLEASURE IN DOING THINGS: NOT AT ALL

## 2024-10-18 NOTE — ED PROVIDER NOTES
HPI   Chief Complaint   Patient presents with    Knee Injury     Pt states one week ago she had a mechanical fall and landed on her left knee - knee is swollen, hot to the touch, and pain level is 10/10. Pt took tylenol prior to coming miguelina the ED.       69-year-old woman who states that she fell 1 week ago and fell onto her left knee.  She saw our nurse practitioner in the office today who referred her to the ED for x-rays.  She denies any numbness tingling or weakness in her lower extremity.  She does notice some swelling of her calf.  She has bruising.  Nothing makes her feel better or worse.  Has not taken any medications prior to arrival              Patient History   Past Medical History:   Diagnosis Date    Arterial stent thrombosis, initial encounter ([facility])     7 stents - pt has defiberilator    CHF (congestive heart failure) November 16 2021    Coronary artery disease     Decreased range of motion (ROM) of shoulder 12/13/2023    Diabetes (Multi)     Heart disease 11/16/2021    Heart failure     Hypertension     Left shoulder pain 02/19/2023    Ddx includes adhesive capsulitis. AC joint osteoarthritis rotator cuff tendinitis, biceps tendinitis, and deltoid tendinitis given the various positive physical exam findings. May be related to upper arm usage during cardiac rehab. Limited treatment options given DM and use of blood thinners. Provided AAOS shoulder exercises and stretches. Recommend using of heating pads, following by exercises, f    Myocardial infarction (Multi) 01/03/2024    Comment on above: STEMI    Pacemaker     Personal history of other medical treatment     History of echocardiogram    Sleep apnea     Ulcer of toe of left foot (Multi) 08/02/2022    Ulcer of toe of left foot, with fat layer exposed (Multi) 06/09/2023    Unstable angina pectoris (Multi) 07/07/2022     Past Surgical History:   Procedure Laterality Date    CARDIAC CATHETERIZATION N/A 12/18/2023    Procedure: Left Heart Cath;   Surgeon: Juan Boyer MD;  Location: [facility] Cardiac Cath Lab;  Service: Cardiovascular;  Laterality: N/A;    CARDIAC CATHETERIZATION N/A 2023    Procedure: IFR (Instant Wave Free Ration);  Surgeon: Juan Boyer MD;  Location: [facility] Cardiac Cath Lab;  Service: Cardiovascular;  Laterality: N/A;     SECTION, LOW TRANSVERSE  1976  and 1979    CORONARY ANGIOPLASTY      CORONARY STENT PLACEMENT      INSERT / REPLACE / REMOVE PACEMAKER      OTHER SURGICAL HISTORY  2022    Cardiac catheterization with stent placement    OTHER SURGICAL HISTORY  2022    Cardioverter defibrillator insertion     Family History   Problem Relation Name Age of Onset    Pancreatic cancer Mother MotherDortmaude M Mcbrides     Cancer Mother Motherrtmaude M Mcbrides     Lung cancer Father Dad Tonny Frey     Diabetes Father Dad Tonny Frey     Hypertension Father Dad Tonny Frey      Social History     Tobacco Use    Smoking status: Former     Current packs/day: 0.00     Average packs/day: 1 pack/day for 36.8 years (36.8 ttl pk-yrs)     Types: Cigarettes     Start date: 1985     Quit date: 2021     Years since quittin.9    Smokeless tobacco: Never    Tobacco comments:     Stop  no urge to restart   Vaping Use    Vaping status: Never Used   Substance Use Topics    Alcohol use: Not Currently     Alcohol/week: 3.0 standard drinks of alcohol     Types: 1 Glasses of wine, 2 Cans of beer per week     Comment: occasional    Drug use: Not Currently     Types: Marijuana       Physical Exam   ED Triage Vitals [10/17/24 1236]   Temperature Heart Rate Respirations BP   36.3 °C (97.3 °F) 92 18 114/77      Pulse Ox Temp Source Heart Rate Source Patient Position   98 % Temporal Monitor Sitting      BP Location FiO2 (%)     Right arm --       Physical Exam  Vitals and nursing note reviewed.   Constitutional:       General: She is not in acute distress.     Appearance: She is well-developed.    HENT:      Head: Normocephalic and atraumatic.   Eyes:      Conjunctiva/sclera: Conjunctivae normal.   Cardiovascular:      Rate and Rhythm: Normal rate and regular rhythm.      Heart sounds: No murmur heard.  Pulmonary:      Effort: Pulmonary effort is normal. No respiratory distress.      Breath sounds: Normal breath sounds.   Abdominal:      Palpations: Abdomen is soft.      Tenderness: There is no abdominal tenderness.   Musculoskeletal:         General: No swelling.      Cervical back: Neck supple.   Skin:     General: Skin is warm and dry.      Capillary Refill: Capillary refill takes less than 2 seconds.      Findings: Bruising present.      Comments: Lle bruised and tender over patella. Full ROM. Ambulatory.   Neurological:      Mental Status: She is alert.   Psychiatric:         Mood and Affect: Mood normal.           ED Course & MDM   Diagnoses as of 10/17/24 2022   Hematoma   Fall, initial encounter                 No data recorded     García Coma Scale Score: 15 (10/17/24 1239 : Yessy Dudley RN)                           Medical Decision Making  69-year-old woman with injury to left knee.  Radiographs show no fracture.  There is no DVT present.  Concern for possible quadriceps hematoma.  Hemodynamically stable nontachycardic no evidence of significant blood loss.  Injury was a week ago.  Doubt expanding hematoma.  Will have follow-up with PCP and orthopedics.        Procedure  Procedures     Brenda Santo MD  10/17/24 2022

## 2024-10-18 NOTE — PROGRESS NOTES
"  Acute Injury New Patient Visit    CC: No chief complaint on file.      HPI: Antonia \"Yohan" is a 69 y.o. female presents today for evaluation for acute left knee injury sustained a week ago after a fall. She notes left knee pain, swelling, and bruising. She was evaluated in the ER and had x-rays taken. She is here for initial evaluation.  She was told she had a hematoma.  Pain has not improved.        Review of Systems   GENERAL: Negative for malaise, significant weight loss, fever  MUSCULOSKELETAL: See HPI  NEURO:  Negative for numbness / tingling     Past Medical History  Past Medical History:   Diagnosis Date    Arterial stent thrombosis, initial encounter (CMS-[facility])     7 stents - pt has defiberilator    CHF (congestive heart failure) November 16 2021    Coronary artery disease     Decreased range of motion (ROM) of shoulder 12/13/2023    Diabetes (Multi)     Heart disease 11/16/2021    Heart failure     Hypertension     Left shoulder pain 02/19/2023    Ddx includes adhesive capsulitis. AC joint osteoarthritis rotator cuff tendinitis, biceps tendinitis, and deltoid tendinitis given the various positive physical exam findings. May be related to upper arm usage during cardiac rehab. Limited treatment options given DM and use of blood thinners. Provided AAOS shoulder exercises and stretches. Recommend using of heating pads, following by exercises, f    Myocardial infarction (Multi) 01/03/2024    Comment on above: STEMI    Pacemaker     Personal history of other medical treatment     History of echocardiogram    Sleep apnea     Ulcer of toe of left foot (Multi) 08/02/2022    Ulcer of toe of left foot, with fat layer exposed (Multi) 06/09/2023    Unstable angina pectoris (Multi) 07/07/2022       Medication review  Medication Documentation Review Audit       Reviewed by Yessy Dudley RN (Registered Nurse) on 10/17/24 at 1239      Medication Order Taking? Sig Documenting Provider Last Dose Status   ascorbic acid " (Vitamin C) 500 mg tablet [id number]  Take 1 tablet (500 mg) by mouth once daily. Historical Provider, MD  Active   aspirin 81 mg EC tablet [id number]  TAKE 1 TABLET BY MOUTH DAILY Juan Boyer MD  Active   atorvastatin (Lipitor) 80 mg tablet [id number]  Take 1 tablet (80 mg) by mouth once daily at bedtime. Juan Boyer MD  Active   cholecalciferol, vitamin D3, (VITAMIN D3 ORAL) [id number]  Take by mouth once daily. Unknown dose Historical Provider, MD  Active   cyanocobalamin (Vitamin B-12) 100 mcg tablet [id number]  Take 1 tablet (100 mcg) by mouth once daily. Historical Provider, MD  Active   dapagliflozin propanediol (Farxiga) 10 mg [id number]  Take 1 tablet (10 mg) by mouth once daily. Juan Boyer MD  Active   docusate sodium (Colace) 100 mg capsule [id number]  Take 1 capsule (100 mg) by mouth 2 times a day. VENU Denise-CNP  Active   fluticasone (Flonase) 50 mcg/actuation nasal spray [id number]  Administer 1 spray into affected nostril(s) once daily as needed. Historical Provider, MD  Active   furosemide (Lasix) 40 mg tablet [id number]  Take 1 tablet (40 mg) by mouth once daily.   Patient taking differently: Take 1 tablet (40 mg) by mouth if needed.    Juan Boyer MD   10/12/24 2359   isosorbide mononitrate ER (Imdur) 60 mg 24 hr tablet [id number]  TAKE 1 TABLET BY MOUTH DAILY Juan Boyer MD  Active   nitroglycerin (Nitrostat) 0.4 mg SL tablet [id number]  DISSOLVE 1 TABLET UNDER THE TONGUE AS NEEDED FOR CHEST PAIN-  MAY REPEAT EVERY 5 MINUTES IF NEEDED ( MAX 3 DOSES.- IF NO RELIEF CALL 911) AYE Denise  Active   pantoprazole (ProtoNix) 40 mg EC tablet [id number]  Take 1 tablet (40 mg) by mouth once daily as needed. Do not crush, chew, or split. Historical Provider, MD  Active   sotalol (Betapace) 120 mg tablet [id number]  TAKE 1 TABLET BY MOUTH TWICE DAILY Clement Perez MD  Active   tirzepatide (Mounjaro) 12.5 mg/0.5 mL pen injector [id number]  Inject 1 pen (12.5 mg) under the  skin 1 (one) time per week. Nakita Denise, APRN-CNP  Active                    Allergies  Allergies   Allergen Reactions    Iodinated Contrast Media Hives    Penicillins Hives     Years ago       Social History  Social History     Socioeconomic History    Marital status:      Spouse name: Not on file    Number of children: Not on file    Years of education: Not on file    Highest education level: Not on file   Occupational History    Not on file   Tobacco Use    Smoking status: Former     Current packs/day: 0.00     Average packs/day: 1 pack/day for 36.8 years (36.8 ttl pk-yrs)     Types: Cigarettes     Start date: 1985     Quit date: 2021     Years since quittin.9    Smokeless tobacco: Never    Tobacco comments:     Stop  no urge to restart   Vaping Use    Vaping status: Never Used   Substance and Sexual Activity    Alcohol use: Not Currently     Alcohol/week: 3.0 standard drinks of alcohol     Types: 1 Glasses of wine, 2 Cans of beer per week     Comment: occasional    Drug use: Not Currently     Types: Marijuana    Sexual activity: Not Currently     Partners: Male     Birth control/protection: Abstinence, Post-menopausal, None   Other Topics Concern    Not on file   Social History Narrative    Not on file     Social Drivers of Health     Financial Resource Strain: Low Risk  (2023)    Overall Financial Resource Strain (CARDIA)     Difficulty of Paying Living Expenses: Not hard at all   Food Insecurity: No Food Insecurity (2023)    Hunger Vital Sign     Worried About Running Out of Food in the Last Year: Never true     Ran Out of Food in the Last Year: Never true   Transportation Needs: No Transportation Needs (2023)    PRAPARE - Transportation     Lack of Transportation (Medical): No     Lack of Transportation (Non-Medical): No   Physical Activity: Sufficiently Active (2023)    Exercise Vital Sign     Days of Exercise per Week: 5 days     Minutes of  Exercise per Session: 90 min   Stress: No Stress Concern Present (2023)    [place] [employer] - Occupational Stress Questionnaire     Feeling of Stress : Not at all   Social Connections: Moderately Isolated (2023)    Social Connection and Isolation Panel [NHANES]     Frequency of Communication with Friends and Family: More than three times a week     Frequency of Social Gatherings with Friends and Family: Three times a week     Attends Zoroastrianism Services: More than 4 times per year     Active Member of Clubs or Organizations: No     Attends Club or Organization Meetings: Never     Marital Status:    Intimate Partner Violence: Not At Risk (2023)    Humiliation, Afraid, Rape, and Kick questionnaire     Fear of Current or Ex-Partner: No     Emotionally Abused: No     Physically Abused: No     Sexually Abused: No   Housing Stability: Low Risk  (2023)    Housing Stability Vital Sign     Unable to Pay for Housing in the Last Year: No     Number of Places Lived in the Last Year: 1     Unstable Housing in the Last Year: No       Surgical History  Past Surgical History:   Procedure Laterality Date    CARDIAC CATHETERIZATION N/A 2023    Procedure: Left Heart Cath;  Surgeon: Juan Boyer MD;  Location: [facility] Cardiac Cath Lab;  Service: Cardiovascular;  Laterality: N/A;    CARDIAC CATHETERIZATION N/A 2023    Procedure: IFR (Instant Wave Free Ration);  Surgeon: Juan Boyer MD;  Location: [facility] Cardiac Cath Lab;  Service: Cardiovascular;  Laterality: N/A;     SECTION, LOW TRANSVERSE  1976  and 1979    CORONARY ANGIOPLASTY      CORONARY STENT PLACEMENT      INSERT / REPLACE / REMOVE PACEMAKER      OTHER SURGICAL HISTORY  2022    Cardiac catheterization with stent placement    OTHER SURGICAL HISTORY  2022    Cardioverter defibrillator insertion       Physical Exam:  GENERAL:  Patient is awake, alert, and oriented to person  place and time.  Patient appears well nourished and well kept.  Affect Calm, Not Acutely Distressed.  HEENT:  Normocephalic, Atraumatic, EOMI  CARDIOVASCULAR:  Hemodynamically stable.  RESPIRATORY:  Normal respirations with unlabored breathing.  Extremity: Left knee examination shows skin is intact.  There is no erythema or warmth.  1-2+ hemarthrosis.  Can flex the right knee to 100 degrees with pain.  Full extension at 0 degrees.  Pain over the medial joint line.  Pain over the lateral joint line.  There is no pain over the patellar or quadricep tendon.  Pain over the proximal tibia.  No pain over the popliteal fossa.  Pain over the patella.  Negative valgus stress test.  Negative varus stress test.  Negative Rosario's test medially with no instability.  Negative Rosario's test laterally with no instability.  Negative Lachman's test.  Patellar and quadricep mechanism intact.  Negative anterior and posterior drawer test.  Negative patellar apprehension test.  Distal pulses are palpable, neurovascularly intact.  Walking with  significant antalgic gait.      Diagnostics: X-rays reviewed  XR knee left 1-2 views  Narrative: Interpreted By:  Richi Salmeron,   STUDY:  XR KNEE LEFT 1-2 VIEWS;  10/17/2024 2:08 pm      INDICATION:  Signs/Symptoms:fall.      COMPARISON:  None.      ACCESSION NUMBER(S):  [id number]      ORDERING CLINICIAN:  SHALOM FREITAS      FINDINGS:  No acute fracture or dislocation. Joint space narrowing and marginal  osteophyte formation in all 3 compartments of the knee,  moderate-to-severe in the patellofemoral compartment. No significant  joint effusion. Nonspecific ellipsoid soft tissue density in the  region of distal quadriceps muscle.      Impression: No acute osseous abnormality.      Suspected possible distal quadriceps intramuscular hematoma.      MACRO  None      Signed by: Richi Salmeron 10/17/2024 2:53 PM  Dictation workstation:   [id number]  Vascular US Lower Extremity Venous Duplex  Left  Preliminary Cardiology Report                 [facility]  [id number] [place] [address]      Tel [phone] Fax [phone]            Preliminary Vascular Lab Report     VASC US LOWER EXTREMITY VENOUS DUPLEX LEFT       Patient Name:     TREVON RADHA Baptiste Physician: [id number] Le DOMINGUEZ  Study Date:       10/17/2024       Ordering Provider: [id number] SHALOM FREITAS  MRN/PID:          [id number]         Fellow:  Accession#:       [id number]     Technologist:      Tara Madrid RVT,                                                        [facility]  YOB: 1955         Technologist 2:  Gender:           F                Encounter#:        [id number]  Admission Status: Emergency        Location           [facility]                                     Performed:       Diagnosis/ICD: Pain in left leg-M79.605  Indication:    Limb pain.  CPT Codes:     58014 Peripheral venous duplex scan for DVT Limited       PRELIMINARY CONCLUSIONS:     Right Lower Venous: The right common femoral vein demonstrates normal spontaneous and respirophasic flow.  Left Lower Venous: No evidence of acute deep vein thrombus visualized in the left lower extremity.     Imaging & Doppler Findings:     Right        Flow  CFV   Spontaneous/Phasic       Left                  Compress Thrombus        Flow  Distal External Iliac            None   Spontaneous/Phasic  CFV                     Yes      None   Spontaneous/Phasic  PFV                     Yes      None  FV Proximal             Yes      None   Spontaneous/Phasic  FV Mid                  Yes      None  FV Distal               Yes      None  Popliteal               Yes      None   Spontaneous/Phasic  Peroneal                Yes      None  PTV                     Yes      None       VASCULAR PRELIMINARY REPORT  completed by Tara Madrid RVT on 10/17/2024 at 1:46:58 PM       ** Final **      Procedure:  "None    Assessment:   1.  Left knee contusion, with possible insufficiency fracture  2.  Left knee hemarthrosis    Plan: Antonia \"Patricia\" presents today for initial evaluation for acute left knee injury sustained a week ago after a fall.  We did recommended a stat CT scan of the left knee to evaluate for fracture, as she is unable to obtain MRI due to her pacemaker.  Unable to aspirate the hematoma at is been over a week and has solidified and unable to aspirate today.  We did give her a walker today and recommended nonweightbearing until the CT scan follow-up.  Tylenol codeine was sent to the pharmacy.      No orders of the defined types were placed in this encounter.     At the conclusion of the visit there were no further questions by the patient/family regarding their plan of care.  Patient was instructed to call or return with any issues, questions, or concerns regarding their injury and/or treatment plan described above.     10/18/24 at 9:23 AM - Samy Horvath MD  Scribe Attestation  By signing my name below, I, Scotty Ghazal, Scrib[place]ito   attest that this documentation has been prepared under the direction and in the presence of Samy Horvath MD.    Office: [phone]    This note was prepared using voice recognition software.  The details of this note are correct and have been reviewed, and corrected to the best of my ability.  Some grammatical errors may persist related to the Dragon software.  "

## 2024-10-22 ENCOUNTER — APPOINTMENT (OUTPATIENT)
Dept: ORTHOPEDIC SURGERY | Facility: CLINIC | Age: 69
End: 2024-10-22
Payer: COMMERCIAL

## 2024-10-24 ENCOUNTER — APPOINTMENT (OUTPATIENT)
Dept: ORTHOPEDIC SURGERY | Facility: CLINIC | Age: 69
End: 2024-10-24
Payer: COMMERCIAL

## 2024-10-24 DIAGNOSIS — T14.8XXA HEMATOMA: ICD-10-CM

## 2024-10-24 DIAGNOSIS — S80.02XA CONTUSION OF LEFT KNEE, INITIAL ENCOUNTER: ICD-10-CM

## 2024-10-24 DIAGNOSIS — S80.02XA TRAUMATIC HEMATOMA OF KNEE, LEFT, INITIAL ENCOUNTER: ICD-10-CM

## 2024-10-24 DIAGNOSIS — S70.10XA: ICD-10-CM

## 2024-10-24 PROCEDURE — 1123F ACP DISCUSS/DSCN MKR DOCD: CPT | Performed by: INTERNAL MEDICINE

## 2024-10-24 PROCEDURE — 1159F MED LIST DOCD IN RCRD: CPT | Performed by: INTERNAL MEDICINE

## 2024-10-24 PROCEDURE — 99213 OFFICE O/P EST LOW 20 MIN: CPT | Performed by: INTERNAL MEDICINE

## 2024-10-24 PROCEDURE — 3048F LDL-C <100 MG/DL: CPT | Performed by: INTERNAL MEDICINE

## 2024-10-24 PROCEDURE — 1036F TOBACCO NON-USER: CPT | Performed by: INTERNAL MEDICINE

## 2024-10-24 PROCEDURE — 3044F HG A1C LEVEL LT 7.0%: CPT | Performed by: INTERNAL MEDICINE

## 2024-10-24 NOTE — PROGRESS NOTES
"      CC:   Chief Complaint   Patient presents with    Left Knee - Pain     Contusion/ hemarthrosis  Ct review        HPI: Antonia \"Patricia\" is a 69 y.o. female presents today for CT scan review.  She states that she is doing better.       Review of Systems   GENERAL: Negative for malaise, significant weight loss, fever  MUSCULOSKELETAL: See HPI  NEURO:  Negative for numbness / tingling     Past Medical History  Past Medical History:   Diagnosis Date    Arterial stent thrombosis, initial encounter ([facility])     7 stents - pt has defiberilator    CHF (congestive heart failure) November 16 2021    Coronary artery disease     Decreased range of motion (ROM) of shoulder 12/13/2023    Diabetes (Multi)     Heart disease 11/16/2021    Heart failure     Hypertension     Left shoulder pain 02/19/2023    Ddx includes adhesive capsulitis. AC joint osteoarthritis rotator cuff tendinitis, biceps tendinitis, and deltoid tendinitis given the various positive physical exam findings. May be related to upper arm usage during cardiac rehab. Limited treatment options given DM and use of blood thinners. Provided AAOS shoulder exercises and stretches. Recommend using of heating pads, following by exercises, f    Myocardial infarction (Multi) 01/03/2024    Comment on above: STEMI    Pacemaker     Personal history of other medical treatment     History of echocardiogram    Sleep apnea     Ulcer of toe of left foot (Multi) 08/02/2022    Ulcer of toe of left foot, with fat layer exposed (Multi) 06/09/2023    Unstable angina pectoris (Multi) 07/07/2022       Medication review  Medication Documentation Review Audit       Reviewed by Gisselle Corbin MA (Medical Assistant) on 10/18/24 at 0933      Medication Order Taking? Sig Documenting Provider Last Dose Status   ascorbic acid (Vitamin C) 500 mg tablet [id number] No Take 1 tablet (500 mg) by mouth once daily. Historical Provider, MD Taking Active   aspirin 81 mg EC tablet [id number]  TAKE 1 " TABLET BY MOUTH DAILY Juan Boyer MD  Active   atorvastatin (Lipitor) 80 mg tablet [id number] No Take 1 tablet (80 mg) by mouth once daily at bedtime. Juan Boyer MD Taking Active   cholecalciferol, vitamin D3, (VITAMIN D3 ORAL) [id number] No Take by mouth once daily. Unknown dose Historical Provider, MD Taking Active   cyanocobalamin (Vitamin B-12) 100 mcg tablet [id number] No Take 1 tablet (100 mcg) by mouth once daily. Historical Provider, MD Taking Active   dapagliflozin propanediol (Farxiga) 10 mg [id number] No Take 1 tablet (10 mg) by mouth once daily. Juan Boyer MD Taking Active   docusate sodium (Colace) 100 mg capsule [id number] No Take 1 capsule (100 mg) by mouth 2 times a day. Nakita Denise APRN-CNP Taking Active   fluticasone (Flonase) 50 mcg/actuation nasal spray [id number] No Administer 1 spray into affected nostril(s) once daily as needed. Historical Provider, MD Taking Active   furosemide (Lasix) 40 mg tablet [id number] No Take 1 tablet (40 mg) by mouth once daily.   Patient taking differently: Take 1 tablet (40 mg) by mouth if needed.    Juan Boyer MD Taking  10/12/24 2359   isosorbide mononitrate ER (Imdur) 60 mg 24 hr tablet [id number] No TAKE 1 TABLET BY MOUTH DAILY Juan Boyer MD Taking Active   nitroglycerin (Nitrostat) 0.4 mg SL tablet [id number] No DISSOLVE 1 TABLET UNDER THE TONGUE AS NEEDED FOR CHEST PAIN-  MAY REPEAT EVERY 5 MINUTES IF NEEDED ( MAX 3 DOSES.- IF NO RELIEF CALL 911) Nakita Denise APRN-CNP Taking Active   pantoprazole (ProtoNix) 40 mg EC tablet [id number] No Take 1 tablet (40 mg) by mouth once daily as needed. Do not crush, chew, or split. Historical Provider, MD Taking Active   sotalol (Betapace) 120 mg tablet [id number]  TAKE 1 TABLET BY MOUTH TWICE DAILY Clement Perez MD  Active   tirzepatide (Mounjaro) 12.5 mg/0.5 mL pen injector [id number]  Inject 1 pen (12.5 mg) under the skin 1 (one) time per week. Nakita Denise, APRN-CNP  Active                     Allergies  Allergies   Allergen Reactions    Iodinated Contrast Media Hives    Penicillins Hives     Years ago       Social History  Social History     Socioeconomic History    Marital status:      Spouse name: Not on file    Number of children: Not on file    Years of education: Not on file    Highest education level: Not on file   Occupational History    Not on file   Tobacco Use    Smoking status: Former     Current packs/day: 0.00     Average packs/day: 1 pack/day for 36.8 years (36.8 ttl pk-yrs)     Types: Cigarettes     Start date: 1985     Quit date: 2021     Years since quittin.9    Smokeless tobacco: Never    Tobacco comments:     Stop  no urge to restart   Vaping Use    Vaping status: Never Used   Substance and Sexual Activity    Alcohol use: Not Currently     Alcohol/week: 3.0 standard drinks of alcohol     Types: 1 Glasses of wine, 2 Cans of beer per week     Comment: occasional    Drug use: Not Currently     Types: Marijuana    Sexual activity: Not Currently     Partners: Male     Birth control/protection: Abstinence, Post-menopausal, None   Other Topics Concern    Not on file   Social History Narrative    Not on file     Social Drivers of Health     Financial Resource Strain: Low Risk  (2023)    Overall Financial Resource Strain (CARDIA)     Difficulty of Paying Living Expenses: Not hard at all   Food Insecurity: No Food Insecurity (2023)    Hunger Vital Sign     Worried About Running Out of Food in the Last Year: Never true     Ran Out of Food in the Last Year: Never true   Transportation Needs: No Transportation Needs (2023)    PRAPARE - Transportation     Lack of Transportation (Medical): No     Lack of Transportation (Non-Medical): No   Physical Activity: Sufficiently Active (2023)    Exercise Vital Sign     Days of Exercise per Week: 5 days     Minutes of Exercise per Session: 90 min   Stress: No Stress Concern Present (2023)     [facility] of Occupational Health - Occupational Stress Questionnaire     Feeling of Stress : Not at all   Social Connections: Moderately Isolated (2023)    Social Connection and Isolation Panel [NHANES]     Frequency of Communication with Friends and Family: More than three times a week     Frequency of Social Gatherings with Friends and Family: Three times a week     Attends Bahai Services: More than 4 times per year     Active Member of Clubs or Organizations: No     Attends Club or Organization Meetings: Never     Marital Status:    Intimate Partner Violence: Not At Risk (2023)    Humiliation, Afraid, Rape, and Kick questionnaire     Fear of Current or Ex-Partner: No     Emotionally Abused: No     Physically Abused: No     Sexually Abused: No   Housing Stability: Low Risk  (2023)    Housing Stability Vital Sign     Unable to Pay for Housing in the Last Year: No     Number of Places Lived in the Last Year: 1     Unstable Housing in the Last Year: No       Surgical History  Past Surgical History:   Procedure Laterality Date    CARDIAC CATHETERIZATION N/A 2023    Procedure: Left Heart Cath;  Surgeon: Juan Boyer MD;  Location: [facility] Cardiac Cath Lab;  Service: Cardiovascular;  Laterality: N/A;    CARDIAC CATHETERIZATION N/A 2023    Procedure: IFR (Instant Wave Free Ration);  Surgeon: Juan Boyer MD;  Location: [facility] Cardiac Cath Lab;  Service: Cardiovascular;  Laterality: N/A;     SECTION, LOW TRANSVERSE  1976  and 1979    CORONARY ANGIOPLASTY      CORONARY STENT PLACEMENT      INSERT / REPLACE / REMOVE PACEMAKER      OTHER SURGICAL HISTORY  2022    Cardiac catheterization with stent placement    OTHER SURGICAL HISTORY  2022    Cardioverter defibrillator insertion       Physical Exam:  GENERAL:  Patient is awake, alert, and oriented to person place and time.  Patient appears well nourished and well kept.  Affect Calm, Not  Acutely Distressed.  HEENT:  Normocephalic, Atraumatic, EOMI  CARDIOVASCULAR:  Hemodynamically stable.  RESPIRATORY:  Normal respirations with unlabored breathing.  Extremity: Left knee examination shows skin is intact.  There is no erythema or warmth.  1-2+ hemarthrosis.  Can flex the right knee to 110 degrees with pain.  Full extension at 0 degrees.  Pain over the medial joint line.  Pain over the lateral joint line.  There is no pain over the patellar or quadricep tendon.  Pain over the proximal tibia.  No pain over the popliteal fossa.  No pain over the patella.  Negative valgus stress test.  Negative varus stress test.  Negative Rosario's test medially with no instability.  Negative Rosario's test laterally with no instability.  Negative Lachman's test.  Patellar and quadricep mechanism intact.  Negative anterior and posterior drawer test.  Negative patellar apprehension test.  Distal pulses are palpable, neurovascularly intact.  Walking with  significant antalgic gait.       Diagnostics: CT scan reviewed  CT knee left wo IV contrast  Narrative: Interpreted By:  Joe Mendoza,   STUDY:  CT of the  left knee without intravenous contrast dated  10/18/2024.      INDICATION:  Signs/Symptoms:pain      COMPARISON:  None.      ACCESSION NUMBER(S):  [id number]      ORDERING CLINICIAN:  TRENT CONCEPCION      TECHNIQUE:  Axial CT of the   left knee was performed  without intravenous  contrast.  Sagittal and coronal 2 dimensional reformats were obtained.      FINDINGS:  OSSEOUS STRUCTURES AND JOINTS:      No fracture or dislocation is evident. There is mild tricompartmental  degenerative change of the knee. No joint effusion is evident.      SOFT TISSUES:      Atrophy is seen in the hamstrings muscle group. There is increased  density in the subcutaneous tissues superficial to the distal  quadriceps tendon patella and patellar tendon. Superficial to the  distal quadriceps tendon there is a more confluence dense  region  measuring up to approximately 1.55 x 6.15 x 6.11 cm. There is some  indistinctness with the distal quadriceps muscle belly particularly  of the vastus medialis.      Calcified atheromatous disease is seen in the visualized arterial  tree.      Impression: 1. Findings likely related to contusion of the anterior distal thigh  into proximal leg with subcutaneous hematoma superficial to the  distal quadriceps. There may be a component of intramuscular  extension of the hematoma particularly in the vastus medialis.  2. Mild degenerative change of the knee.      MACRO:  none      Signed by: Joe Mendoza 10/18/2024 3:46 PM  Dictation workstation:   [id number]  Vascular US Lower Extremity Venous Duplex Left              [facility]  [address] [place] [address]      Tel [phone] Fax [phone]       Vascular Lab Report     VASC US LOWER EXTREMITY VENOUS DUPLEX LEFT    Patient Name:     TREVON MARCHGLORIA  Reading           [id number] Le Clark MD                                       Physician:  Study Date:       10/17/2024         Ordering          [id number] SHALOM FREITAS                                       Provider:  MRN/PID:          [id number]           Fellow:  Accession#:       [id number]       Technologist:     Tara Madrid RVT,                                                         RDMS  Date of           1955 / 69      Technologist 2:  Birth/Age:        years  Gender:           F                  Encounter#:       [id number]  Admission Status: Emergency          Location          [facility]                                       Performed:       Diagnosis/ICD: Pain in left leg-M79.605  Indication:    Limb pain.  CPT Codes:     58889 Peripheral venous duplex scan for DVT Limited       CONCLUSIONS:  Right Lower Venous: The right common femoral vein demonstrates normal spontaneous and respirophasic flow.  Left Lower Venous: No evidence of acute deep vein thrombus  "visualized in the left lower extremity.     Comparison:  Compared with study from 2/19/2024, no significant change.no evidence of DVT.     Imaging & Doppler Findings:     Right        Flow  CFV   Spontaneous/Phasic       Left                  Compress Thrombus        Flow  Distal External Iliac            None   Spontaneous/Phasic  CFV                     Yes      None   Spontaneous/Phasic  PFV                     Yes      None  FV Proximal             Yes      None   Spontaneous/Phasic  FV Mid                  Yes      None  FV Distal               Yes      None  Popliteal               Yes      None   Spontaneous/Phasic  Peroneal                Yes      None  PTV                     Yes      None       [id number] Le Clark MD  Electronically signed by [id number] Le Clark MD on 10/18/2024 at 2:23:25 PM       ** Final **        Procedure: None    Assessment:  1.  Left knee contusion   2.  Left thigh contusion with hematoma    Plan: Antonia \"Patricia\" presents today for follow-up for CT scan. We discussed the CT scan review in detail today. No fractures on CT scan. She is clinically doing better.  Recommended warm compresses for hematoma, she will weight bear as tolerated and we will get her involved with physical therapy. She will follow-up in 3-4 weeks for reevaluation.     No orders of the defined types were placed in this encounter.     At the conclusion of the visit there were no further questions by the patient/family regarding their plan of care.  Patient was instructed to call or return with any issues, questions, or concerns regarding their injury and/or treatment plan described above.     10/24/24 at 4:09 PM - Samy Horvath MD  Scribe Attestation  By signing my name below, I Scotty Gloriamo, Scribe   attest that this documentation has been prepared under the direction and in the presence of Samy Horvath MD.    Office: [phone]    This note was prepared using voice recognition software.  The details of " this note are correct and have been reviewed, and corrected to the best of my ability.  Some grammatical errors may persist related to the Dragon software.

## 2024-10-29 ENCOUNTER — HOSPITAL ENCOUNTER (OUTPATIENT)
Dept: CARDIOLOGY | Facility: CLINIC | Age: 69
Discharge: HOME | End: 2024-10-29
Payer: COMMERCIAL

## 2024-10-29 DIAGNOSIS — Z95.810 PRESENCE OF AUTOMATIC (IMPLANTABLE) CARDIAC DEFIBRILLATOR: ICD-10-CM

## 2024-10-29 DIAGNOSIS — I25.5 ISCHEMIC CARDIOMYOPATHY: ICD-10-CM

## 2024-10-29 PROCEDURE — 93284 PRGRMG EVAL IMPLANTABLE DFB: CPT

## 2024-10-30 ENCOUNTER — TELEPHONE (OUTPATIENT)
Dept: CARDIOLOGY | Facility: CLINIC | Age: 69
End: 2024-10-30
Payer: COMMERCIAL

## 2024-10-30 NOTE — TELEPHONE ENCOUNTER
Patricia called this afternoon; she woke up today with sharp chest pain that radiated to her left arm. She took a nitroglycerin and the pain is now dull. She did contact the device clinic because she saw them yesterday and they advised her to call you. I did book and appointment with Margarita for next week as well. Please advise.

## 2024-11-04 ENCOUNTER — TELEPHONE (OUTPATIENT)
Dept: ORTHOPEDIC SURGERY | Facility: CLINIC | Age: 69
End: 2024-11-04
Payer: COMMERCIAL

## 2024-11-04 NOTE — TELEPHONE ENCOUNTER
PT. LVM stating her PT order was cancelled she needs another placed she stated she wants to go to [place] because [place] is to far for her.   She can reach at [phone]

## 2024-11-05 DIAGNOSIS — S80.02XA TRAUMATIC HEMATOMA OF KNEE, LEFT, INITIAL ENCOUNTER: ICD-10-CM

## 2024-11-05 DIAGNOSIS — S80.02XA CONTUSION OF LEFT KNEE, INITIAL ENCOUNTER: ICD-10-CM

## 2024-11-05 NOTE — TELEPHONE ENCOUNTER
Attempted to call patient. Patient did not answer so I LVM that a new PT order was put in for her.

## 2024-11-06 ENCOUNTER — OFFICE VISIT (OUTPATIENT)
Dept: CARDIOLOGY | Facility: CLINIC | Age: 69
End: 2024-11-06
Payer: COMMERCIAL

## 2024-11-06 VITALS
BODY MASS INDEX: 26.95 KG/M2 | DIASTOLIC BLOOD PRESSURE: 64 MMHG | OXYGEN SATURATION: 98 % | HEART RATE: 88 BPM | WEIGHT: 167 LBS | SYSTOLIC BLOOD PRESSURE: 108 MMHG

## 2024-11-06 DIAGNOSIS — R07.9 CHEST PAIN, UNSPECIFIED TYPE: ICD-10-CM

## 2024-11-06 DIAGNOSIS — I25.5 ISCHEMIC CARDIOMYOPATHY: Primary | ICD-10-CM

## 2024-11-06 DIAGNOSIS — I25.10 CORONARY ARTERY DISEASE INVOLVING NATIVE CORONARY ARTERY OF NATIVE HEART WITHOUT ANGINA PECTORIS: ICD-10-CM
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
## 2024-11-08 PROCEDURE — RXMED WILLOW AMBULATORY MEDICATION CHARGE

## 2024-11-11 PROBLEM — I50.21 ACUTE SYSTOLIC (CONGESTIVE) HEART FAILURE: Status: ACTIVE | Noted: 2024-11-11

## 2024-11-14 ENCOUNTER — PHARMACY VISIT (OUTPATIENT)
Dept: PHARMACY | Facility: CLINIC | Age: 69
End: 2024-11-14
Payer: COMMERCIAL

## 2024-11-14 ENCOUNTER — OFFICE VISIT (OUTPATIENT)
Dept: ORTHOPEDIC SURGERY | Facility: CLINIC | Age: 69
End: 2024-11-14
Payer: COMMERCIAL

## 2024-11-14 ENCOUNTER — APPOINTMENT (OUTPATIENT)
Dept: PHARMACY | Facility: HOSPITAL | Age: 69
End: 2024-11-14
Payer: COMMERCIAL

## 2024-11-14 DIAGNOSIS — S80.02XA TRAUMATIC HEMATOMA OF KNEE, LEFT, INITIAL ENCOUNTER: ICD-10-CM

## 2024-11-14 DIAGNOSIS — S70.10XA: ICD-10-CM

## 2024-11-14 DIAGNOSIS — E11.9 CONTROLLED TYPE 2 DIABETES MELLITUS WITHOUT COMPLICATION, WITHOUT LONG-TERM CURRENT USE OF INSULIN (MULTI): ICD-10-CM

## 2024-11-14 DIAGNOSIS — S80.02XA CONTUSION OF LEFT KNEE, INITIAL ENCOUNTER: ICD-10-CM

## 2024-11-14 PROCEDURE — 99213 OFFICE O/P EST LOW 20 MIN: CPT | Performed by: INTERNAL MEDICINE

## 2024-11-14 PROCEDURE — 1159F MED LIST DOCD IN RCRD: CPT | Performed by: INTERNAL MEDICINE

## 2024-11-14 PROCEDURE — 3048F LDL-C <100 MG/DL: CPT | Performed by: INTERNAL MEDICINE

## 2024-11-14 PROCEDURE — 1036F TOBACCO NON-USER: CPT | Performed by: INTERNAL MEDICINE

## 2024-11-14 PROCEDURE — 3044F HG A1C LEVEL LT 7.0%: CPT | Performed by: INTERNAL MEDICINE

## 2024-11-14 PROCEDURE — 1123F ACP DISCUSS/DSCN MKR DOCD: CPT | Performed by: INTERNAL MEDICINE

## 2024-11-14 NOTE — PROGRESS NOTES
"      CC:   Chief Complaint   Patient presents with    Left Knee - Follow-up     Contusion/ hemarthrosis         HPI: Antonia \"Maryjo\" is a 69 y.o. female presents today for reevaluation for left knee contusion and left thigh contusion with hematoma. She states that she is doing better and notes resolving hematoma. She states that she has not been able to get into therapy.        Review of Systems   GENERAL: Negative for malaise, significant weight loss, fever  MUSCULOSKELETAL: See HPI  NEURO:  Negative for numbness / tingling     Past Medical History  Past Medical History:   Diagnosis Date    Arterial stent thrombosis, initial encounter ([facility])     7 stents - pt has defiberilator    CHF (congestive heart failure) November 16 2021    Coronary artery disease     Decreased range of motion (ROM) of shoulder 12/13/2023    Diabetes (Multi)     Heart disease 11/16/2021    Heart failure     Hypertension     Left shoulder pain 02/19/2023    Ddx includes adhesive capsulitis. AC joint osteoarthritis rotator cuff tendinitis, biceps tendinitis, and deltoid tendinitis given the various positive physical exam findings. May be related to upper arm usage during cardiac rehab. Limited treatment options given DM and use of blood thinners. Provided AAOS shoulder exercises and stretches. Recommend using of heating pads, following by exercises, f    Myocardial infarction (Multi) 01/03/2024    Comment on above: STEMI    Pacemaker     Personal history of other medical treatment     History of echocardiogram    Sleep apnea     Ulcer of toe of left foot (Multi) 08/02/2022    Ulcer of toe of left foot, with fat layer exposed (Multi) 06/09/2023    Unstable angina pectoris (Multi) 07/07/2022       Medication review  Medication Documentation Review Audit       Reviewed by Becki Malcolm MA (Medical Assistant) on 11/14/24 at 1028      Medication Order Taking? Sig Documenting Provider Last Dose Status   ascorbic acid (Vitamin C) 500 mg tablet " [id number] No Take 1 tablet (500 mg) by mouth once daily. Historical Provider, MD Taking Active   aspirin 81 mg EC tablet [id number]  TAKE 1 TABLET BY MOUTH DAILY Juan Boyer MD  Active   atorvastatin (Lipitor) 80 mg tablet [id number] No Take 1 tablet (80 mg) by mouth once daily at bedtime. Juan Boyer MD Taking Active   cholecalciferol, vitamin D3, (VITAMIN D3 ORAL) [id number] No Take by mouth once daily. Unknown dose Historical Provider, MD Taking Active   cyanocobalamin (Vitamin B-12) 100 mcg tablet [id number] No Take 1 tablet (100 mcg) by mouth once daily. Historical Provider, MD Taking Active   dapagliflozin propanediol (Farxiga) 10 mg [id number] No Take 1 tablet (10 mg) by mouth once daily. Juan Boyer MD Taking Active   docusate sodium (Colace) 100 mg capsule [id number] No Take 1 capsule (100 mg) by mouth 2 times a day. VENU Denise-CNP Taking Active   furosemide (Lasix) 40 mg tablet [id number] No Take 1 tablet (40 mg) by mouth once daily.   Patient taking differently: Take 1 tablet (40 mg) by mouth if needed.    Juan Boyer MD Taking  24 2359   isosorbide mononitrate ER (Imdur) 60 mg 24 hr tablet [id number] No TAKE 1 TABLET BY MOUTH DAILY Juan Boyer MD Taking Active   nitroglycerin (Nitrostat) 0.4 mg SL tablet [id number] No DISSOLVE 1 TABLET UNDER THE TONGUE AS NEEDED FOR CHEST PAIN-  MAY REPEAT EVERY 5 MINUTES IF NEEDED ( MAX 3 DOSES.- IF NO RELIEF CALL 911) VENU Denise-CNP Taking Active   pantoprazole (ProtoNix) 40 mg EC tablet [id number] No Take 1 tablet (40 mg) by mouth once daily as needed. Do not crush, chew, or split. Historical Provider, MD Taking Active   sacubitriL-valsartan (Entresto) 24-26 mg tablet [id number]  Take 0.5 tablets by mouth 2 times a day. Tracy M Schwab, APRN-CNP  Active   sotalol (Betapace) 120 mg tablet [id number]  TAKE 1 TABLET BY MOUTH TWICE DAILY Clement Perez MD  Active   tirzepatide (Mounjaro) 12.5 mg/0.5 mL pen injector [id number]   Inject 1 pen (12.5 mg) under the skin 1 (one) time per week. Nakita Denise, APRN-CNP  Active                    Allergies  Allergies   Allergen Reactions    Iodinated Contrast Media Hives    Penicillins Hives     Years ago       Social History  Social History     Socioeconomic History    Marital status:      Spouse name: Not on file    Number of children: Not on file    Years of education: Not on file    Highest education level: Not on file   Occupational History    Not on file   Tobacco Use    Smoking status: Former     Current packs/day: 0.00     Average packs/day: 1 pack/day for 36.8 years (36.8 ttl pk-yrs)     Types: Cigarettes     Start date: 1985     Quit date: 2021     Years since quittin.9    Smokeless tobacco: Never    Tobacco comments:     Stop  no urge to restart   Vaping Use    Vaping status: Never Used   Substance and Sexual Activity    Alcohol use: Not Currently     Alcohol/week: 3.0 standard drinks of alcohol     Types: 1 Glasses of wine, 2 Cans of beer per week     Comment: occasional    Drug use: Not Currently     Types: Marijuana    Sexual activity: Not Currently     Partners: Male     Birth control/protection: Abstinence, Post-menopausal, None   Other Topics Concern    Not on file   Social History Narrative    Not on file     Social Drivers of Health     Financial Resource Strain: Low Risk  (2023)    Overall Financial Resource Strain (CARDIA)     Difficulty of Paying Living Expenses: Not hard at all   Food Insecurity: No Food Insecurity (2023)    Hunger Vital Sign     Worried About Running Out of Food in the Last Year: Never true     Ran Out of Food in the Last Year: Never true   Transportation Needs: No Transportation Needs (2023)    PRAPARE - Transportation     Lack of Transportation (Medical): No     Lack of Transportation (Non-Medical): No   Physical Activity: Sufficiently Active (2023)    Exercise Vital Sign     Days of Exercise per  Week: 5 days     Minutes of Exercise per Session: 90 min   Stress: No Stress Concern Present (2023)    [employer] - Occupational Stress Questionnaire     Feeling of Stress : Not at all   Social Connections: Moderately Isolated (2023)    Social Connection and Isolation Panel [NHANES]     Frequency of Communication with Friends and Family: More than three times a week     Frequency of Social Gatherings with Friends and Family: Three times a week     Attends Taoist Services: More than 4 times per year     Active Member of Clubs or Organizations: No     Attends Club or Organization Meetings: Never     Marital Status:    Intimate Partner Violence: Not At Risk (2023)    Humiliation, Afraid, Rape, and Kick questionnaire     Fear of Current or Ex-Partner: No     Emotionally Abused: No     Physically Abused: No     Sexually Abused: No   Housing Stability: Low Risk  (2023)    Housing Stability Vital Sign     Unable to Pay for Housing in the Last Year: No     Number of Places Lived in the Last Year: 1     Unstable Housing in the Last Year: No       Surgical History  Past Surgical History:   Procedure Laterality Date    CARDIAC CATHETERIZATION N/A 2023    Procedure: Left Heart Cath;  Surgeon: Juan Boyer MD;  Location: [facility] Cardiac Cath Lab;  Service: Cardiovascular;  Laterality: N/A;    CARDIAC CATHETERIZATION N/A 2023    Procedure: IFR (Instant Wave Free Ration);  Surgeon: Juan Boyer MD;  Location: [facility] Cardiac Cath Lab;  Service: Cardiovascular;  Laterality: N/A;     SECTION, LOW TRANSVERSE  1976  and 1979    CORONARY ANGIOPLASTY      CORONARY STENT PLACEMENT      INSERT / REPLACE / REMOVE PACEMAKER      OTHER SURGICAL HISTORY  2022    Cardiac catheterization with stent placement    OTHER SURGICAL HISTORY  2022    Cardioverter defibrillator insertion       Physical Exam:  GENERAL:  Patient is awake,  "alert, and oriented to person place and time.  Patient appears well nourished and well kept.  Affect Calm, Not Acutely Distressed.  HEENT:  Normocephalic, Atraumatic, EOMI  CARDIOVASCULAR:  Hemodynamically stable.  RESPIRATORY:  Normal respirations with unlabored breathing.  Extremity: Left knee examination shows skin is intact.  There is no erythema or warmth.  Trace amount of effusion.  Can flex the right knee to 130 degrees with no pain.  Full extension at 0 degrees.  No pain over the medial joint line.  No pain over the lateral joint line.  There is no pain over the patellar or quadricep tendon.  No pain over the proximal tibia.  No pain over the popliteal fossa.  No pain over the patella.  Negative valgus stress test.  Negative varus stress test.  Negative Rosario's test medially with no instability.  Negative Rosario's test laterally with no instability.  Negative Lachman's test.  Patellar and quadricep mechanism intact.  Negative anterior and posterior drawer test.  Negative patellar apprehension test.  Distal pulses are palpable, neurovascularly intact.  Walking with no significant antalgic gait.       Diagnostics: None today        Procedure: None    Assessment:   1.  Left knee contusion   2.  Left thigh contusion with hematoma    Plan: Antonia \"Maryjo\"  presents today for follow-up for left knee contusion and left thigh contusion with hematoma. She is clinically doing better, resolving hematoma. We recommended physical therapy and she will follow-up as needed.    No orders of the defined types were placed in this encounter.     At the conclusion of the visit there were no further questions by the patient/family regarding their plan of care.  Patient was instructed to call or return with any issues, questions, or concerns regarding their injury and/or treatment plan described above.     11/14/24 at 10:36 AM - Samy Horvath MD  Scribe Attestation  By signing my name below, IScotty Scribe "   attest that this documentation has been prepared under the direction and in the presence of Samy Horvath MD.    Office: [phone]    This note was prepared using voice recognition software.  The details of this note are correct and have been reviewed, and corrected to the best of my ability.  Some grammatical errors may persist related to the Dragon software.

## 2024-11-18 RX ORDER — TIRZEPATIDE 12.5 MG/.5ML
12.5 INJECTION, SOLUTION SUBCUTANEOUS
Qty: 6 ML | Refills: 1 | Status: SHIPPED | OUTPATIENT
Start: 2024-11-18

## 2024-11-18 NOTE — PROGRESS NOTES
"Pharmacy Post-Discharge Visit  Antonia Rodriguez \"Maryjo\" is a 69 y.o. female was referred to Clinical Pharmacy Team to complete a post-discharge medication optimization and monitoring visit.  The patient was referred for their diabetes, COPD and HF.    Admission Date: 2023  Discharge Date: 2023    Referring Provider: AYE Denise    Subjective   Allergies   Allergen Reactions    Iodinated Contrast Media Hives    Penicillins Hives     Years ago       [id number] - [place], [place] - [address]  [address] [address]  Phone: [phone] Fax: [phone]    [facility]  [address]  [facility] [address]  Phone: [phone] Fax: [phone]    Social History     Social History Narrative    Not on file     Notable Medication changes following discharge:  -none    Diabetes  She presents for her follow-up diabetic visit. She has type 2 diabetes mellitus. There are no hypoglycemic associated symptoms. There are no hypoglycemic complications. Current diabetic treatment includes oral agent (monotherapy) (and weekly GLP-1).     Current DM Pharmacotherapy:   -Mounjaro 12.5 mg weekly  -Farxiga 10mg daily    SECONDARY PREVENTION  - Statin? Yes  - ACE-I/ARB? No  - Aspirin? Yes    Current monitoring regimen:   Patient is using: glucometer    B to 150    Any episodes of hypoglycemia? No  Hypoglycemia awareness? No    Pertinent PMH Review:  - PMH of Pancreatitis: No  - PMH/FH of Medullary Thyroid Cancer: No  - PMH of Retinopathy: No  - PMH of Urinary Tract Infections: No    CHF Assessment    Symptom/Staging:  -Most recent ejection fraction: 55-60%  -Weight changes?: No    Guideline-Directed Medical Therapy:  -ARNI: Yes, describe: Entresto 24-26  -Beta Blocker: Yes, describe: sotalol  -MRA: Yes, describe: spironolactone 25mg   -SGLT2i: Yes, describe: farxiga 10mg daily    Secondary Prevention:  -The ASCVD Risk score (Bibiana DK, et " al., 2019) failed to calculate for the following reasons:    Risk score cannot be calculated because patient has a medical history suggesting prior/existing ASCVD   -Aspirin 81mg? yes  -Statin?: Yes, describe: atorvastatin 80mg nightly  -HTN?: Yes, describe: Entresto 24-26 bid    BP: none to report    Objective     LMP  (LMP Unknown)      LAB  Lab Results   Component Value Date    BILITOT 0.6 06/27/2024    CALCIUM 9.3 06/27/2024    CO2 29 06/27/2024     06/27/2024    CREATININE 0.88 06/27/2024    GLUCOSE 142 (H) 06/27/2024    ALKPHOS 145 (H) 06/27/2024    K 4.6 06/27/2024    PROT 6.9 06/27/2024     06/27/2024    AST 17 06/27/2024    ALT 15 06/27/2024    BUN 16 06/27/2024    ANIONGAP 12 06/27/2024    MG 2.13 10/08/2024    PHOS 4.0 07/09/2022    ALBUMIN 4.2 06/27/2024    EGFR 71 06/27/2024     Lab Results   Component Value Date    TRIG 85 06/27/2024    CHOL 101 06/27/2024    LDLCALC 37 06/27/2024    HDL 47.4 06/27/2024     Lab Results   Component Value Date    HGBA1C 6.5 (H) 10/08/2024     Current Outpatient Medications on File Prior to Visit   Medication Sig Dispense Refill    ascorbic acid (Vitamin C) 500 mg tablet Take 1 tablet (500 mg) by mouth once daily.      aspirin 81 mg EC tablet TAKE 1 TABLET BY MOUTH DAILY 90 tablet 3    atorvastatin (Lipitor) 80 mg tablet Take 1 tablet (80 mg) by mouth once daily at bedtime. 90 tablet 3    cholecalciferol, vitamin D3, (VITAMIN D3 ORAL) Take by mouth once daily. Unknown dose      cyanocobalamin (Vitamin B-12) 100 mcg tablet Take 1 tablet (100 mcg) by mouth once daily.      dapagliflozin propanediol (Farxiga) 10 mg Take 1 tablet (10 mg) by mouth once daily. 90 tablet 3    docusate sodium (Colace) 100 mg capsule Take 1 capsule (100 mg) by mouth 2 times a day. 60 capsule 5    furosemide (Lasix) 40 mg tablet Take 1 tablet (40 mg) by mouth once daily. (Patient taking differently: Take 1 tablet (40 mg) by mouth if needed.) 20 tablet 3    isosorbide mononitrate ER  (Imdur) 60 mg 24 hr tablet TAKE 1 TABLET BY MOUTH DAILY 90 tablet 3    nitroglycerin (Nitrostat) 0.4 mg SL tablet DISSOLVE 1 TABLET UNDER THE TONGUE AS NEEDED FOR CHEST PAIN-  MAY REPEAT EVERY 5 MINUTES IF NEEDED ( MAX 3 DOSES.- IF NO RELIEF CALL 911) 25 tablet 3    pantoprazole (ProtoNix) 40 mg EC tablet Take 1 tablet (40 mg) by mouth once daily as needed. Do not crush, chew, or split.      sacubitriL-valsartan (Entresto) 24-26 mg tablet Take 0.5 tablets by mouth 2 times a day. 90 tablet 3    sotalol (Betapace) 120 mg tablet TAKE 1 TABLET BY MOUTH TWICE DAILY 180 tablet 2    tirzepatide (Mounjaro) 12.5 mg/0.5 mL pen injector Inject 1 pen (12.5 mg) under the skin 1 (one) time per week. 6 mL 0     No current facility-administered medications on file prior to visit.     Assessment/Plan   Patient reports she is still doing well with Mounjaro. Reports weight loss is down to 164 lbs and readings have been mostly in goal. Will continue patient at current dose and follow up in 12 weeks.    Plan:  CONTINUE Mounjaro to 12.5 mg weekly  Follow up in 12 weeks to review [facility], [facility]    Verbal consent to manage patient's drug therapy was obtained from the patient. They were informed they may decline to participate or withdraw from participation in pharmacy services at any time.

## 2024-11-26 ENCOUNTER — TELEPHONE (OUTPATIENT)
Dept: CARDIOLOGY | Facility: CLINIC | Age: 69
End: 2024-11-26

## 2024-11-26 ENCOUNTER — EVALUATION (OUTPATIENT)
Dept: PHYSICAL THERAPY | Facility: CLINIC | Age: 69
End: 2024-11-26
Payer: COMMERCIAL

## 2024-11-26 DIAGNOSIS — M25.562 ACUTE PAIN OF LEFT KNEE: ICD-10-CM

## 2024-11-26 DIAGNOSIS — M25.562 LEFT KNEE PAIN: Primary | ICD-10-CM

## 2024-11-26 DIAGNOSIS — R26.81 UNSTEADINESS ON FEET: ICD-10-CM

## 2024-11-26 DIAGNOSIS — M62.81 GENERALIZED MUSCLE WEAKNESS: ICD-10-CM

## 2024-11-26 DIAGNOSIS — S80.02XA TRAUMATIC HEMATOMA OF KNEE, LEFT, INITIAL ENCOUNTER: ICD-10-CM

## 2024-11-26 PROCEDURE — 97161 PT EVAL LOW COMPLEX 20 MIN: CPT | Mod: GP

## 2024-11-26 ASSESSMENT — ENCOUNTER SYMPTOMS
DEPRESSION: 0
LOSS OF SENSATION IN FEET: 1
OCCASIONAL FEELINGS OF UNSTEADINESS: 1

## 2024-11-26 NOTE — TELEPHONE ENCOUNTER
"Patient called office stating she had a few episodes of \"pounding heart\" today. She said she took nitroglycerin and the heart beats \"quieted down\". I advised patient that nitroglycerin is indicated for chest pain, not palpitations. Patient then stated that she was also having chest pain and tightness. Advised patient to go to ED for evaluation. Patient states she does not want to go to ED, but she will think about it. Dr. Boyer notified.    "

## 2024-11-26 NOTE — PROGRESS NOTES
"Physical Therapy Evaluation and Treatment      Patient Name: Antonia Rodriguez \"Maryjo\"  MRN: [id number]  Today's Date: 11/26/2024  Visit #1  Time Calculation  Start Time: 0907  Stop Time: 1002  Time Calculation (min): 55 min    Insurance:  Info: 2024 20% COINS, 240 DED (MET) 8850 OOP MAX, MC 90 DAY 2/26/2025, VS MED NEC, NO AUTH   Visit Limit: Med Nec, 90 MC  Co-Pay: $20.00    Assessment:  Patient is a 69 year old F presenting to OP PT for knee pain following a fall. Pt presents w/multiple impairments including but not limited to; decreased LE ROM, decreased LE strength/endurance, decreased static and dynamic balance, fear of falling and decreased safety awareness. Pt demo's multiple and inconsistent gait deviations including significant unsteadiness and path deviations to left. Standardized 6MWT test, FGA, mCTSIB, and 5xSTS indicate pt is at an increased risk of falling. Pt is easily distractible during functional mobility and balance testing causing more pronounced unsteadiness, requiring frequent redirection to task. Based on these findings patient will benefit from skilled physical therapy to improve listed impairments and decrease risk of falling.      Patient with the following impairments: decreased muscle performance, decreased ROM, decreased activity tolerance, pain, participation restrictions, impaired balance/gait, and difficulty with ADL completion    Patient's response to session: Increased knowledge and understanding    Plan:   Cont PT POC emphasis on LE strength/endurance, and dynamic balance to decrease fall risk.  ^Repeat 6MWTs, cont STS, further assess hip girdle strength. Add obstacle navigation d/t L sided path deviations/preference.      Current Problem:   1. Left knee pain  Referral to Physical Therapy      2. Unsteadiness on feet        3. Generalized muscle weakness        4. Traumatic hematoma of knee, left, initial encounter [S80.02XA]        5. Acute pain of left knee [M25.562]      " "      Subjective   Patient is a 69 year old F presenting to OP PT knee pain following a fall. This started about 1 month ago after falling and landing on her L knee. Pt reports she fell about a month ago and she bruised her knee. Pt reports she went to her doctor for it because it swelled up pretty bad. She found out she had a hematoma on the bone and muscle. Pt reports the orthopedic MD was unable to drain it bc it was several weeks out. Pt reports she does water aerobics 3 days a week, but her legs feel like they are really weak. Pt reports she is having most difficulty w/balance, pt reports she feels when she is walking her legs feel like they start to give out. Pt reports when she walks for a long period of time she occasionally will also get random quick periods of lightheadedness. Pt reports she has had 2 major falls within the last year and both instances her legs just gave out after about a half hour. Pt reports someone told her she has neuropathy and her sx seem to worsen while she is walking, sometimes sometimes to the point of cramping in her legs.      Pt describes knee pain as soreness.    Pain is worse w/exercising, and walking. Pain is better with rest. Patient denies numbness/tingling and falls.     Pain Rating: (Numeric Pain Scale: #/10)  Current: 4  Best: 0  Worst: 6    Patient Goal: \"Get a little stronger in my legs\"          Objective   Hip ROM (L/R)  Flexion: 125°/125°  Abduction: 45°/45°  Extension: 10°/10°  External Rotation: 45°/45°  Internal rotation: 45°/45°    Knee ROM (L/R)  Extension: 0°/0°  Flexion: 119°/135°    Specific Lower Extremity MMT (L/R)  Iliopsoas: 4/5, 4/5  Gluteals (prone): 4-/5, 4-/5  Abductors: 3+/5, 3+/5  Hamstrings: 4/5, 4/5  Quads: 4-/5, 4/5        Additional Palpable Tenderness/Trigger Points: No palpable tenderness/trigger points reproduced at this time.   Swelling: No significant observable swelling noted at this time.     Dermatomes: decreased sensation distally " subjectively     Gait: Lateral path deviations to L,   Outcome Measures:    LEFS: 41/80    5xSTS:  26sec, FGA:  10/30, and 6MWT: 261m, 859ft, .725m/s, 3 standing rest breaks  mCTSIB: 38/120 (ea condition 20sec, 2sec, 10sec, 6sec)    Treatments:    HEP:  Access Code: [email or web address]  URL: [email or web address]  Date: 11/26/2024  Prepared by: Elijah Marcus    Exercises  - Seated Long Arc Quad  - 1 x daily - 7 x weekly - 3 sets - 15 reps  - Sit to Stand  - 1 x daily - 7 x weekly - 3 sets - 10 reps  - Heel Raises with Counter Support  - 1 x daily - 7 x weekly - 3 sets - 10 reps  - Corner Balance Feet Together With Eyes Open  - 1 x daily - 7 x weekly - 1 sets - 3 reps - 1minute hold  ^  Education and discussion on HEP and treatment regarding the benefits related to current condition, POC, pathophysiology, and precautions        Goals:  By Discharge patient will demo:  [place] in HEP  No falls during POC  Improvement in the following outcome measures to decrease risk of falls:  FGA >/= 4pts consistent w/MCID  5xSTS </= 12seconds  mCTSIB = 120/120  Gait speed during 6MWT >/= .8m/s     Patient will improve Lower Extremity Functional Scale score to meet minimal detectable change of improvement to improve performance of ADLs. (9pts)    Patient will be independent with home exercise program for proper self-management of condition.    Patient will improve pain free active range of motion in deficit areas for ADL completion.

## 2024-12-03 ENCOUNTER — TREATMENT (OUTPATIENT)
Dept: PHYSICAL THERAPY | Facility: CLINIC | Age: 69
End: 2024-12-03
Payer: COMMERCIAL

## 2024-12-03 DIAGNOSIS — R26.81 UNSTEADINESS ON FEET: ICD-10-CM

## 2024-12-03 DIAGNOSIS — M25.562 ACUTE PAIN OF LEFT KNEE: Primary | ICD-10-CM

## 2024-12-03 DIAGNOSIS — M62.81 GENERALIZED MUSCLE WEAKNESS: ICD-10-CM

## 2024-12-03 PROCEDURE — 97112 NEUROMUSCULAR REEDUCATION: CPT | Mod: GP

## 2024-12-03 PROCEDURE — 97530 THERAPEUTIC ACTIVITIES: CPT | Mod: GP

## 2024-12-03 NOTE — PROGRESS NOTES
"Physical Therapy Treatment      Patient Name: Antonia Rodriguez \"Maryjo\"  MRN: [id number]  Today's Date: 12/3/2024  Visit #: 2  Insurance: 2024 20% COINS, 240 DED (MET) 8850 OOP MAX, MC 90 DAY 2/26/2025, VS MED NEC, NO AUTH   Visit Limit: Med Nec, 90 MC  Co-Pay: $20.00  Time Calculation  Start Time: 0900  Stop Time: 0943  Time Calculation (min): 43 min    1. Acute pain of left knee        2. Unsteadiness on feet        3. Generalized muscle weakness            ASSESSMENT:  Session focusing on LE endurance and static/dynamic balance, pt has fair tolerance. Pt cont to be easily distractible which causes profound unsteadiness and lateral path deviations while performing tasks throughout session. Pt has fleeting and inconsistent unsteadiness, and LE fatigue requiring UE support/standing rest throughout tasks. She additionally experiences fleeting lightheadedness that resolves nearly immediately after onset. Pt cont to be highly challenged by LE endurance and w/static/dynamic balance putting her at a higher risk for falls. Pt to cont to benefit from skilled PT to improve CLOF and decrease risk of falling.       GOALS:  By Discharge patient will demo:  [place] in HEP  No falls during POC  Improvement in the following outcome measures to decrease risk of falls:  FGA >/= 4pts consistent w/MCID  5xSTS </= 12seconds  mCTSIB = 120/120  Gait speed during 6MWT >/= .8m/s      Patient will improve Lower Extremity Functional Scale score to meet minimal detectable change of improvement to improve performance of ADLs. (9pts)     Patient will be independent with home exercise program for proper self-management of condition.     Patient will improve pain free active range of motion in deficit areas for ADL completion.    PLAN:  Cont to progress static/dynamic balance as tolerated caden incorporating dual tasking.   Cont LE strength and endurance as tolerated. (Add sidelying ABD, cybex leg ext, leg press, leg curl.)    SUBJECTIVE:  Pt " reports she has been practicing her exercises. Pt denies falls and reports no significant changes since last session.     OBJECTIVE:    Treatments:  Neuromuscular Re-education (36791): 33 minutes  Standing Balance - 2x1min, near NBOS, EC  Alternating toe taps - 2x1min  Curb step ups - 2x10 ea LE, encouraging w/out UE support to improve dynamic balance.   Cone navigation - to fatigue x1 set - to improve dynamic balance caden w/turns  Walking w/dual tasking - pt ID words/signs going through letters of alphabet around gym    Therapeutic Activitity (08888):  10 minutes  Walking for dynamic w/up and to improve overall activity tolerance - 6min, CGA, demos path deviations mostly related to easily distractible.   STS - 2x8 - for LE strength/endurance - highly challenging  Discussed importance of taking time, and focusing when performing functional mobility d/t difficulty w/dual tasking and affects on performance of ADLs.   -SpO2: 99%, 93bpm - measured after pts fleeting lightheadedness.     HEP:  HEP:  Access Code: [email or web address]  URL: [email or web address]  Date: 11/26/2024  Prepared by: Elijah Marcus     Exercises  - Seated Long Arc Quad  - 1 x daily - 7 x weekly - 3 sets - 15 reps  - Sit to Stand  - 1 x daily - 7 x weekly - 3 sets - 10 reps  - Heel Raises with Counter Support  - 1 x daily - 7 x weekly - 3 sets - 10 reps  - Corner Balance Feet Together With Eyes Open  - 1 x daily - 7 x weekly - 1 sets - 3 reps - 1minute hold  ^  Education and discussion on HEP and treatment regarding the benefits related to current condition, POC, pathophysiology, and precautions

## 2024-12-05 ENCOUNTER — TREATMENT (OUTPATIENT)
Dept: PHYSICAL THERAPY | Facility: CLINIC | Age: 69
End: 2024-12-05
Payer: COMMERCIAL

## 2024-12-05 ENCOUNTER — APPOINTMENT (OUTPATIENT)
Dept: CARDIOLOGY | Facility: CLINIC | Age: 69
End: 2024-12-05
Payer: COMMERCIAL

## 2024-12-05 DIAGNOSIS — R26.81 UNSTEADINESS ON FEET: ICD-10-CM

## 2024-12-05 DIAGNOSIS — M62.81 GENERALIZED MUSCLE WEAKNESS: ICD-10-CM

## 2024-12-05 DIAGNOSIS — M25.562 ACUTE PAIN OF LEFT KNEE: Primary | ICD-10-CM

## 2024-12-05 PROCEDURE — 97110 THERAPEUTIC EXERCISES: CPT | Mod: GP

## 2024-12-05 PROCEDURE — 97112 NEUROMUSCULAR REEDUCATION: CPT | Mod: GP

## 2024-12-05 NOTE — PROGRESS NOTES
"Physical Therapy Treatment      Patient Name: Antonia Rodriguez \"Maryjo\"  MRN: [id number]  Today's Date: 12/5/2024  Visit #: 2  Insurance: 2024 20% COINS, 240 DED (MET) 8850 OOP MAX, MC 90 DAY 2/26/2025, VS MED NEC, NO AUTH   Visit Limit: Med Nec, 90 MC  Co-Pay: $20.00  Time Calculation  Start Time: 1446  Stop Time: 1530  Time Calculation (min): 44 min    1. Acute pain of left knee        2. Unsteadiness on feet        3. Generalized muscle weakness              ASSESSMENT:  Session cont to focus on LE endurance and static/dynamic balance, pt has fair tolerance. Pt most limited by bilateral peripheral neuropathy that subjectively worsens as fatigue sets in during activities throughout session. Pt describes subjective LE cramping/paresthesia w/fatigue, screening ankle brachial index this date to investigate potential arterial dysfunction, however, no significant findings revealed. Pt's bilateral neuropathy and tendency to become easily distracted during activities likely causing inconsistent foot placement and profound unsteadiness, LOB and lateral path deviations requiring up to ModA at times to correct and frequent seated rest. Pt cont to be highly challenged by LE endurance and w/static/dynamic balance putting her at a higher risk for falls. Pt to cont to benefit from skilled PT to improve CLOF and decrease risk of falling.       GOALS:  By Discharge patient will demo:  [place] in HEP  No falls during POC  Improvement in the following outcome measures to decrease risk of falls:  FGA >/= 4pts consistent w/MCID  5xSTS </= 12seconds  mCTSIB = 120/120  Gait speed during 6MWT >/= .8m/s      Patient will improve Lower Extremity Functional Scale score to meet minimal detectable change of improvement to improve performance of ADLs. (9pts)     Patient will be independent with home exercise program for proper self-management of condition.     Patient will improve pain free active range of motion in deficit areas for ADL " "completion.    PLAN:  Cont to progress static/dynamic balance as tolerated caden incorporating dual tasking.   Cont LE strength and endurance as tolerated. (Add sidelying ABD, cybex leg ext, leg press, leg curl.)    SUBJECTIVE:  Pt reports no significant changes since last session.     OBJECTIVE:      Treatments:  Neuromuscular Re-education (61820): 20 minutes  Standing Balance - 2x1min, near NBOS, EC  Alternating toe taps to 6\" step- 2x20  Tandem walking - 2x to fatigue, when fatigue sets in pt begins to demo increasing instability and LE weakness.     Therapeutic Exercise (16011):  10 minutes  Walking for dynamic w/up and to improve overall activity tolerance - 6min, CGA, demos path deviations mostly related to easily distractible. X1 standing rest.   Cybex Leg Press - 3x10, 50lbs  Standing Heel Raise - 3x15  Seated Lumbar Extension - x10 - for lumbar mobility and to assess LE paresthesia/cramping  Seated Lumbar Flexion - x10 - for lumbar mobility and to assess LE paresthesia/cramping  AFSHAN = 1.02  UE: Supine, RUE automatic method: 123/70mmHg, 92bpm  Ankle: Supine, RLE automatic method: 126/58mmHg, 81bpm    HEP:  HEP:  Access Code: [email or web address]  URL: [email or web address]  Date: 11/26/2024  Prepared by: Elijah Marcus     Exercises  - Seated Long Arc Quad  - 1 x daily - 7 x weekly - 3 sets - 15 reps  - Sit to Stand  - 1 x daily - 7 x weekly - 3 sets - 10 reps  - Heel Raises with Counter Support  - 1 x daily - 7 x weekly - 3 sets - 10 reps  - Corner Balance Feet Together With Eyes Open  - 1 x daily - 7 x weekly - 1 sets - 3 reps - 1minute hold  ^  Education and discussion on HEP and treatment regarding the benefits related to current condition, POC, pathophysiology, and precautions      "

## 2024-12-09 PROCEDURE — RXMED WILLOW AMBULATORY MEDICATION CHARGE

## 2024-12-10 ENCOUNTER — TREATMENT (OUTPATIENT)
Dept: PHYSICAL THERAPY | Facility: CLINIC | Age: 69
End: 2024-12-10
Payer: COMMERCIAL

## 2024-12-10 DIAGNOSIS — R26.81 UNSTEADINESS ON FEET: ICD-10-CM

## 2024-12-10 DIAGNOSIS — M25.562 ACUTE PAIN OF LEFT KNEE: Primary | ICD-10-CM

## 2024-12-10 DIAGNOSIS — M62.81 GENERALIZED MUSCLE WEAKNESS: ICD-10-CM

## 2024-12-10 PROCEDURE — 97110 THERAPEUTIC EXERCISES: CPT | Mod: GP

## 2024-12-10 PROCEDURE — 97112 NEUROMUSCULAR REEDUCATION: CPT | Mod: GP

## 2024-12-10 NOTE — PROGRESS NOTES
"Physical Therapy Treatment      Patient Name: Antonia Rodriguez \"Maryjo\"  MRN: [id number]  Today's Date: 12/10/2024  Visit #: 4  Insurance: 2024 20% COINS, 240 DED (MET) 8850 OOP MAX, MC 90 DAY 2/26/2025, VS MED NEC, NO AUTH   Visit Limit: Med Nec, 90 MC  Co-Pay: $20.00  Time Calculation  Start Time: 0903  Stop Time: 0943  Time Calculation (min): 40 min    1. Acute pain of left knee        2. Unsteadiness on feet        3. Generalized muscle weakness              ASSESSMENT:  Today's session focused on strength, gait, and balance. Patient demonstrated fair tolerance to session this date, she cont to be most limited d/t bilateral neuropathy and her being easily distractible causing inconsistent foot placement and unsteadiness. She additionally demo's inconsistent LE weakness throughout session. During STS pt somewhat randomly fails to ascend entirely reporting fatigue but then is able to cont remaining reps to finish set. They are demonstrating fair progress in skilled rehabilitation at this time, caden w/static balance progressions. They are still limited by decreased muscle performance, decreased activity tolerance, impaired balance/gait, and difficulty with ADL completion, and increased risk of falling. Patient continues to be a good candidate for skilled PT in order to further address listed impairments. All questions answered.        GOALS:  By Discharge patient will demo:  [place] in HEP  No falls during POC  Improvement in the following outcome measures to decrease risk of falls:  FGA >/= 4pts consistent w/MCID  5xSTS </= 12seconds  mCTSIB = 120/120  Gait speed during 6MWT >/= .8m/s      Patient will improve Lower Extremity Functional Scale score to meet minimal detectable change of improvement to improve performance of ADLs. (9pts)     Patient will be independent with home exercise program for proper self-management of condition.     Patient will improve pain free active range of motion in deficit areas for " ADL completion.    PLAN:  Cont to progress static/dynamic balance as tolerated caden incorporating dual tasking.   Cont LE strength and endurance as tolerated. (Add sidelying ABD, cybex leg ext, leg press, leg curl.)    SUBJECTIVE:  Pt reports no significant changes since last session. She feels she is getting better.    OBJECTIVE:    Treatments:  Neuromuscular Re-education (43697): 26 minutes  Standing Balance - 3x1min, Airex, WBOS, x1 EO, x2 EC  Tandem Balance - 2x1min, Firm, EO.   Lateral Tab Step Overs - x4 hurdles -1 set to fatigue  Fwd Tab Step Overs, step too - x4 hurdles - 1 set to fatigue  Fwd Tab Step Overs -20' length with x2 hurdles attempting to perform step over in stride - 1 set to fatigue  Walking w/tray carry and ball balance - to improve motor dual tasking- 100'bouts,  1 set to fatigue, CGA for safety.       Therapeutic Exercise (06935):  14 minutes  Walking for dynamic w/up and to improve overall activity tolerance - 4min, CGA, demos path deviations mostly related to easily distractible. X1 standing rest, stopped at 4 d/t SOB/fatigue.   SpO2 = 98%, 96bpm  STS - 2x10 - SBA for safety d/t inconsistent unsteadiness and failed reps.   Curb Step - 1x10 ea LE w/out UE support. - for LE strength and dynamic balance for functional mobility.       HEP:  HEP:  Access Code: [email or web address]  URL: [email or web address]  Date: 11/26/2024  Prepared by: Elijah Marcus     Exercises  - Seated Long Arc Quad  - 1 x daily - 7 x weekly - 3 sets - 15 reps  - Sit to Stand  - 1 x daily - 7 x weekly - 3 sets - 10 reps  - Heel Raises with Counter Support  - 1 x daily - 7 x weekly - 3 sets - 10 reps  - Corner Balance Feet Together With Eyes Open  - 1 x daily - 7 x weekly - 1 sets - 3 reps - 1minute hold  ^  Education and discussion on HEP and treatment regarding the benefits related to current condition, POC, pathophysiology, and precautions

## 2024-12-12 ENCOUNTER — PHARMACY VISIT (OUTPATIENT)
Dept: PHARMACY | Facility: CLINIC | Age: 69
End: 2024-12-12
Payer: COMMERCIAL

## 2024-12-12 ENCOUNTER — APPOINTMENT (OUTPATIENT)
Dept: CARDIOLOGY | Facility: CLINIC | Age: 69
End: 2024-12-12
Payer: COMMERCIAL

## 2024-12-12 VITALS
BODY MASS INDEX: 26.2 KG/M2 | DIASTOLIC BLOOD PRESSURE: 70 MMHG | HEART RATE: 82 BPM | HEIGHT: 66 IN | OXYGEN SATURATION: 96 % | WEIGHT: 163 LBS | SYSTOLIC BLOOD PRESSURE: 108 MMHG

## 2024-12-12 DIAGNOSIS — I47.29 PAROXYSMAL VENTRICULAR TACHYCARDIA (MULTI): ICD-10-CM

## 2024-12-12 DIAGNOSIS — Z95.810 PRESENCE OF AUTOMATIC CARDIOVERTER/DEFIBRILLATOR (AICD): Primary | ICD-10-CM

## 2024-12-12 DIAGNOSIS — I44.7 LBBB (LEFT BUNDLE BRANCH BLOCK): ICD-10-CM

## 2024-12-12 DIAGNOSIS — I25.5 ISCHEMIC CARDIOMYOPATHY: ICD-10-CM

## 2024-12-12 DIAGNOSIS — I10 PRIMARY HYPERTENSION: ICD-10-CM

## 2024-12-12 PROCEDURE — 3078F DIAST BP <80 MM HG: CPT | Performed by: STUDENT IN AN ORGANIZED HEALTH CARE EDUCATION/TRAINING PROGRAM

## 2024-12-12 PROCEDURE — 1036F TOBACCO NON-USER: CPT | Performed by: STUDENT IN AN ORGANIZED HEALTH CARE EDUCATION/TRAINING PROGRAM

## 2024-12-12 PROCEDURE — 93000 ELECTROCARDIOGRAM COMPLETE: CPT | Performed by: STUDENT IN AN ORGANIZED HEALTH CARE EDUCATION/TRAINING PROGRAM

## 2024-12-12 PROCEDURE — 1123F ACP DISCUSS/DSCN MKR DOCD: CPT | Performed by: STUDENT IN AN ORGANIZED HEALTH CARE EDUCATION/TRAINING PROGRAM

## 2024-12-12 PROCEDURE — 3008F BODY MASS INDEX DOCD: CPT | Performed by: STUDENT IN AN ORGANIZED HEALTH CARE EDUCATION/TRAINING PROGRAM

## 2024-12-12 PROCEDURE — 3044F HG A1C LEVEL LT 7.0%: CPT | Performed by: STUDENT IN AN ORGANIZED HEALTH CARE EDUCATION/TRAINING PROGRAM

## 2024-12-12 PROCEDURE — 3048F LDL-C <100 MG/DL: CPT | Performed by: STUDENT IN AN ORGANIZED HEALTH CARE EDUCATION/TRAINING PROGRAM

## 2024-12-12 PROCEDURE — 1159F MED LIST DOCD IN RCRD: CPT | Performed by: STUDENT IN AN ORGANIZED HEALTH CARE EDUCATION/TRAINING PROGRAM

## 2024-12-12 PROCEDURE — 3074F SYST BP LT 130 MM HG: CPT | Performed by: STUDENT IN AN ORGANIZED HEALTH CARE EDUCATION/TRAINING PROGRAM

## 2024-12-12 PROCEDURE — 99214 OFFICE O/P EST MOD 30 MIN: CPT | Performed by: STUDENT IN AN ORGANIZED HEALTH CARE EDUCATION/TRAINING PROGRAM

## 2024-12-12 NOTE — PROGRESS NOTES
"    Cardiac Electrophysiology Office Visit     Referred by Dr. Chatterjee ref. provider found for   No chief complaint on file.    HPI:  Antonia Rodriguez is a 69 y.o. year old female patient with h/o  h/o ICM s/p CRTD, HTN, DM, HLD, CAD s/p PCI (Nov 2021) presenting today for follow up    Pt reports doing well, denies any episode of chest pain, shortness of breath palpitations or dizziness.    Objective  Allergies   Allergen Reactions    Iodinated Contrast Media Hives    Penicillins Hives     Years ago        Current Outpatient Medications   Medication Instructions    ascorbic acid (VITAMIN C) 500 mg, Daily    aspirin 81 mg, oral, Daily    atorvastatin (LIPITOR) 80 mg, oral, Nightly    cholecalciferol, vitamin D3, (VITAMIN D3 ORAL) Daily    cyanocobalamin (VITAMIN B-12) 100 mcg, Daily    dapagliflozin propanediol (FARXIGA) 10 mg, oral, Daily    docusate sodium (COLACE) 100 mg, oral, 2 times daily    furosemide (LASIX) 40 mg, oral, Daily    isosorbide mononitrate ER (IMDUR) 60 mg, oral, Daily    nitroglycerin (Nitrostat) 0.4 mg SL tablet DISSOLVE 1 TABLET UNDER THE TONGUE AS NEEDED FOR CHEST PAIN-  MAY REPEAT EVERY 5 MINUTES IF NEEDED ( MAX 3 DOSES.- IF NO RELIEF CALL 911)    pantoprazole (PROTONIX) 40 mg, Daily PRN    sacubitriL-valsartan (Entresto) 24-26 mg tablet 0.5 tablets, oral, 2 times daily    sotalol (BETAPACE) 120 mg, oral, 2 times daily    tirzepatide (Mounjaro) 12.5 mg/0.5 mL pen injector Inject 1 pen (12.5 mg) under the skin 1 (one) time per week.         Visit Vitals  /70 (BP Location: Left arm, Patient Position: Sitting)   Pulse 82   Ht 1.676 m (5' 6\")   Wt 73.9 kg (163 lb)   LMP  (LMP Unknown)   SpO2 96%   BMI 26.31 kg/m²   OB Status Postmenopausal   Smoking Status Former   BSA 1.85 m²      Physical Exam  Constitutional:       Appearance: Normal appearance.   HENT:      Head: Normocephalic.   Eyes:      Pupils: Pupils are equal, round, and reactive to light.   Cardiovascular:      Rate and Rhythm: " Normal rate and regular rhythm.      Pulses: Normal pulses.      Comments: left sided implant healed well, no ecchymosis, hematoma or drainage noted  Pulmonary:      Effort: Pulmonary effort is normal. No respiratory distress.      Breath sounds: Normal breath sounds.   Skin:     General: Skin is warm and dry.      Capillary Refill: Capillary refill takes less than 2 seconds.   Neurological:      Mental Status: She is alert.         My Interpretation of Reviewed Study(s):  Echo (March 2023): Normal left ventricular systolic function with an EF of 55 to 60%. Normal biatrial sizes. No significant valvular dysfunction. No pericardial effusion noted.    Assessment/Plan   #ICM s/p CRT-D (MDT July 2022)  #h/o VT  #Sotalol monitoring  Device reprogrammed in March 2024 to allow detection and treatment of slower VT  Patient has a [employer] Biventricular ICD.  Anticipated battery longevity 8.6yrs (as of 10/29/24).    RA pacing 67.2%, RV pacing 95.8%. Biv 94.4%  Arrhythmias noted on interrogation: Some short NSVT and AT noted.  Lead parameters stable with steady impedance and thresholds noted: Stable  c/t follow with device clinic as scheduled  Sotalol 120mg BID    #Sotalol (Betapace) - High risk medications   Labs:  Recent Labs     10/08/24  1325 07/30/24  1007 06/27/24  0905 03/15/24  0842 12/18/23  0521 10/02/23  1037 03/14/23  0415 03/13/23  0602   CREATININE  --   --  0.88 1.07* 1.15* 1.05 CANCELED 0.99   BUN  --   --  16 23 23 16 CANCELED 20   EGFR  --   --  71 56* 52* 58*  --   --    K  --   --  4.6 4.1 3.9 4.2 CANCELED 4.0   MG 2.13 1.78  --   --  2.15  --  CANCELED 2.20     Follow up:  sCr (Every 3-6 months): Dated June 2024; Within normal limits / No change compared to prior  Potassium (Every 3-6 months): Dated June 2024; Within normal limits / No change compared to prior  Magnesium (Every 3-6 months): Dated Oct 2024; Within normal limits / No change compared to prior  ECG (Every 3-6 months): ECG Dated 12/12/24;  QT/Qtc Stable  Continue with Sotalol 120mg twice a day     Return to Clinic: Patient should return to the EP Clinic in 6 months    Clement Perez MD [facility]  Cardiac Electrophysiology  [email or web address]    **Disclaimer: This note was dictated by speech recognition, and every effort has been made to prevent any error in transcription, however minor errors may be present**

## 2024-12-13 ENCOUNTER — TREATMENT (OUTPATIENT)
Dept: PHYSICAL THERAPY | Facility: CLINIC | Age: 69
End: 2024-12-13
Payer: COMMERCIAL

## 2024-12-13 DIAGNOSIS — R26.81 UNSTEADINESS ON FEET: ICD-10-CM

## 2024-12-13 DIAGNOSIS — M25.562 ACUTE PAIN OF LEFT KNEE: Primary | ICD-10-CM

## 2024-12-13 DIAGNOSIS — M62.81 GENERALIZED MUSCLE WEAKNESS: ICD-10-CM

## 2024-12-13 PROCEDURE — 97112 NEUROMUSCULAR REEDUCATION: CPT | Mod: GP

## 2024-12-13 PROCEDURE — 97110 THERAPEUTIC EXERCISES: CPT | Mod: GP

## 2024-12-13 NOTE — PROGRESS NOTES
"Physical Therapy Treatment      Patient Name: Antonia Rodriguez \"Maryjo\"  MRN: [id number]  Today's Date: 12/13/2024  Visit #: 4  Insurance: 2024 20% COINS, 240 DED (MET) 8850 OOP MAX, MC 90 DAY 2/26/2025, VS MED NEC, NO AUTH   Visit Limit: Med Nec, 90 MC  Co-Pay: $20.00  Time Calculation  Start Time: 1003  Stop Time: 1042  Time Calculation (min): 39 min    1. Acute pain of left knee        2. Unsteadiness on feet        3. Generalized muscle weakness                ASSESSMENT:  Session focusing on dynamic balance and LE strength/endurance, pt has good tolerance. Pt cont to be most limited d/t bilateral neuropathy and her being easily distractible causing inconsistent foot placement and unsteadiness. She additionally demo's inconsistent LE weakness throughout session. Discussed importance of intentional performance of functional mobility after pt \"plopped\" into chair uncontrolled enough for chair to slide a bit. Pt verbalized understanding. They are still limited by decreased muscle performance, decreased activity tolerance, impaired balance/gait, and difficulty with ADL completion, putting her at an increased risk of falling. Patient continues to be a good candidate for skilled PT in order to further address listed impairments.       GOALS:  By Discharge patient will demo:  [place] in HEP  No falls during POC  Improvement in the following outcome measures to decrease risk of falls:  FGA >/= 4pts consistent w/MCID  5xSTS </= 12seconds  mCTSIB = 120/120  Gait speed during 6MWT >/= .8m/s      Patient will improve Lower Extremity Functional Scale score to meet minimal detectable change of improvement to improve performance of ADLs. (9pts)     Patient will be independent with home exercise program for proper self-management of condition.     Patient will improve pain free active range of motion in deficit areas for ADL completion.    PLAN:  Cont to progress static/dynamic balance as tolerated caden incorporating dual " "tasking.   Cont LE strength and endurance as tolerated. (Add sidelying ABD, cybex leg ext, leg press, leg curl.)    SUBJECTIVE:  Pt reports no significant changes since last session. She feels she is getting better.    OBJECTIVE:    Treatments:  Neuromuscular Re-education (52785):   minutes  Standing Balance - 3x1min, Airex, NBOS, 2x1min EC  Fwd Jer Step Overs, step through - x5 hurdles - 1 set to fatigue  Obstacle course to challenge dynamic balance: 4\" curb step, 4\" jer, and airex pad - performing bouts to fatigue.   Alternating Toe Taps - x20, tall cone target - to improve SL stability.       Therapeutic Exercise (43567):  14 minutes  Walking for dynamic w/up and to improve overall activity tolerance - 6min, CGA, demos path deviations mostly related to easily distractible. Pt required no rest breaks this date during 6MWT w/up.   STS - 2x10 - SBA for safety d/t inconsistent unsteadiness, occasional failed reps, and uncontrolled descent at times causing chair unsteadiness.  Discussed performing intentional mvmnts to decrease error caden d/t inconsistent foot placement d/t neuropathy.       HEP:  HEP:  Access Code: [email or web address]  URL: [email or web address]  Date: 11/26/2024  Prepared by: Elijah Marcus     Exercises  - Seated Long Arc Quad  - 1 x daily - 7 x weekly - 3 sets - 15 reps  - Sit to Stand  - 1 x daily - 7 x weekly - 3 sets - 10 reps  - Heel Raises with Counter Support  - 1 x daily - 7 x weekly - 3 sets - 10 reps  - Corner Balance Feet Together With Eyes Open  - 1 x daily - 7 x weekly - 1 sets - 3 reps - 1minute hold - discussed progressing to w/cushion w/EO, discussed performing in corner and UE support prn for safety.   ^  Education and discussion on HEP and treatment regarding the benefits related to current condition, POC, pathophysiology, and precautions      "

## 2024-12-16 ENCOUNTER — LAB (OUTPATIENT)
Dept: LAB | Facility: LAB | Age: 69
End: 2024-12-16
Payer: COMMERCIAL

## 2024-12-16 DIAGNOSIS — I47.29 PAROXYSMAL VENTRICULAR TACHYCARDIA (MULTI): ICD-10-CM

## 2024-12-16 DIAGNOSIS — Z95.810 PRESENCE OF AUTOMATIC CARDIOVERTER/DEFIBRILLATOR (AICD): ICD-10-CM

## 2024-12-16 DIAGNOSIS — I25.5 ISCHEMIC CARDIOMYOPATHY: ICD-10-CM

## 2024-12-16 DIAGNOSIS — I44.7 LBBB (LEFT BUNDLE BRANCH BLOCK): ICD-10-CM

## 2024-12-16 LAB
ANION GAP SERPL CALC-SCNC: 15 MMOL/L (ref 10–20)
BUN SERPL-MCNC: 22 MG/DL (ref 6–23)
CALCIUM SERPL-MCNC: 9.6 MG/DL (ref 8.6–10.6)
CHLORIDE SERPL-SCNC: 103 MMOL/L (ref 98–107)
CO2 SERPL-SCNC: 26 MMOL/L (ref 21–32)
CREAT SERPL-MCNC: 0.88 MG/DL (ref 0.5–1.05)
EGFRCR SERPLBLD CKD-EPI 2021: 71 ML/MIN/1.73M*2
GLUCOSE SERPL-MCNC: 165 MG/DL (ref 74–99)
MAGNESIUM SERPL-MCNC: 2.32 MG/DL (ref 1.6–2.4)
POTASSIUM SERPL-SCNC: 4.4 MMOL/L (ref 3.5–5.3)
SODIUM SERPL-SCNC: 140 MMOL/L (ref 136–145)

## 2024-12-16 PROCEDURE — 83735 ASSAY OF MAGNESIUM: CPT

## 2024-12-16 PROCEDURE — 80048 BASIC METABOLIC PNL TOTAL CA: CPT

## 2024-12-16 PROCEDURE — 36415 COLL VENOUS BLD VENIPUNCTURE: CPT

## 2024-12-17 ENCOUNTER — TREATMENT (OUTPATIENT)
Dept: PHYSICAL THERAPY | Facility: CLINIC | Age: 69
End: 2024-12-17
Payer: COMMERCIAL

## 2024-12-17 DIAGNOSIS — M25.562 ACUTE PAIN OF LEFT KNEE: Primary | ICD-10-CM

## 2024-12-17 DIAGNOSIS — R26.81 UNSTEADINESS ON FEET: ICD-10-CM

## 2024-12-17 DIAGNOSIS — M62.81 GENERALIZED MUSCLE WEAKNESS: ICD-10-CM

## 2024-12-17 PROCEDURE — 97112 NEUROMUSCULAR REEDUCATION: CPT | Mod: GP

## 2024-12-17 PROCEDURE — 97530 THERAPEUTIC ACTIVITIES: CPT | Mod: GP

## 2024-12-17 NOTE — PROGRESS NOTES
"Physical Therapy Treatment      Patient Name: Antonia Rodriguez \"Maryjo\"  MRN: [id number]  Today's Date: 12/17/2024  Visit #: 5  Insurance: 2024 20% COINS, 240 DED (MET) 8850 OOP MAX, MC 90 DAY 2/26/2025, VS MED NEC, NO AUTH   Eval = 11/26  Visit Limit: Med Nec, 90 MC  Co-Pay: $20.00  Time Calculation  Start Time: 0906  Stop Time: 0945  Time Calculation (min): 39 min    1. Acute pain of left knee        2. Unsteadiness on feet        3. Generalized muscle weakness                ASSESSMENT:  Session focusing on LE strength and dynamic balance, pt had fair tolerance. Pt highly challenged this date by navigating obstacles during blaze pod activity d/t unsteadiness caden w/addition of obstacle avoidance. Pt cont to be most limited by decreased LE endurance and by her bilateral neuropathy and her being easily distractible causing inconsistent foot placement and unsteadiness Pt to cont to benefit from skilled PT to improve CLOF and decrease risk of falling.     GOALS:  By Discharge patient will demo:  [place] in HEP  No falls during POC  Improvement in the following outcome measures to decrease risk of falls:  FGA >/= 4pts consistent w/MCID  5xSTS </= 12seconds  mCTSIB = 120/120  Gait speed during 6MWT >/= .8m/s      Patient will improve Lower Extremity Functional Scale score to meet minimal detectable change of improvement to improve performance of ADLs. (9pts)     Patient will be independent with home exercise program for proper self-management of condition.     Patient will improve pain free active range of motion in deficit areas for ADL completion.    PLAN:  Cont to progress static/dynamic balance as tolerated caden incorporating dual tasking.   Cont LE strength and endurance as tolerated. (Add sidelying ABD, cybex leg ext, leg press, leg curl.)    SUBJECTIVE:  Pt reports no significant changes since last session, she had some cramps in her legs \"but that's not new.\"       OBJECTIVE:    Treatments:  Neuromuscular " Re-education (51919): 31 minutes  Standing Balance - 3x1min, Airex, NBOS, EC  Blaze Pods - 2x2min ea, Best = 26 hits, setup in square performing toe tap to Random pod - to improve dynamic balance caden w/head turns and direction changes, and SLS.   Blaze Pods - 2x2min ea, Best = 24 hits, setup in square w/river rocks, and wood rods in throughout to force obstacle step over/avoidance navigating to pod for toe tap - to improve dynamic balance caedn w/head turns and direction changes and challenge foot placement -   ^**x1 LOB requiring ModA and nearby UE support to correct**      Therapeutic Activity (09293):  8 minutes  Walking for dynamic w/up and to improve overall activity tolerance - 6min, CGA, demos path deviations mostly related to easily distractible. Pt required 1 (increased) rest breaks this date during 6MWT.   STS - 1x10 - SBA for safety d/t inconsistent unsteadiness, occasional failed reps, and uncontrolled descent at times causing chair unsteadiness.      HEP:  HEP:  Access Code: [email or web address]  URL: [email or web address]  Date: 11/26/2024  Prepared by: Elijah Marcus     Exercises  - Seated Long Arc Quad  - 1 x daily - 7 x weekly - 3 sets - 15 reps  - Sit to Stand  - 1 x daily - 7 x weekly - 3 sets - 10 reps  - Heel Raises with Counter Support  - 1 x daily - 7 x weekly - 3 sets - 10 reps  - Corner Balance Feet Together With Eyes Open  - 1 x daily - 7 x weekly - 1 sets - 3 reps - 1minute hold - discussed progressing to w/cushion w/EO, discussed performing in corner and UE support prn for safety.   ^  Education and discussion on HEP and treatment regarding the benefits related to current condition, POC, pathophysiology, and precautions

## 2024-12-19 ENCOUNTER — OFFICE VISIT (OUTPATIENT)
Dept: VASCULAR SURGERY | Facility: CLINIC | Age: 69
End: 2024-12-19
Payer: COMMERCIAL

## 2024-12-19 ENCOUNTER — APPOINTMENT (OUTPATIENT)
Dept: CARDIOLOGY | Facility: CLINIC | Age: 69
End: 2024-12-19
Payer: COMMERCIAL

## 2024-12-19 VITALS
BODY MASS INDEX: 26.36 KG/M2 | HEART RATE: 83 BPM | SYSTOLIC BLOOD PRESSURE: 112 MMHG | HEIGHT: 66 IN | DIASTOLIC BLOOD PRESSURE: 88 MMHG | WEIGHT: 164 LBS

## 2024-12-19 VITALS
SYSTOLIC BLOOD PRESSURE: 112 MMHG | BODY MASS INDEX: 26.36 KG/M2 | WEIGHT: 164 LBS | HEIGHT: 66 IN | DIASTOLIC BLOOD PRESSURE: 88 MMHG | HEART RATE: 83 BPM

## 2024-12-19 DIAGNOSIS — E78.5 DYSLIPIDEMIA: ICD-10-CM

## 2024-12-19 DIAGNOSIS — I25.118 CORONARY ARTERY DISEASE OF NATIVE ARTERY OF NATIVE HEART WITH STABLE ANGINA PECTORIS: Primary | ICD-10-CM

## 2024-12-19 DIAGNOSIS — R60.0 BILATERAL LOWER EXTREMITY EDEMA: Primary | ICD-10-CM

## 2024-12-19 PROBLEM — I50.21 ACUTE SYSTOLIC (CONGESTIVE) HEART FAILURE: Status: RESOLVED | Noted: 2024-11-11 | Resolved: 2024-12-19

## 2024-12-19 PROBLEM — I50.42 CHRONIC COMBINED SYSTOLIC AND DIASTOLIC HEART FAILURE: Status: ACTIVE | Noted: 2023-02-19

## 2024-12-19 PROBLEM — I20.89 CHRONIC STABLE ANGINA (CMS-HCC): Status: RESOLVED | Noted: 2023-02-19 | Resolved: 2024-12-19

## 2024-12-19 PROBLEM — I50.42 CHRONIC COMBINED SYSTOLIC AND DIASTOLIC HEART FAILURE: Status: RESOLVED | Noted: 2023-02-19 | Resolved: 2024-12-19

## 2024-12-19 PROCEDURE — 1123F ACP DISCUSS/DSCN MKR DOCD: CPT | Performed by: INTERNAL MEDICINE

## 2024-12-19 PROCEDURE — 3008F BODY MASS INDEX DOCD: CPT | Performed by: INTERNAL MEDICINE

## 2024-12-19 PROCEDURE — 3079F DIAST BP 80-89 MM HG: CPT | Performed by: INTERNAL MEDICINE

## 2024-12-19 PROCEDURE — 1160F RVW MEDS BY RX/DR IN RCRD: CPT | Performed by: INTERNAL MEDICINE

## 2024-12-19 PROCEDURE — 3048F LDL-C <100 MG/DL: CPT | Performed by: INTERNAL MEDICINE

## 2024-12-19 PROCEDURE — 1159F MED LIST DOCD IN RCRD: CPT | Performed by: INTERNAL MEDICINE

## 2024-12-19 PROCEDURE — 99214 OFFICE O/P EST MOD 30 MIN: CPT | Performed by: NURSE PRACTITIONER

## 2024-12-19 PROCEDURE — 1036F TOBACCO NON-USER: CPT | Performed by: INTERNAL MEDICINE

## 2024-12-19 PROCEDURE — 3044F HG A1C LEVEL LT 7.0%: CPT | Performed by: INTERNAL MEDICINE

## 2024-12-19 PROCEDURE — 99214 OFFICE O/P EST MOD 30 MIN: CPT | Performed by: INTERNAL MEDICINE

## 2024-12-19 PROCEDURE — 3074F SYST BP LT 130 MM HG: CPT | Performed by: INTERNAL MEDICINE

## 2024-12-19 RX ORDER — UBIDECARENONE 30 MG
30 CAPSULE ORAL DAILY
COMMUNITY

## 2024-12-19 RX ORDER — METOPROLOL SUCCINATE 25 MG/1
25 TABLET, EXTENDED RELEASE ORAL DAILY
Qty: 30 TABLET | Refills: 11 | Status: SHIPPED | OUTPATIENT
Start: 2024-12-19 | End: 2025-12-19

## 2024-12-19 ASSESSMENT — PAIN SCALES - GENERAL: PAINLEVEL_OUTOF10: 2

## 2024-12-19 NOTE — PROGRESS NOTES
"Subjective   Maryjo Rodriguez is a 69 y.o. female.    Chief Complaint:  68yo patient referred for \"problems with popped veins, numbness\".     HPI  Patient presents unaccompanied today. Endorses diffuse pruritus in her BLE x 1 month, RLE>LLE. Alleviated with warm shower/bath. Denies abdominal pain and/or back pain. Denies bilateral lower extremity pain at rest.  Denies bilateral lower extremity pain with exertion. Denies assistive devices. Chronic bilateral lower extremity paresthesias due to DM neuropathy. Intermittent BLE edema. Denies any active lesions or ulcers with drainage. No wound care or dressings at this time. Denies varicosities. Denies any discoloration, erythema or palor. Endorses easily bruising or bleeding w/scratching.     PMHx: HTN, CAD s/p MI, DLD, HFrEF s/p ICD, VT, DM, JOSE ARMANDO, GERD, CKD  PSHx:   Social Hx: Former smoker, quit .     Review of Systems   HENT:  Negative for nosebleeds.    Cardiovascular:  Positive for leg swelling. Negative for claudication.   Hematologic/Lymphatic: Negative for bleeding problem. Bruises/bleeds easily.   Skin:  Positive for itching. Negative for poor wound healing.   Musculoskeletal:  Negative for back pain.   Gastrointestinal:  Negative for abdominal pain, hematochezia and melena.   Genitourinary:  Negative for hematuria.   Neurological:  Positive for numbness and paresthesias.     Objective   Visit Vitals  /88   Pulse 83   Ht 1.676 m (5' 6\")   Wt 74.4 kg (164 lb)   LMP  (LMP Unknown)   BMI 26.47 kg/m²   OB Status Postmenopausal   Smoking Status Former   BSA 1.86 m²     Vitals reviewed.   Constitutional:       Appearance: Healthy appearance.   Neck:      Vascular: No carotid bruit.   Pulmonary:      Effort: Pulmonary effort is normal.      Breath sounds: Normal breath sounds.   Cardiovascular:      Normal rate. Regular rhythm.      Murmurs: There is no murmur.      No gallop.  No click. No rub.   Pulses:     Radial: 2+ bilaterally.     Dorsalis " pedis: 2+ bilaterally.  Edema:     Ankle: bilateral trace edema of the ankle.     Feet: bilateral trace edema of the feet.  Skin:     General: Skin is warm and dry.   Feet:      Right foot:      Skin integrity: Skin integrity normal.      Left foot:      Skin integrity: Skin integrity normal.   Neurological:      Mental Status: Alert and oriented to person, place and time.         Lab Review:   Lab Results   Component Value Date     12/16/2024    K 4.4 12/16/2024     12/16/2024    CO2 26 12/16/2024    BUN 22 12/16/2024    CREATININE 0.88 12/16/2024    GLUCOSE 165 (H) 12/16/2024    CALCIUM 9.6 12/16/2024     Lab Results   Component Value Date    WBC 6.3 06/27/2024    HGB 15.8 06/27/2024    HCT 48.8 (H) 06/27/2024    MCV 94 06/27/2024     06/27/2024     Lab Results   Component Value Date    CHOL 101 06/27/2024    TRIG 85 06/27/2024    HDL 47.4 06/27/2024     Diagnostic Imaging:   Vascular US LE Venous Duplex Left 10/17/24  CONCLUSIONS:  Right Lower Venous: The right common femoral vein demonstrates normal spontaneous and respirophasic flow.  Left Lower Venous: No evidence of acute deep vein thrombus visualized in the left lower extremity.     Comparison:  Compared with study from 2/19/2024, no significant change.no evidence of DVT.     Imaging & Doppler Findings:     Right        Flow  CFV   Spontaneous/Phasic        Left                  Compress Thrombus        Flow  Distal External Iliac            None   Spontaneous/Phasic  CFV                     Yes      None   Spontaneous/Phasic  PFV                     Yes      None  FV Proximal             Yes      None   Spontaneous/Phasic  FV Mid                  Yes      None  FV Distal               Yes      None  Popliteal               Yes      None   Spontaneous/Phasic  Peroneal                Yes      None  PTV                     Yes      None        [id number] Le Clark MD  Electronically signed by [id number] Le Clark MD on 10/18/2024 at 2:23:25 PM         Current Outpatient Medications:     ascorbic acid (Vitamin C) 500 mg tablet, Take 1 tablet (500 mg) by mouth once daily., Disp: , Rfl:     aspirin 81 mg EC tablet, TAKE 1 TABLET BY MOUTH DAILY, Disp: 90 tablet, Rfl: 3    atorvastatin (Lipitor) 80 mg tablet, Take 1 tablet (80 mg) by mouth once daily at bedtime., Disp: 90 tablet, Rfl: 3    cholecalciferol, vitamin D3, (VITAMIN D3 ORAL), Take by mouth once daily. Unknown dose, Disp: , Rfl:     cyanocobalamin (Vitamin B-12) 100 mcg tablet, Take 1 tablet (100 mcg) by mouth once daily., Disp: , Rfl:     dapagliflozin propanediol (Farxiga) 10 mg, Take 1 tablet (10 mg) by mouth once daily., Disp: 90 tablet, Rfl: 3    docusate sodium (Colace) 100 mg capsule, Take 1 capsule (100 mg) by mouth 2 times a day., Disp: 60 capsule, Rfl: 5    furosemide (Lasix) 40 mg tablet, Take 1 tablet (40 mg) by mouth once daily. (Patient taking differently: Take 1 tablet (40 mg) by mouth if needed.), Disp: 20 tablet, Rfl: 3    isosorbide mononitrate ER (Imdur) 60 mg 24 hr tablet, TAKE 1 TABLET BY MOUTH DAILY, Disp: 90 tablet, Rfl: 3    nitroglycerin (Nitrostat) 0.4 mg SL tablet, DISSOLVE 1 TABLET UNDER THE TONGUE AS NEEDED FOR CHEST PAIN-  MAY REPEAT EVERY 5 MINUTES IF NEEDED ( MAX 3 DOSES.- IF NO RELIEF CALL 911), Disp: 25 tablet, Rfl: 3    pantoprazole (ProtoNix) 40 mg EC tablet, Take 1 tablet (40 mg) by mouth once daily as needed. Do not crush, chew, or split., Disp: , Rfl:     sacubitriL-valsartan (Entresto) 24-26 mg tablet, Take 0.5 tablets by mouth 2 times a day., Disp: 90 tablet, Rfl: 3    sotalol (Betapace) 120 mg tablet, TAKE 1 TABLET BY MOUTH TWICE DAILY, Disp: 180 tablet, Rfl: 2    tirzepatide (Mounjaro) 12.5 mg/0.5 mL pen injector, Inject 1 pen (12.5 mg) under the skin 1 (one) time per week., Disp: 6 mL, Rfl: 1    Assessment/Plan   Evaluation For Venous Insufficiency   Endorses diffuse pruritus in her BLE x 1 month, RLE>LLE. Alleviated with warm shower/bath. Denies abdominal pain  and/or back pain. Denies bilateral lower extremity pain at rest.  Denies bilateral lower extremity pain with exertion. Denies assistive devices. Chronic bilateral lower extremity paresthesias due to DM neuropathy. Intermittent BLE edema. Denies any active lesions or ulcers with drainage. No wound care or dressings at this time. Denies varicosities. Denies any discoloration, erythema or palor. Endorses easily bruising or bleeding w/scratching.   Vascular US LE Venous Duplex Left 10/17/24 Right Lower Venous: The right common femoral vein demonstrates normal spontaneous and respirophasic flow.  Left Lower Venous: No evidence of acute deep vein thrombus visualized in the left lower extremity.  Order for Vascular US LE Venous Insufficiency Bilateral.
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
## 2024-12-20 ENCOUNTER — TREATMENT (OUTPATIENT)
Dept: PHYSICAL THERAPY | Facility: CLINIC | Age: 69
End: 2024-12-20
Payer: COMMERCIAL

## 2024-12-20 DIAGNOSIS — M62.81 GENERALIZED MUSCLE WEAKNESS: ICD-10-CM

## 2024-12-20 DIAGNOSIS — M25.562 ACUTE PAIN OF LEFT KNEE: Primary | ICD-10-CM

## 2024-12-20 DIAGNOSIS — R26.81 UNSTEADINESS ON FEET: ICD-10-CM

## 2024-12-20 PROCEDURE — 97110 THERAPEUTIC EXERCISES: CPT | Mod: GP

## 2024-12-20 PROCEDURE — 97112 NEUROMUSCULAR REEDUCATION: CPT | Mod: GP

## 2024-12-20 NOTE — PROGRESS NOTES
"Physical Therapy Treatment      Patient Name: Antonia Rodriguez \"Maryjo\"  MRN: [id number]  Today's Date: 12/20/2024  Visit #: 6  Insurance: 2024 20% COINS, 240 DED (MET) 8850 OOP MAX, MC 90 DAY 2/26/2025, VS MED NEC, NO AUTH   Eval = 11/26  Visit Limit: Med Nec, 90 MC  Co-Pay: $20.00  Time Calculation  Start Time: 1135  Stop Time: 1215  Time Calculation (min): 40 min    1. Acute pain of left knee        2. Unsteadiness on feet        3. Generalized muscle weakness                  ASSESSMENT:  Session focusing on LE strength and dynamic balance, pt had fair tolerance. She is making steady small gains in LE strength and subjectively reports carryover to ADLs as of late. Pt cont to be most limited by decreased LE endurance, her bilateral LE neuropathy and her being easily distractible causing inconsistent and poor foot placement which then causes LOB and unsteadiness. Pt to cont to benefit from skilled PT to improve CLOF and decrease risk of falling.     GOALS:  By Discharge patient will demo:  [place] in HEP  No falls during POC  Improvement in the following outcome measures to decrease risk of falls:  FGA >/= 4pts consistent w/MCID  5xSTS </= 12seconds  mCTSIB = 120/120  Gait speed during 6MWT >/= .8m/s      Patient will improve Lower Extremity Functional Scale score to meet minimal detectable change of improvement to improve performance of ADLs. (9pts)     Patient will be independent with home exercise program for proper self-management of condition.     Patient will improve pain free active range of motion in deficit areas for ADL completion.    PLAN:  Cont to progress static/dynamic balance as tolerated caden incorporating dual tasking.   Cont LE strength and endurance as tolerated. (Add sidelying ABD, cybex leg ext, leg press, leg curl.)    SUBJECTIVE:  Pt reports she came from swimming. Pt reports her meds were changed to try to improve tachycardia. Pt reports she has a venous scan scheduled for her legs. She " met w/a vascular doctor who thinks some of the cramping may be d/t vein dysfunction.       OBJECTIVE:    HR/SpO2 Throughout Session:   After 6MWT = 92bpm, 98%  After strengthening exercises = 83bpm, 99%  After x1 set of obstacle course = 90bpm, 99%  After 2nd set of obstacle course = 92bpm, 99%,     Treatments:  Neuromuscular Re-education (30388): 15 minutes   Obstacle course to challenge dynamic balance w/functional mobility: 1 rep = STS, ambulate around turn to curb step up/over, wood rods step overs, cone navigation and back through. 2 sets x4 reps.     Therapeutic Exercise (68463): 25 minutes  -Walking for dynamic w/up and to improve overall activity tolerance - 6min, CGA, demos path deviations mostly related to easily distractible. Pt required no rest breaks this date during 6MWT.   -Cybex Leg Press - 3x10, 70lbs  -Cybex HS Curl - 2x10, 30lbs  -Cybex Leg Extension - 2x10, 30lbs.        Not Performed this date:   Standing Balance - 3x1min, Airex, NBOS, EC  Blaze Pods - 2x2min ea, Best = 26 hits, setup in square performing toe tap to Random pod - to improve dynamic balance caden w/head turns and direction changes, and SLS.   Blaze Pods - 2x2min ea, Best = 24 hits, setup in square w/river rocks, and wood rods in throughout to force obstacle step over/avoidance navigating to pod for toe tap - to improve dynamic balance caden w/head turns and direction changes and challenge foot placement -   ^**x1 LOB requiring ModA and nearby UE support to correct**          HEP:  HEP:  Access Code: [email or web address]  URL: [email or web address]  Date: 11/26/2024  Prepared by: Elijah Marcus     Exercises  - Seated Long Arc Quad  - 1 x daily - 7 x weekly - 3 sets - 15 reps  - Sit to Stand  - 1 x daily - 7 x weekly - 3 sets - 10 reps  - Heel Raises with Counter Support  - 1 x daily - 7 x weekly - 3 sets - 10 reps  - Corner Balance Feet Together With Eyes Open  - 1 x daily - 7 x weekly - 1 sets - 3 reps - 1minute hold -  discussed progressing to w/cushion w/EO, discussed performing in corner and UE support prn for safety.   ^  Education and discussion on HEP and treatment regarding the benefits related to current condition, POC, pathophysiology, and precautions

## 2024-12-24 ENCOUNTER — TREATMENT (OUTPATIENT)
Dept: PHYSICAL THERAPY | Facility: CLINIC | Age: 69
End: 2024-12-24
Payer: COMMERCIAL

## 2024-12-24 DIAGNOSIS — M25.562 ACUTE PAIN OF LEFT KNEE: Primary | ICD-10-CM

## 2024-12-24 DIAGNOSIS — R26.81 UNSTEADINESS ON FEET: ICD-10-CM

## 2024-12-24 DIAGNOSIS — M62.81 GENERALIZED MUSCLE WEAKNESS: ICD-10-CM

## 2024-12-24 PROCEDURE — 97112 NEUROMUSCULAR REEDUCATION: CPT | Mod: GP

## 2024-12-24 PROCEDURE — 97530 THERAPEUTIC ACTIVITIES: CPT | Mod: GP

## 2024-12-24 NOTE — PROGRESS NOTES
"Physical Therapy Treatment      Patient Name: Antonia Rodriguez \"Maryjo\"  MRN: [id number]  Today's Date: 12/24/2024  Visit #: 7  Insurance: 2024 20% COINS, 240 DED (MET) 8850 OOP MAX, MC 90 DAY 2/26/2025, VS MED NEC, NO AUTH   Eval = 11/26  Visit Limit: Med Nec, 90 MC  Co-Pay: $20.00  Time Calculation  Start Time: 0906  Stop Time: 0945  Time Calculation (min): 39 min    1. Acute pain of left knee        2. Unsteadiness on feet        3. Generalized muscle weakness              ASSESSMENT:  Session focusing on dynamic balance and LE strength/endurance, pt challenged but tolerating well. She cont to be most limited by decreased LE endurance, bilateral LE neuropathy and her being easily distractible causing inconsistent and poor foot placement which then causes LOB and unsteadiness. Pt requires CGA for inconsistent unsteadiness throughout sessions, no significant LOB requiring physical assist this date. She is highly challenged by karishma miner d/t decreased ankle strength/reactive balance strategy but improves w/blocked practice.  Pt to cont to benefit from skilled PT to improve CLOF and decrease risk of falling.     GOALS:  By Discharge patient will demo:  [place] in HEP  No falls during POC  Improvement in the following outcome measures to decrease risk of falls:  FGA >/= 4pts consistent w/MCID  5xSTS </= 12seconds  mCTSIB = 120/120  Gait speed during 6MWT >/= .8m/s      Patient will improve Lower Extremity Functional Scale score to meet minimal detectable change of improvement to improve performance of ADLs. (9pts)     Patient will be independent with home exercise program for proper self-management of condition.     Patient will improve pain free active range of motion in deficit areas for ADL completion.    PLAN:  Cont to progress static/dynamic balance as tolerated caden incorporating dual tasking.   Cont LE strength and endurance as tolerated. (Cont karishma miner, cont cybex LE strengthening.)    SUBJECTIVE:  Pt " "reports no significant changes since last session. Pt reports she has felt significantly better since her doctor adjusted her medications, she no longer feels random lightheaded, or heart palpitations.       OBJECTIVE:  Treatments:    HR, and [id number] obstacle course = 95bpm, 99%    Neuromuscular Re-education (21294): 29 minutes   Obstacle course to challenge dynamic balance w/functional mobility: 1 rep = 2xSTS, ambulate around cone, curb step up/over, 4\" hurdles, and back through.   ^2 sets x4 reps.   Blaze Pods (substituting cones w/color call outs this date) - 2x2min ea, (Best blaze pods = 26 hits) setup in square performing toe tap to Random pod - to improve dynamic balance caden w/head turns and direction changes, and SLS.   Rocker Bd - 2x20 - to improve ankle strategy for reactive balance.    Therapeutic Activity (10976): 10 minutes  -Walking for dynamic w/up and to improve overall activity tolerance - 10 min, CGA, demos path deviations mostly related to easily distractible. Pt required no rest breaks this date during 6MWT. - final 2min ambulating around clinic finding items pointed out.           Not Performed this date:   Standing Balance - 3x1min, Airex, NBOS, EC  Blaze Pods - 2x2min ea, Best = 24 hits, setup in square w/river rocks, and wood rods in throughout to force obstacle step over/avoidance navigating to pod for toe tap - to improve dynamic balance caden w/head turns and direction changes and challenge foot placement -   ^**x1 LOB requiring ModA and nearby UE support to correct**  Cybex Leg Press - 3x10, 70lbs  Cybex HS Curl - 2x10, 30lbs  Cybex Leg Extension - 2x10, 30lbs.          HEP:  HEP:  Access Code: [email or web address]  URL: [email or web address]  Date: 11/26/2024  Prepared by: Elijah Marcus     Exercises  - Seated Long Arc Quad  - 1 x daily - 7 x weekly - 3 sets - 15 reps  - Sit to Stand  - 1 x daily - 7 x weekly - 3 sets - 10 reps  - Heel Raises with Counter Support  - 1 x daily - " 7 x weekly - 3 sets - 10 reps  - Corner Balance Feet Together With Eyes Open  - 1 x daily - 7 x weekly - 1 sets - 3 reps - 1minute hold - discussed progressing to w/cushion w/EO, discussed performing in corner and UE support prn for safety.   ^  Education and discussion on HEP and treatment regarding the benefits related to current condition, POC, pathophysiology, and precautions

## 2024-12-26 ENCOUNTER — TREATMENT (OUTPATIENT)
Dept: PHYSICAL THERAPY | Facility: CLINIC | Age: 69
End: 2024-12-26
Payer: COMMERCIAL

## 2024-12-26 DIAGNOSIS — R26.81 UNSTEADINESS ON FEET: ICD-10-CM

## 2024-12-26 DIAGNOSIS — M62.81 GENERALIZED MUSCLE WEAKNESS: ICD-10-CM

## 2024-12-26 DIAGNOSIS — M25.562 ACUTE PAIN OF LEFT KNEE: Primary | ICD-10-CM

## 2024-12-26 PROCEDURE — 97530 THERAPEUTIC ACTIVITIES: CPT | Mod: GP

## 2024-12-26 NOTE — PROGRESS NOTES
"Physical Therapy Treatment/Progress Note     Patient Name: Antonia Rodriguez \"Maryjo\"  MRN: [id number]  Today's Date: 12/26/2024  Visit #: 8  Insurance: 2024 20% COINS, 240 DED (MET) 8850 OOP MAX, MC 90 DAY 2/26/2025, VS MED NEC, NO AUTH   Last Progress Note = 12/26  Visit Limit: Med Nec, 90 MC  Co-Pay: $20.00  Time Calculation  Start Time: 1052  Stop Time: 1130  Time Calculation (min): 38 min    1. Acute pain of left knee        2. Unsteadiness on feet        3. Generalized muscle weakness                ASSESSMENT:  Pt has made steady gains on standardized assessments since start of POC. She cont to perform below cutoff values indicating increased risk of falling on FGA, 6MWT and 5xSTS, as well as requires 1 standing rest break during 6MWT indicating decreased endurance. She is caden challenged when vision is limited and w/incorporation of head turns likely d/t increased reliance d/t neuropathy. She cont to be easily distractible throughout session causing inconsistent unsteadiness d/t poor foot placement. Pt to cont to benefit from skilled PT to improve CLOF and decrease risk of falling.       GOALS:  By Discharge patient will demo:  [place] in HEP met  No falls during POC in progress  Improvement in the following outcome measures to decrease risk of falls:  FGA >/= 4pts consistent w/MCID met  FGA >/= 23/30 in progress  5xSTS </= 12seconds in progress, near met  mCTSIB = 120/120 met  Gait speed during 6MWT >/= .8m/s  met  Gait speed during 6MWT >/= 1m/s in progress  No rest breaks during 6MWT in progress     Patient will improve Lower Extremity Functional Scale score to meet minimal detectable change of improvement to improve performance of ADLs. (9pts)     Patient will be independent with home exercise program for proper self-management of condition.     Patient will improve pain free active range of motion in deficit areas for ADL completion.    PLAN:  Cont to progress static/dynamic balance as tolerated caden " "incorporating dual tasking.   (Cont more vestibular challenging dynamic balance activities such as head turns/direction changes. Cont dual tasking)  Cont LE strength and endurance as tolerated. (Cont rocker bd, cont cybex LE strengthening.)    SUBJECTIVE:  **Pt arriving 7min late, able to accommodate**  Pt reports she is tired this date, no other significant changes since last session.       OBJECTIVE:  Treatments:    Therapeutic Activity (17436): 38 minutes  -Walking for dynamic w/up and to improve overall activity tolerance - 5 min, CGA, demos path deviations mostly related to easily distractible. Requires seated rest after 4min.     5xSTS:  15sec (improved)  FGA:  18/30 (improved by 8pts)  6MWT: 297.18m, 975ft, 1 standing rest break (improved)  Gait Speed = .83m/s (improved)  mCTSIB: 120/120sec (mod-max unsteadiness during condition 4) (improved)     ^Discussion of testing results and indications related to balance, fall risk and POC. - Pt verbalized understanding and in agreement to all discussed.         Not Performed this date:   Standing Balance - 3x1min, Airex, NBOS, EC  Blaze Pods - 2x2min ea, Best = 24 hits, setup in square w/river rocks, and wood rods in throughout to force obstacle step over/avoidance navigating to pod for toe tap - to improve dynamic balance caden w/head turns and direction changes and challenge foot placement -   ^**x1 LOB requiring ModA and nearby UE support to correct**  Cybex Leg Press - 3x10, 70lbs  Cybex HS Curl - 2x10, 30lbs  Cybex Leg Extension - 2x10, 30lbs.  Obstacle course to challenge dynamic balance w/functional mobility: 1 rep = 2xSTS, ambulate around cone, curb step up/over, 4\" hurdles, and back through.   ^2 sets x4 reps.   Blaze Pods (substituting cones w/color call outs this date) - 2x2min ea, (Best blaze pods = 26 hits) setup in square performing toe tap to Random pod - to improve dynamic balance caden w/head turns and direction changes, and SLS.   Jane Morin - 2x20 - to " improve ankle strategy for reactive balance.        HEP:  HEP:  Access Code: [email or web address]  URL: [email or web address]  Date: 11/26/2024  Prepared by: Elijah Marcus     Exercises  - Seated Long Arc Quad  - 1 x daily - 7 x weekly - 3 sets - 15 reps  - Sit to Stand  - 1 x daily - 7 x weekly - 3 sets - 10 reps  - Heel Raises with Counter Support  - 1 x daily - 7 x weekly - 3 sets - 10 reps    - Access Code: [email or web address]  URL: [email or web address]  Date: 12/26/2024  Prepared by: Elijah Marcus    Exercises  - Standing Tandem Balance with Counter Support  - 1 x daily - 7 x weekly - 3 reps - 1minute hold  ^  Education and discussion on HEP and treatment regarding the benefits related to current condition, POC, pathophysiology, and precautions

## 2024-12-31 ENCOUNTER — TREATMENT (OUTPATIENT)
Dept: PHYSICAL THERAPY | Facility: CLINIC | Age: 69
End: 2024-12-31
Payer: COMMERCIAL

## 2024-12-31 DIAGNOSIS — M62.81 GENERALIZED MUSCLE WEAKNESS: ICD-10-CM

## 2024-12-31 DIAGNOSIS — R26.81 UNSTEADINESS ON FEET: ICD-10-CM

## 2024-12-31 DIAGNOSIS — M25.562 ACUTE PAIN OF LEFT KNEE: Primary | ICD-10-CM

## 2024-12-31 PROCEDURE — 97110 THERAPEUTIC EXERCISES: CPT | Mod: GP

## 2024-12-31 PROCEDURE — 97112 NEUROMUSCULAR REEDUCATION: CPT | Mod: GP

## 2024-12-31 NOTE — PROGRESS NOTES
"Physical Therapy Treatment      Patient Name: Antonia Rodriguez \"Maryjo\"  MRN: [id number]  Today's Date: 12/31/2024  Visit #: 9  Insurance: 2024 20% COINS, 240 DED (MET) 8850 OOP MAX, MC 90 DAY 2/26/2025, VS MED NEC, NO AUTH   Last Progress Note = 12/26   Visit Limit: Med Nec, 90 MC  Co-Pay: $20.00  Time Calculation  Start Time: 0949  Stop Time: 1027  Time Calculation (min): 38 min    1. Acute pain of left knee        2. Unsteadiness on feet        3. Generalized muscle weakness                ASSESSMENT:  Session focusing on dynamic balance caden incorporating head turns, and LE strength, pt has fair tolerance. She cont to be highly challenged by horizontal > vertical head turns demoing significant path deviations and occasionally steadying herself along the wall.   Pt cont to be most limited by decreased LE strength/endurance, bilateral LE neuropathy and her being easily distractible causing inconsistent and poor foot placement which then causes LOB and unsteadiness. Pt requires CGA for inconsistent unsteadiness throughout sessions. Pt to cont to benefit from skilled PT to improve functional mobility and decrease risk of falling.       GOALS:  By Discharge patient will demo:  [place] in HEP  No falls during POC  Improvement in the following outcome measures to decrease risk of falls:  FGA >/= 4pts consistent w/MCID  5xSTS </= 12seconds  mCTSIB = 120/120  Gait speed during 6MWT >/= .8m/s      Patient will improve Lower Extremity Functional Scale score to meet minimal detectable change of improvement to improve performance of ADLs. (9pts)     Patient will be independent with home exercise program for proper self-management of condition.     Patient will improve pain free active range of motion in deficit areas for ADL completion.    PLAN:  Cont to progress static/dynamic balance as tolerated caden incorporating dual tasking.   Cont LE strength and endurance as tolerated. (Cont karishma miner, cont cybex LE " "strengthening.)    SUBJECTIVE:  Pt reports she thinks her medication change has helped significantly she has had much less dizziness, and SOB w/functional mobility and is able to walk further.       OBJECTIVE:  Treatments:    Neuromuscular Re-education (78857): 15 minutes   Gait w/Head Turns - 2sets, 20' bouts to fatigue - 1st set finding playing cards along both walls as called out by PT. 2nd set, horizontal head turns every 3-4 steps.   Rocker Bd - 2x20 - to improve ankle strategy for reactive balance.    Therapeutic Exercise (81502): 23 minutes  6MWT - for dynamic w/up and to improve overall activity tolerance -  min, CGA, demos path deviations mostly related to easily distractible. Pt required 1 standing rest break this date.  Cybex Leg Press - 3x10, 75lbs, (increased) Feet at Level 3.  Cybex HS Curl - 2x8, 45lbs (increased)  Cybex Leg Extension - 2x8, 40lbs (increased)          Not Performed this date:   Standing Balance - 3x1min, Airex, NBOS, EC  Blaze Pods - 2x2min ea, Best = 24 hits, setup in square w/river rocks, and wood rods in throughout to force obstacle step over/avoidance navigating to pod for toe tap - to improve dynamic balance caden w/head turns and direction changes and challenge foot placement -   ^**x1 LOB requiring ModA and nearby UE support to correct**  Blaze Pods (substituting cones w/color call outs this date) - 2x2min ea, (Best blaze pods = 26 hits) setup in square performing toe tap to Random pod - to improve dynamic balance caden w/head turns and direction changes, and SLS.   Obstacle course to challenge dynamic balance w/functional mobility: 1 rep = 2xSTS, ambulate around cone, curb step up/over, 4\" hurdles, and back through.   ^2 sets x4 reps.           HEP:  HEP:  Access Code: [email or web address]  URL: [email or web address]  Date: 11/26/2024  Prepared by: Elijah Marcus     Exercises  - Seated Long Arc Quad  - 1 x daily - 7 x weekly - 3 sets - 15 reps  - Sit to Stand  - 1 x " daily - 7 x weekly - 3 sets - 10 reps  - Heel Raises with Counter Support  - 1 x daily - 7 x weekly - 3 sets - 10 reps  - Corner Balance Feet Together With Eyes Open  - 1 x daily - 7 x weekly - 1 sets - 3 reps - 1minute hold - discussed progressing to w/cushion w/EO, discussed performing in corner and UE support prn for safety.   ^  Education and discussion on HEP and treatment regarding the benefits related to current condition, POC, pathophysiology, and precautions

## 2025-01-02 ENCOUNTER — TREATMENT (OUTPATIENT)
Dept: PHYSICAL THERAPY | Facility: CLINIC | Age: 70
End: 2025-01-02
Payer: COMMERCIAL

## 2025-01-02 DIAGNOSIS — M62.81 GENERALIZED MUSCLE WEAKNESS: ICD-10-CM

## 2025-01-02 DIAGNOSIS — M25.562 ACUTE PAIN OF LEFT KNEE: Primary | ICD-10-CM

## 2025-01-02 DIAGNOSIS — R26.81 UNSTEADINESS ON FEET: ICD-10-CM

## 2025-01-02 PROCEDURE — 97112 NEUROMUSCULAR REEDUCATION: CPT | Mod: GP

## 2025-01-02 PROCEDURE — 97530 THERAPEUTIC ACTIVITIES: CPT | Mod: GP

## 2025-01-02 NOTE — PROGRESS NOTES
"Physical Therapy Treatment      Patient Name: Antonia Rodriguez \"Maryjo\"  MRN: [id number]  Today's Date: 1/2/2025  Visit #: 10  Insurance: 2024 20% COINS, 240 DED (MET) 8850 OOP MAX, MC 90 DAY 2/26/2025, VS MED NEC, NO AUTH   Last Progress Note = 12/26   Visit Limit: Med Nec, 90 MC  Co-Pay: $20.00  Time Calculation  Start Time: 1048  Stop Time: 1128  Time Calculation (min): 40 min    1. Acute pain of left knee        2. Unsteadiness on feet        3. Generalized muscle weakness                  ASSESSMENT:  Session focusing on dynamic balance caden w/variable vision. Pt cont to be highly challenged by ambulating w/head turns, and ambulating w/motor dual tasking. She was highly challenged by carrying item which also limits vision of her feet causing path deviations. She requires CGA d/t inconsistent unsteadiness this session pt had several LOB after catching her own feet on one another d/t neuropathy.   Pt cont to be most limited by decreased LE strength/endurance, bilateral LE neuropathy and her being easily distractible causing inconsistent and poor foot placement which then causes LOB and unsteadiness. Pt to cont to benefit from skilled PT to improve functional mobility and decrease risk of falling.       GOALS:  By Discharge patient will demo:  [place] in HEP  No falls during POC  Improvement in the following outcome measures to decrease risk of falls:  FGA >/= 4pts consistent w/MCID  5xSTS </= 12seconds  mCTSIB = 120/120  Gait speed during 6MWT >/= .8m/s      Patient will improve Lower Extremity Functional Scale score to meet minimal detectable change of improvement to improve performance of ADLs. (9pts)     Patient will be independent with home exercise program for proper self-management of condition.     Patient will improve pain free active range of motion in deficit areas for ADL completion.    PLAN:  Cont to progress static/dynamic balance as tolerated caden incorporating dual tasking.   Cont LE strength and " "endurance as tolerated. (Cont rocker bd, cont cybex LE strengthening.)    SUBJECTIVE:  Pt reports no significant changes since last session.       OBJECTIVE:  Treatments:  Neuromuscular Re-education (20811): 23 minutes   Gait w/Head Turns - 2sets, 20' bouts to fatigue - 1st set finding playing cards along both walls as called out by PT. 2nd set, horizontal head turns every 3-4 steps.   Rocker Bd - 3x20 - to improve ankle strategy for reactive balance.  Walking w/Motor Dual Tasking - 5min carrying ball on airex pad. - mod challenging pt demos multiple path deviations.   Alternating Toe Taps - 2x20, 2 tall cone targets. - to improve SLS stability.     Therapeutic Activity (39705): 17 minutes  6MWT - for dynamic w/up and to improve overall activity tolerance -  min, CGA, demos path deviations mostly related to easily distractible. Pt required 1 standing rest break this date.  STS - 2x10 - for LE strength/endurance to improve functional mobility.   Obstacle course to challenge dynamic balance w/functional mobility: 1 rep = 2xSTS, ambulate around cone, curb step up/over, 4\" hurdles, and back through.   ^2 sets x4 reps.           Not Performed this date:   Cybex Leg Press - 3x10, 75lbs, (increased) Feet at Level 3.  Cybex HS Curl - 2x8, 45lbs (increased)  Cybex Leg Extension - 2x8, 40lbs (increased)  Standing Balance - 3x1min, Airex, NBOS, EC  - to improve dynamic balance caden w/head turns and direction changes and challenge foot placement -   ^**x1 LOB requiring ModA and nearby UE support to correct**  Blaze Pods (substituting cones w/color call outs this date) - 2x2min ea, (Best blaze pods = 26 hits) setup in square performing toe tap to Random pod - to improve dynamic balance caden w/head turns and direction changes, and SLS.   Blaze Pods - 2x2min ea, (Best blaze pods = 26 hits) setup in square performing toe tap to Random pod - to improve dynamic balance caden w/head turns and direction changes, and SLS.   Blaze Pods - " 2x2min ea, Best = 24 hits, setup in square w/river rocks, and wood rods in throughout to force obstacle step over/avoidance navigating to pod for toe tap             HEP:  HEP:  Access Code: [email or web address]  URL: [email or web address]  Date: 11/26/2024  Prepared by: Elijah Marcus     Exercises  - Seated Long Arc Quad  - 1 x daily - 7 x weekly - 3 sets - 15 reps  - Sit to Stand  - 1 x daily - 7 x weekly - 3 sets - 10 reps  - Heel Raises with Counter Support  - 1 x daily - 7 x weekly - 3 sets - 10 reps  - Corner Balance Feet Together With Eyes Open  - 1 x daily - 7 x weekly - 1 sets - 3 reps - 1minute hold - discussed progressing to w/cushion w/EO, discussed performing in corner and UE support prn for safety.   ^  Education and discussion on HEP and treatment regarding the benefits related to current condition, POC, pathophysiology, and precautions

## 2025-01-03 ASSESSMENT — ENCOUNTER SYMPTOMS
POOR WOUND HEALING: 0
ABDOMINAL PAIN: 0
PARESTHESIAS: 1
CLAUDICATION: 0
BACK PAIN: 0
HEMATOCHEZIA: 0
HEMATURIA: 0
BRUISES/BLEEDS EASILY: 1
NUMBNESS: 1

## 2025-01-07 ENCOUNTER — APPOINTMENT (OUTPATIENT)
Dept: PHYSICAL THERAPY | Facility: CLINIC | Age: 70
End: 2025-01-07
Payer: COMMERCIAL

## 2025-01-09 ENCOUNTER — TREATMENT (OUTPATIENT)
Dept: PHYSICAL THERAPY | Facility: CLINIC | Age: 70
End: 2025-01-09
Payer: COMMERCIAL

## 2025-01-09 ENCOUNTER — HOSPITAL ENCOUNTER (OUTPATIENT)
Dept: CARDIOLOGY | Facility: HOSPITAL | Age: 70
Discharge: HOME | End: 2025-01-09
Payer: COMMERCIAL

## 2025-01-09 DIAGNOSIS — R26.81 UNSTEADINESS ON FEET: ICD-10-CM

## 2025-01-09 DIAGNOSIS — M62.81 GENERALIZED MUSCLE WEAKNESS: ICD-10-CM

## 2025-01-09 DIAGNOSIS — R60.0 BILATERAL LOWER EXTREMITY EDEMA: ICD-10-CM

## 2025-01-09 DIAGNOSIS — M25.562 ACUTE PAIN OF LEFT KNEE: Primary | ICD-10-CM

## 2025-01-09 PROCEDURE — 97112 NEUROMUSCULAR REEDUCATION: CPT | Mod: GP

## 2025-01-09 PROCEDURE — 93970 EXTREMITY STUDY: CPT | Performed by: INTERNAL MEDICINE

## 2025-01-09 PROCEDURE — 93970 EXTREMITY STUDY: CPT

## 2025-01-09 PROCEDURE — 97530 THERAPEUTIC ACTIVITIES: CPT | Mod: GP

## 2025-01-09 NOTE — PROGRESS NOTES
"Physical Therapy Treatment      Patient Name: Antonia Rodriguez \"Maryjo\"  MRN: [id number]  Today's Date: 1/9/2025  Visit #: 11  Insurance: 2024 20% COINS, 240 DED (MET) 8850 OOP MAX, MC 90 DAY 2/26/2025, VS MED NEC, NO AUTH   Last Progress Note = 12/26   Visit Limit: Med Nec, 90 MC  Co-Pay: $20.00  Time Calculation  Start Time: 1003  Stop Time: 1042  Time Calculation (min): 39 min    1. Acute pain of left knee        2. Unsteadiness on feet        3. Generalized muscle weakness                    ASSESSMENT:  Session focusing on dynamic balance, pt has fair tolerance. She cont to be limited in LE endurance, this session while performing standing balance activity she had one episode of near knee buckling d/t subjective fatigue which she corrected by grabbing UE support on II bar, no other LOB d/t fatigue. Pt highly challenged by any balance activity requiring head turns.     She cont to be most limited by decreased LE endurance, bilateral LE neuropathy and her being easily distractible causing inconsistent and poor foot placement which then causes LOB and unsteadiness. Pt to cont to benefit from skilled PT to improve functional mobility and decrease risk of falling.       GOALS:  By Discharge patient will demo:  [place] in HEP  No falls during POC  Improvement in the following outcome measures to decrease risk of falls:  FGA >/= 4pts consistent w/MCID  5xSTS </= 12seconds  mCTSIB = 120/120  Gait speed during 6MWT >/= .8m/s      Patient will improve Lower Extremity Functional Scale score to meet minimal detectable change of improvement to improve performance of ADLs. (9pts)     Patient will be independent with home exercise program for proper self-management of condition.     Patient will improve pain free active range of motion in deficit areas for ADL completion.    PLAN:  Cont to progress static/dynamic balance as tolerated caden incorporating dual tasking.   Cont LE strength and endurance as tolerated. (Cont " karishma bd, cont cybex LE strengthening.)    SUBJECTIVE:  Pt reports she feels she is getting stronger and she feels a lot better since the medications were updated.       OBJECTIVE:  Treatments:  Neuromuscular Re-education (30162): 24 minutes   Gait w/Head Turns - 1 set, 20' bouts to fatigue.   Blaze Pods - 3x2min ea, (Best blaze pods = 25 hits) setup in square at different heights of surfaces to incorporate head turns in multiple planes. Performing toe tap to Random pods - to improve dynamic balance caden w/head turns, direction changes, and SLS.   Standing Balance on Airex - tapping blaze pods around pt to improve head turns - 1min WBOS, 2x1min NBOS (Best = 45 taps)        Therapeutic Activity (28505): 15 minutes  6MWT - for dynamic w/up and to improve overall activity tolerance -  min, SBA, demos min path deviations mostly related to easily distractible. Pt required no standing rest this date.  STS - x10 w/out weight, 2x8 w/5lb DB - for LE strength/endurance to improve functional mobility.             Not Performed this date:   Cybex Leg Press - 3x10, 75lbs, (increased) Feet at Level 3.  Cybex HS Curl - 2x8, 45lbs (increased)  Cybex Leg Extension - 2x8, 40lbs (increased)  Standing Balance - 3x1min, Airex, NBOS, EC  - to improve dynamic balance caden w/head turns and direction changes and challenge foot placement -   ^**x1 LOB requiring ModA and nearby UE support to correct**  Blaze Pods (substituting cones w/color call outs this date) - 2x2min ea, (Best blaze pods = 26 hits) setup in square performing toe tap to Random pod - to improve dynamic balance caden w/head turns and direction changes, and SLS.     Blaze Pods - 2x2min ea, Best = 24 hits, setup in square w/river rocks, and wood rods in throughout to force obstacle step over/avoidance navigating to pod for toe tap             HEP:  HEP:  Access Code: [email or web address]  URL: [email or web address]  Date: 11/26/2024  Prepared by: Elijah Marcus      Exercises  - Seated Long Arc Quad  - 1 x daily - 7 x weekly - 3 sets - 15 reps  - Sit to Stand  - 1 x daily - 7 x weekly - 3 sets - 10 reps  - Heel Raises with Counter Support  - 1 x daily - 7 x weekly - 3 sets - 10 reps  - Corner Balance Feet Together With Eyes Open  - 1 x daily - 7 x weekly - 1 sets - 3 reps - 1minute hold - discussed progressing to w/cushion w/EO, discussed performing in corner and UE support prn for safety.   ^  Education and discussion on HEP and treatment regarding the benefits related to current condition, POC, pathophysiology, and precautions

## 2025-01-13 ENCOUNTER — LAB (OUTPATIENT)
Dept: LAB | Facility: LAB | Age: 70
End: 2025-01-13
Payer: COMMERCIAL

## 2025-01-13 ENCOUNTER — APPOINTMENT (OUTPATIENT)
Dept: PRIMARY CARE | Facility: CLINIC | Age: 70
End: 2025-01-13
Payer: COMMERCIAL

## 2025-01-13 VITALS
SYSTOLIC BLOOD PRESSURE: 118 MMHG | BODY MASS INDEX: 26.2 KG/M2 | HEART RATE: 75 BPM | HEIGHT: 66 IN | RESPIRATION RATE: 18 BRPM | DIASTOLIC BLOOD PRESSURE: 68 MMHG | OXYGEN SATURATION: 97 % | WEIGHT: 163 LBS

## 2025-01-13 DIAGNOSIS — N18.2 CKD STAGE 2 DUE TO TYPE 2 DIABETES MELLITUS (MULTI): ICD-10-CM

## 2025-01-13 DIAGNOSIS — E11.22 CONTROLLED TYPE 2 DIABETES MELLITUS WITH STAGE 2 CHRONIC KIDNEY DISEASE, WITHOUT LONG-TERM CURRENT USE OF INSULIN (MULTI): ICD-10-CM

## 2025-01-13 DIAGNOSIS — I47.29 PAROXYSMAL VENTRICULAR TACHYCARDIA (MULTI): ICD-10-CM

## 2025-01-13 DIAGNOSIS — E11.9 ENCOUNTER FOR DIABETIC FOOT EXAM (MULTI): ICD-10-CM

## 2025-01-13 DIAGNOSIS — Z95.810 PRESENCE OF AUTOMATIC CARDIOVERTER/DEFIBRILLATOR (AICD): ICD-10-CM

## 2025-01-13 DIAGNOSIS — Z71.89 GOALS OF CARE, COUNSELING/DISCUSSION: ICD-10-CM

## 2025-01-13 DIAGNOSIS — N18.2 CONTROLLED TYPE 2 DIABETES MELLITUS WITH STAGE 2 CHRONIC KIDNEY DISEASE, WITHOUT LONG-TERM CURRENT USE OF INSULIN (MULTI): ICD-10-CM

## 2025-01-13 DIAGNOSIS — N18.30 CONTROLLED TYPE 2 DIABETES MELLITUS WITH STAGE 3 CHRONIC KIDNEY DISEASE, WITHOUT LONG-TERM CURRENT USE OF INSULIN (MULTI): Primary | ICD-10-CM

## 2025-01-13 DIAGNOSIS — E11.22 CONTROLLED TYPE 2 DIABETES MELLITUS WITH STAGE 3 CHRONIC KIDNEY DISEASE, WITHOUT LONG-TERM CURRENT USE OF INSULIN (MULTI): Primary | ICD-10-CM

## 2025-01-13 DIAGNOSIS — E11.22 CKD STAGE 2 DUE TO TYPE 2 DIABETES MELLITUS (MULTI): ICD-10-CM

## 2025-01-13 DIAGNOSIS — R73.9 BLOOD GLUCOSE ELEVATED: ICD-10-CM

## 2025-01-13 DIAGNOSIS — I10 PRIMARY HYPERTENSION: ICD-10-CM

## 2025-01-13 DIAGNOSIS — I50.42 CHRONIC COMBINED SYSTOLIC AND DIASTOLIC HEART FAILURE: ICD-10-CM

## 2025-01-13 DIAGNOSIS — E78.5 DYSLIPIDEMIA: ICD-10-CM

## 2025-01-13 DIAGNOSIS — I25.118 CORONARY ARTERY DISEASE OF NATIVE ARTERY OF NATIVE HEART WITH STABLE ANGINA PECTORIS: ICD-10-CM

## 2025-01-13 DIAGNOSIS — E11.42 DIABETIC POLYNEUROPATHY ASSOCIATED WITH TYPE 2 DIABETES MELLITUS (MULTI): ICD-10-CM

## 2025-01-13 LAB
EST. AVERAGE GLUCOSE BLD GHB EST-MCNC: 131 MG/DL
HBA1C MFR BLD: 6.2 %

## 2025-01-13 PROCEDURE — 83036 HEMOGLOBIN GLYCOSYLATED A1C: CPT

## 2025-01-13 PROCEDURE — 99214 OFFICE O/P EST MOD 30 MIN: CPT | Performed by: NURSE PRACTITIONER

## 2025-01-13 RX ORDER — ATORVASTATIN CALCIUM 80 MG/1
80 TABLET, FILM COATED ORAL NIGHTLY
Qty: 90 TABLET | Refills: 3 | Status: SHIPPED | OUTPATIENT
Start: 2025-01-13 | End: 2026-01-13

## 2025-01-13 RX ORDER — FUROSEMIDE 40 MG/1
40 TABLET ORAL DAILY PRN
Qty: 20 TABLET | Refills: 1 | Status: SHIPPED | OUTPATIENT
Start: 2025-01-13 | End: 2026-01-13

## 2025-01-13 ASSESSMENT — ENCOUNTER SYMPTOMS
SHORTNESS OF BREATH: 1
FATIGUE: 1
WEAKNESS: 1
JOINT SWELLING: 0
PALPITATIONS: 1

## 2025-01-13 NOTE — PROGRESS NOTES
"Subjective   Antonia Rodriguez \"Maryjo\" is a 70 y.o. female who presents for Follow-up (6 month follow-up for diabetes. Patient has been doing well, no questions or concerns. ).    HPI  Review of Systems   Constitutional:  Positive for fatigue.   Respiratory:  Positive for shortness of breath (with exertion).    Cardiovascular:  Positive for chest pain (occasional, required SL nitro approx 1x/week in 2025, which is less than she was needing previously.) and palpitations. Negative for leg swelling.   Musculoskeletal:  Negative for joint swelling.   Neurological:  Positive for weakness.   Objective     Physical Exam  Constitutional:       General: She is not in acute distress.     Appearance: Normal appearance. She is not ill-appearing.   Cardiovascular:      Rate and Rhythm: Normal rate.      Heart sounds: No murmur heard.  Pulmonary:      Effort: Pulmonary effort is normal.   Musculoskeletal:      Right knee: Normal.   Skin:     General: Skin is warm and dry.   Neurological:      General: No focal deficit present.      Mental Status: She is alert and oriented to person, place, and time.   Psychiatric:         Mood and Affect: Mood normal.     /68   Pulse 75   Resp 18   Ht 1.676 m (5' 6\")   Wt 73.9 kg (163 lb)   LMP  (LMP Unknown)   SpO2 97%   BMI 26.31 kg/m²     Assessment/Plan     Problem List Items Addressed This Visit       Hypertension    Overview     Reviewed home Bps, which have been generally very good. Pt. States Dr. Boyer had D/C'd her Entresto and instead started low-dose metoprolol. Controlled. Continue medications. Continue to monitor.         CAD (coronary artery disease)    Overview     Follows w/ Dr. Boyer. Pt. States she had only needed her SL nitroglycerin twice so far in the new year.         Dyslipidemia    Relevant Medications    atorvastatin (Lipitor) 80 mg tablet    Presence of automatic cardioverter/defibrillator (AICD)    Paroxysmal ventricular tachycardia (Multi)    Current " Assessment & Plan     Well-controlled on Sotalol. Continue current regimen. F/U w/ cardiology as scheduled Q6 months.          Diabetic polyneuropathy associated with type 2 diabetes mellitus (Multi)    CKD stage 2 due to type 2 diabetes mellitus (Multi)    Goals of care, counseling/discussion    Current Assessment & Plan     Pt remains a full code. However, she states that if she is on life support (I.e. ventilator, tubefeeding) she does not want to be kept on those. The patient states her son is her POA-H and is aware of her wishes.          Encounter for diabetic foot exam (Multi)    Current Assessment & Plan     No open sores, wounds, or rashes on either foot. No edema. Dada DP and PT pulses palpable. Monofilament testing shows numbness on dada great toes and plantar surface at ball of foot under all toes except 5th metatarsal.         Controlled type 2 diabetes mellitus with stage 3 chronic kidney disease, without long-term current use of insulin (Multi) - Primary    Current Assessment & Plan     Pt. Is scheduled for her ophthalmology appointment tomorrow.  Reviewed home blood sugars, which have been very good, running 90-130s generally. She'll have repeat A1C in lab today.           Other Visit Diagnoses       Controlled type 2 diabetes mellitus with stage 2 chronic kidney disease, without long-term current use of insulin (Multi)        Chronic combined systolic and diastolic heart failure        Relevant Medications    furosemide (Lasix) 40 mg tablet        Pt. Continues in therapy and is feeling that she is getting better with regard to her knee and strength, but still has weakness.

## 2025-01-13 NOTE — ASSESSMENT & PLAN NOTE
Pt. Is scheduled for her ophthalmology appointment tomorrow.  Reviewed home blood sugars, which have been very good, running 90-130s generally. She'll have repeat A1C in lab today.

## 2025-01-13 NOTE — ASSESSMENT & PLAN NOTE
Pt remains a full code. However, she states that if she is on life support (I.e. ventilator, tubefeeding) she does not want to be kept on those. The patient states her son is her POA-H and is aware of her wishes.

## 2025-01-13 NOTE — ASSESSMENT & PLAN NOTE
No open sores, wounds, or rashes on either foot. No edema. Dada DP and PT pulses palpable. Monofilament testing shows numbness on dada great toes and plantar surface at ball of foot under all toes except 5th metatarsal.

## 2025-01-17 ENCOUNTER — APPOINTMENT (OUTPATIENT)
Dept: PHYSICAL THERAPY | Facility: CLINIC | Age: 70
End: 2025-01-17
Payer: COMMERCIAL

## 2025-01-17 ENCOUNTER — TELEPHONE (OUTPATIENT)
Dept: VASCULAR SURGERY | Facility: HOSPITAL | Age: 70
End: 2025-01-17

## 2025-01-17 DIAGNOSIS — M62.81 GENERALIZED MUSCLE WEAKNESS: ICD-10-CM

## 2025-01-17 DIAGNOSIS — R26.81 UNSTEADINESS ON FEET: ICD-10-CM

## 2025-01-17 DIAGNOSIS — M25.562 ACUTE PAIN OF LEFT KNEE: Primary | ICD-10-CM

## 2025-01-17 PROCEDURE — 97112 NEUROMUSCULAR REEDUCATION: CPT | Mod: GP

## 2025-01-17 PROCEDURE — 97530 THERAPEUTIC ACTIVITIES: CPT | Mod: GP

## 2025-01-17 NOTE — TELEPHONE ENCOUNTER
I have received a voice  mail message from Mrs. Rodriguez  requesting   test  results. _    The patient is requesting to speak with Ms. Karuna Almaguer CNP.   Ms. Almaguer is currently not available.  I have sent a message to Ms Almaguer  to please contact  the patient at her earliest convenience.      MATTHEW Elliott RN

## 2025-01-17 NOTE — PROGRESS NOTES
"Physical Therapy Treatment      Patient Name: Antonia Rodriguez \"Maryjo\"  MRN: [id number]  Today's Date: 1/17/2025  Visit #: 12  Insurance: 2024 20% COINS, 240 DED (MET) 8850 OOP MAX, MC 90 DAY 2/26/2025, VS MED NEC, NO AUTH   Last Progress Note = 12/26   Visit Limit: Med Nec, 90 MC  Co-Pay: $20.00  Time Calculation  Start Time: 0917  Stop Time: 0957  Time Calculation (min): 40 min    1. Acute pain of left knee        2. Unsteadiness on feet        3. Generalized muscle weakness                    ASSESSMENT:  Session focusing on dynamic balance pt had good tolerance. She was highly challenged by toe taps on airex pad requiring up to ModA at times to correct unsteadiness. The medication changes pt had made seem to have significantly helped w/her LE endurance and fatigue as she has been able to consistently make it through 6MWT w/ups. At this time pt cont to be most limited by bilateral LE neuropathy and her being easily distractible causing inconsistent and poor foot placement which then causes LOB and unsteadiness sometimes requiring nearby UE or MaxA from this therapist to correct. Pt to cont to benefit from skilled PT to improve functional mobility and decrease risk of falling.       GOALS:  By Discharge patient will demo:  [place] in HEP  No falls during POC  Improvement in the following outcome measures to decrease risk of falls:  FGA >/= 4pts consistent w/MCID  5xSTS </= 12seconds  mCTSIB = 120/120  Gait speed during 6MWT >/= .8m/s      Patient will improve Lower Extremity Functional Scale score to meet minimal detectable change of improvement to improve performance of ADLs. (9pts)     Patient will be independent with home exercise program for proper self-management of condition.     Patient will improve pain free active range of motion in deficit areas for ADL completion.    PLAN:  Cont to progress static/dynamic balance as tolerated caden incorporating dual tasking.   Cont LE strength and endurance as " tolerated. (Cont rocker bd, cont cybex LE strengthening.)    SUBJECTIVE:  Pt reports she had her venous US and she wants to know what reflux means for her leg sx. No other significant changes since last session.       OBJECTIVE:  Treatments:  Neuromuscular Re-education (87100): 26 minutes   Gait w/Head Turns - 1 set, 6x20' bouts    Standing Balance on Airex - toe tap blaze pods around pt - 3x1min WBOS, 2x1min (Best = 26 taps)  Blaze Pods - 3x1min ea, (Best = 26 hits) setup in square performing toe tap to Random pod while naming items from category (Cog dual task) - to improve dynamic balance caden w/head turns and direction changes, and SLS.   Walking around clinic w/ball on cone for motor dual task. - 3x100' bouts.         Therapeutic Activity (14670): 14 minutes  6MWT - for dynamic w/up and to improve overall activity tolerance -  min, SBA, demos min path deviations mostly related to easily distractible. Pt required no standing rest this date.  STS - 3x10 w/6lb DB - for LE strength/endurance to improve functional mobility.             Not Performed this date:   Cybex Leg Press - 3x10, 75lbs, (increased) Feet at Level 3.  Cybex HS Curl - 2x8, 45lbs (increased)  Cybex Leg Extension - 2x8, 40lbs (increased)  Standing Balance - 3x1min, Airex, NBOS, EC  - to improve dynamic balance caden w/head turns and direction changes and challenge foot placement -   ^**x1 LOB requiring ModA and nearby UE support to correct**      Blaze Pods - 2x2min ea, Best = 24 hits, setup in square w/river rocks, and wood rods in throughout to force obstacle step over/avoidance navigating to pod for toe tap             HEP:  HEP:  Access Code: [email or web address]  URL: [email or web address]  Date: 11/26/2024  Prepared by: Elijah Marcus     Exercises  - Seated Long Arc Quad  - 1 x daily - 7 x weekly - 3 sets - 15 reps  - Sit to Stand  - 1 x daily - 7 x weekly - 3 sets - 10 reps  - Heel Raises with Counter Support  - 1 x daily - 7 x weekly  - 3 sets - 10 reps  - Corner Balance Feet Together With Eyes Open  - 1 x daily - 7 x weekly - 1 sets - 3 reps - 1minute hold - discussed progressing to w/cushion w/EO, discussed performing in corner and UE support prn for safety.   ^  Education and discussion on HEP and treatment regarding the benefits related to current condition, POC, pathophysiology, and precautions

## 2025-01-23 ENCOUNTER — APPOINTMENT (OUTPATIENT)
Dept: PHYSICAL THERAPY | Facility: CLINIC | Age: 70
End: 2025-01-23
Payer: COMMERCIAL

## 2025-01-23 DIAGNOSIS — M25.562 ACUTE PAIN OF LEFT KNEE: Primary | ICD-10-CM

## 2025-01-23 DIAGNOSIS — M62.81 GENERALIZED MUSCLE WEAKNESS: ICD-10-CM

## 2025-01-23 DIAGNOSIS — R26.81 UNSTEADINESS ON FEET: ICD-10-CM

## 2025-01-23 PROCEDURE — 97112 NEUROMUSCULAR REEDUCATION: CPT | Mod: GP

## 2025-01-23 PROCEDURE — 97530 THERAPEUTIC ACTIVITIES: CPT | Mod: GP

## 2025-01-23 NOTE — PROGRESS NOTES
"Physical Therapy Treatment      Patient Name: Antonia Rodriguez \"Maryjo\"  MRN: [id number]  Today's Date: 1/23/2025  Visit #: 13  Insurance: 2024 20% COINS, 240 DED (MET) 8850 OOP MAX, MC 90 DAY 2/26/2025, VS MED NEC, NO AUTH   Last Progress Note = 12/26   Visit Limit: Med Nec, 90 MC  Co-Pay: $20.00  Time Calculation  Start Time: 1535  Stop Time: 1614  Time Calculation (min): 39 min    1. Acute pain of left knee        2. Unsteadiness on feet        3. Generalized muscle weakness                    ASSESSMENT:  Pt cont to make steady gains in activity tolerance and in static and dynamic balance. She cont to be most challenged by her bilateral neuropathy and her being somewhat distractible which causes occasional LOB/unsteadiness requiring CGA for safety. She will cont to benefit from LE strengthening for functional mobility as well. Pt to cont to benefit from skilled PT to improve functional mobility and decrease risk of falling.       GOALS:  By Discharge patient will demo:  [place] in HEP  No falls during POC  Improvement in the following outcome measures to decrease risk of falls:  FGA >/= 4pts consistent w/MCID  5xSTS </= 12seconds  mCTSIB = 120/120  Gait speed during 6MWT >/= .8m/s      Patient will improve Lower Extremity Functional Scale score to meet minimal detectable change of improvement to improve performance of ADLs. (9pts)     Patient will be independent with home exercise program for proper self-management of condition.     Patient will improve pain free active range of motion in deficit areas for ADL completion.    PLAN:  Progress toward independent HEP. Consider discharge at next reassessment as appropriate per reassessment.         SUBJECTIVE:  Pt reports her legs just hurt a bit but like normal. Pt shows her LE have some bruising and an opening in the skin d/t her scratching an itch to the point of breaking skin.       OBJECTIVE:  Treatments:  Neuromuscular Re-education (77201): 25 minutes   Toe " "Taps on Airex - 2 cone targets - 3x20  Ambulating around gym finding letters of alphabet - to improve dynamic balance w/head turns - to fatigue  NBOS EC Balance on Airex - 2x1min      Therapeutic Activity (41030): 14 minutes  6MWT - for dynamic w/up and to improve overall activity tolerance -  min, SBA, demos min path deviations mostly related to easily distractible. Pt required no standing rest this date.  Curb Step Ups - 2x10, 6\" w/out UE support.             Not Performed this date:   Cybex Leg Press - 3x10, 75lbs, (increased) Feet at Level 3.  Cybex HS Curl - 2x8, 45lbs (increased)  Cybex Leg Extension - 2x8, 40lbs (increased)  Standing Balance - 3x1min, Airex, NBOS, EC  - to improve dynamic balance caden w/head turns and direction changes and challenge foot placement -   ^**x1 LOB requiring ModA and nearby UE support to correct**  Blaze Pods - 3x1min ea, (Best = 26 hits) setup in square performing toe tap to Random pod while naming items from category (Cog dual task) - to improve dynamic balance caden w/head turns and direction changes, and SLS.   Blaze Pods - 2x2min ea, Best = 24 hits, setup in square w/river rocks, and wood rods in throughout to force obstacle step over/avoidance navigating to pod for toe tap   STS - 3x10 w/6lb DB - for LE strength/endurance to improve functional mobility.           HEP:  HEP:  Access Code: [email or web address]  URL: [email or web address]  Date: 11/26/2024  Prepared by: Elijah Marcus     Exercises  - Seated Long Arc Quad  - 1 x daily - 7 x weekly - 3 sets - 15 reps  - Sit to Stand  - 1 x daily - 7 x weekly - 3 sets - 10 reps  - Heel Raises with Counter Support  - 1 x daily - 7 x weekly - 3 sets - 10 reps  - Corner Balance Feet Together With Eyes Open  - 1 x daily - 7 x weekly - 1 sets - 3 reps - 1minute hold - discussed progressing to w/cushion w/EO, discussed performing in corner and UE support prn for safety.   ^  Education and discussion on HEP and treatment " regarding the benefits related to current condition, POC, pathophysiology, and precautions

## 2025-01-24 ENCOUNTER — TELEPHONE (OUTPATIENT)
Dept: VASCULAR SURGERY | Facility: HOSPITAL | Age: 70
End: 2025-01-24

## 2025-01-24 ENCOUNTER — APPOINTMENT (OUTPATIENT)
Dept: PHYSICAL THERAPY | Facility: CLINIC | Age: 70
End: 2025-01-24
Payer: COMMERCIAL

## 2025-01-24 DIAGNOSIS — R26.81 UNSTEADINESS ON FEET: ICD-10-CM

## 2025-01-24 DIAGNOSIS — M25.562 ACUTE PAIN OF LEFT KNEE: Primary | ICD-10-CM

## 2025-01-24 DIAGNOSIS — M62.81 GENERALIZED MUSCLE WEAKNESS: ICD-10-CM

## 2025-01-24 PROCEDURE — 97110 THERAPEUTIC EXERCISES: CPT | Mod: GP

## 2025-01-24 PROCEDURE — 97112 NEUROMUSCULAR REEDUCATION: CPT | Mod: GP

## 2025-01-24 NOTE — PROGRESS NOTES
"Physical Therapy Treatment      Patient Name: Antonia Rodriguez \"Maryjo\"  MRN: [id number]  Today's Date: 1/24/2025  Visit #: 14  Insurance: 2024 20% COINS, 240 DED (MET) 8850 OOP MAX, MC 90 DAY 2/26/2025, VS MED NEC, NO AUTH   Last Progress Note = 12/26   Visit Limit: Med Nec, 90 MC  Co-Pay: $20.00  Time Calculation  Start Time: 1216  Stop Time: 1256  Time Calculation (min): 40 min    1. Acute pain of left knee        2. Unsteadiness on feet        3. Generalized muscle weakness                    ASSESSMENT:  Session focusing on strength and dynamic balance, pt tolerating fairly. She is highly challenged performing balance after strength activities. She is highly challenged by incorporation of compliant surface into dynamic balance, requiring up to ModA at times d/t LE fatigue and difficulty of compliant surface. Will reassess standardized balance tests to assess fall risk. Pt to cont to benefit from skilled PT to improve functional mobility.      GOALS:  By Discharge patient will demo:  [place] in HEP  No falls during POC  Improvement in the following outcome measures to decrease risk of falls:  FGA >/= 4pts consistent w/MCID  5xSTS </= 12seconds  mCTSIB = 120/120  Gait speed during 6MWT >/= .8m/s      Patient will improve Lower Extremity Functional Scale score to meet minimal detectable change of improvement to improve performance of ADLs. (9pts)     Patient will be independent with home exercise program for proper self-management of condition.     Patient will improve pain free active range of motion in deficit areas for ADL completion.    PLAN:  Progress toward independent HEP. Consider discharge at next reassessment as appropriate per reassessment.         SUBJECTIVE:  Pt reports no significant changes since last session. She cont to have LE pain.       OBJECTIVE:  Treatments:  Neuromuscular Re-education (81042): 10 minutes   Blaze Pod Toe Taps on blue mat Compliant surface  - 3x2min ea, Best = 24 hits, " setup in square at various height to improve dynamic balance w/direction changes, head turns, and reaching to various heights.   Standing Blue Mat, tapping 4 Blaze pods around her -1x2min      Therapeutic Exercise (29815): 24 minutes  Cybex Leg Press - 2x10, 75lbs, (maintained) Feet at Level 3.  Cybex HS Curl - 2x8, 30lbs (decreased d/t too difficult)  Cybex Leg Extension - 2x8, 45lbs (increased)       Therapeutic Activity (05218): 6 minutes  6MWT - for dynamic w/up and to improve overall activity tolerance -  SBA. Pt more consistently making it for the entire 6min duration.               Not Performed this date:   Cybex Leg Press - 3x10, 75lbs, (increased) Feet at Level 3.  Cybex HS Curl - 2x8, 45lbs (increased)  Cybex Leg Extension - 2x8, 40lbs (increased)  Standing Balance - 3x1min, Airex, NBOS, EC  - to improve dynamic balance caden w/head turns and direction changes and challenge foot placement -   ^**x1 LOB requiring ModA and nearby UE support to correct**  Blaze Pods - 3x1min ea, (Best = 26 hits) setup in square performing toe tap to Random pod while naming items from category (Cog dual task) - to improve dynamic balance caden w/head turns and direction changes, and SLS.   STS - 3x10 w/6lb DB - for LE strength/endurance to improve functional mobility.           HEP:  HEP:  Access Code: [email or web address]  URL: [email or web address]  Date: 11/26/2024  Prepared by: Elijah Marcus     Exercises  - Seated Long Arc Quad  - 1 x daily - 7 x weekly - 3 sets - 15 reps  - Sit to Stand  - 1 x daily - 7 x weekly - 3 sets - 10 reps  - Heel Raises with Counter Support  - 1 x daily - 7 x weekly - 3 sets - 10 reps  - Corner Balance Feet Together With Eyes Open  - 1 x daily - 7 x weekly - 1 sets - 3 reps - 1minute hold - discussed progressing to w/cushion w/EO, discussed performing in corner and UE support prn for safety.   ^  Education and discussion on HEP and treatment regarding the benefits related to current  condition, POC, pathophysiology, and precautions

## 2025-01-24 NOTE — TELEPHONE ENCOUNTER
I have had the pleasure of speaking with  Ms Rodriguez     The patient is requesting to speak with Ms. Karuna Almaguer CNP.  I have explained that Ms. Almaguer is currently not available.     The patient  would like to review  recent test  results.   I have scheduled the patient for a virtual appointment on 1/27/2025( first available ) to review  test results.    MATTHEW Elliott RN

## 2025-01-27 ENCOUNTER — TELEMEDICINE (OUTPATIENT)
Dept: VASCULAR SURGERY | Facility: CLINIC | Age: 70
End: 2025-01-27
Payer: COMMERCIAL

## 2025-01-27 DIAGNOSIS — I87.2 CHRONIC VENOUS INSUFFICIENCY: Primary | ICD-10-CM

## 2025-01-27 PROCEDURE — 1159F MED LIST DOCD IN RCRD: CPT | Performed by: NURSE PRACTITIONER

## 2025-01-27 PROCEDURE — 1123F ACP DISCUSS/DSCN MKR DOCD: CPT | Performed by: NURSE PRACTITIONER

## 2025-01-27 PROCEDURE — 99213 OFFICE O/P EST LOW 20 MIN: CPT | Performed by: NURSE PRACTITIONER

## 2025-01-27 PROCEDURE — 3044F HG A1C LEVEL LT 7.0%: CPT | Performed by: NURSE PRACTITIONER

## 2025-01-27 NOTE — PROGRESS NOTES
Subjective   Maryjo Rodriguez is a 70 y.o. female.    Chief Complaint:  No chief complaint on file.    HPI      Virtual or Telephone Consent    A telephone visit (audio only) between the patient (at the originating site) and the provider (at the distant site) was utilized to provide this telehealth service.   Verbal consent was requested and obtained from Antonia Rodriguez on this date, 01/27/25 for a telehealth visit.      Denies abdominal pain. Denies low back pain. BLE pain, heaviness and weakness. RLE>LLE. Alleviated with elevation. Wears knee high compression stockings during the day. Endorses chronic bilateral pedal numbness. Endorses minimal BLE edema. Denies any active lesions or ulcers with drainage. No wound care or dressings at this time. Denies varicosities. Denies any discoloration, erythema or palor. Endorses easily bruising or bleeding w/scratching in setting of BLE pruritus. Some relief with Palmers lotion prn. Has been working with outpatient PT at [facility]. Medication adherent.     ROS    Objective   Physical Exam  Deferred    Lab Review:   Lab Results   Component Value Date     12/16/2024    K 4.4 12/16/2024     12/16/2024    CO2 26 12/16/2024    BUN 22 12/16/2024    CREATININE 0.88 12/16/2024    GLUCOSE 165 (H) 12/16/2024    CALCIUM 9.6 12/16/2024     Lab Results   Component Value Date    WBC 6.3 06/27/2024    HGB 15.8 06/27/2024    HCT 48.8 (H) 06/27/2024    MCV 94 06/27/2024     06/27/2024     Lab Results   Component Value Date    CHOL 101 06/27/2024    TRIG 85 06/27/2024    HDL 47.4 06/27/2024     Diagnostic Imaging:       Current Outpatient Medications:   •  ascorbic acid (Vitamin C) 500 mg tablet, Take 1 tablet (500 mg) by mouth once daily., Disp: , Rfl:   •  aspirin 81 mg EC tablet, TAKE 1 TABLET BY MOUTH DAILY, Disp: 90 tablet, Rfl: 3  •  atorvastatin (Lipitor) 80 mg tablet, Take 1 tablet (80 mg) by mouth once daily at bedtime., Disp: 90 tablet, Rfl: 3  •  cholecalciferol,  vitamin D3, (VITAMIN D3 ORAL), Take by mouth once daily. Unknown dose, Disp: , Rfl:   •  cyanocobalamin (Vitamin B-12) 100 mcg tablet, Take 1 tablet (100 mcg) by mouth once daily., Disp: , Rfl:   •  dapagliflozin propanediol (Farxiga) 10 mg, Take 1 tablet (10 mg) by mouth once daily., Disp: 90 tablet, Rfl: 3  •  docusate sodium (Colace) 100 mg capsule, Take 1 capsule (100 mg) by mouth 2 times a day. (Patient taking differently: Take 1 capsule (100 mg) by mouth if needed.), Disp: 60 capsule, Rfl: 5  •  furosemide (Lasix) 40 mg tablet, Take 1 tablet (40 mg) by mouth once daily as needed (For lower extremity edema)., Disp: 20 tablet, Rfl: 1  •  isosorbide mononitrate ER (Imdur) 60 mg 24 hr tablet, TAKE 1 TABLET BY MOUTH DAILY, Disp: 90 tablet, Rfl: 3  •  metoprolol succinate XL (Toprol-XL) 25 mg 24 hr tablet, Take 1 tablet (25 mg) by mouth once daily., Disp: 30 tablet, Rfl: 11  •  nitroglycerin (Nitrostat) 0.4 mg SL tablet, DISSOLVE 1 TABLET UNDER THE TONGUE AS NEEDED FOR CHEST PAIN-  MAY REPEAT EVERY 5 MINUTES IF NEEDED ( MAX 3 DOSES.- IF NO RELIEF CALL 911), Disp: 25 tablet, Rfl: 3  •  pantoprazole (ProtoNix) 40 mg EC tablet, Take 1 tablet (40 mg) by mouth once daily as needed. Do not crush, chew, or split., Disp: , Rfl:   •  sotalol (Betapace) 120 mg tablet, TAKE 1 TABLET BY MOUTH TWICE DAILY, Disp: 180 tablet, Rfl: 2  •  tirzepatide (Mounjaro) 12.5 mg/0.5 mL pen injector, Inject 1 pen (12.5 mg) under the skin 1 (one) time per week., Disp: 6 mL, Rfl: 1    Assessment/Plan   Chronic Venous Insufficiency  Patient declining invasive intervention.    deep vein thrombus visualized in the right lower extremity.     Left Lower Venous: No evidence of acute deep vein thrombus visualized in the left lower extremity.     Additional Findings:  Technically difficult study due to patient pain/ movement with cuff inflation.        Comparison:  Compared with study from 10/17/2024, Prior exam was a venous duplex which was negative for DVT left lower extremity.     Imaging & Doppler Findings:     Right          Compress Thrombus  Diam   Depth    Time  SFJ              Yes      None   8.2 mm 22.7 mm 0.70 sec  Prox Thigh GSV   Yes      None   5.6 mm 17.9 mm 4.40 sec  Mid Thigh GSV    Yes      None   4.5 mm 8.4 mm  1.30 sec  Knee GSV         Yes      None   4.2 mm 5.4 mm  0.80 sec  Prox Calf GSV    Yes      None  Mid Calf GSV     Yes      None  Dist Calf GSV    Yes      None   2.4 mm 3.3 mm  1.10 sec  SPJ              Yes      None                  0.00 sec  SSV Prox         Yes      None                  0.00 sec  SSV Mid          Yes      None                  0.00 sec  SSV Distal       Yes      None        Left           Compress Thrombus  Diam    Depth    Time  SFJ              Yes      None   10.1 mm 29.1 mm 0.50 sec  Prox Thigh GSV   Yes      None   4.0 mm  14.3 mm 3.80 sec  Mid Thigh GSV    Yes      None                   0.00 sec  Knee GSV         Yes      None                   0.00 sec  Prox Calf GSV    Yes      None                   0.00 sec  Mid Calf GSV     Yes      None                   0.00 sec  Dist Calf GSV    Yes      None                   0.00 sec  SPJ              Yes      None                   0.00 sec  SSV Prox         Yes      None                   0.00 sec  SSV Mid          Yes      None                   0.00 sec  SSV Distal       Yes      None                   0.00 sec        Right                 Compressible Thrombus        Flow  Distal External Iliac                None  CFV                       Yes        None   Spontaneous/Phasic  PFV                        Yes        None  FV Proximal               Yes        None  FV Mid                    Yes        None   Spontaneous/Phasic  FV Distal                 Yes        None  Popliteal                 Yes        None   Spontaneous/Phasic  Peroneal                  Yes        None  PTV                       Yes        None        Left                  Compress Thrombus        Flow  Distal External Iliac            None  CFV                     Yes      None   Spontaneous/Phasic  PFV                     Yes      None  FV Proximal             Yes      None  FV Mid                  Yes      None   Spontaneous/Phasic  FV Distal               Yes      None  Popliteal               Yes      None   Spontaneous/Phasic  Peroneal                Yes      None  PTV                     Yes      None        [id number] Karuna Morrissey MD, RPVI  Electronically signed by [id number] Karuna Morrissey MD, RPVI on 1/10/2025 at 9:29:47 AM    Current Outpatient Medications:     ascorbic acid (Vitamin C) 500 mg tablet, Take 1 tablet (500 mg) by mouth once daily., Disp: , Rfl:     aspirin 81 mg EC tablet, TAKE 1 TABLET BY MOUTH DAILY, Disp: 90 tablet, Rfl: 3    atorvastatin (Lipitor) 80 mg tablet, Take 1 tablet (80 mg) by mouth once daily at bedtime., Disp: 90 tablet, Rfl: 3    cholecalciferol, vitamin D3, (VITAMIN D3 ORAL), Take by mouth once daily. Unknown dose, Disp: , Rfl:     cyanocobalamin (Vitamin B-12) 100 mcg tablet, Take 1 tablet (100 mcg) by mouth once daily., Disp: , Rfl:     dapagliflozin propanediol (Farxiga) 10 mg, Take 1 tablet (10 mg) by mouth once daily., Disp: 90 tablet, Rfl: 3    docusate sodium (Colace) 100 mg capsule, Take 1 capsule (100 mg) by mouth 2 times a day. (Patient taking differently: Take 1 capsule (100 mg) by mouth if needed.), Disp: 60 capsule, Rfl: 5    furosemide (Lasix) 40 mg tablet, Take 1 tablet (40 mg) by mouth once daily as needed (For lower extremity edema)., Disp: 20 tablet, Rfl: 1    isosorbide  mononitrate ER (Imdur) 60 mg 24 hr tablet, TAKE 1 TABLET BY MOUTH DAILY, Disp: 90 tablet, Rfl: 3    metoprolol succinate XL (Toprol-XL) 25 mg 24 hr tablet, Take 1 tablet (25 mg) by mouth once daily., Disp: 30 tablet, Rfl: 11    nitroglycerin (Nitrostat) 0.4 mg SL tablet, DISSOLVE 1 TABLET UNDER THE TONGUE AS NEEDED FOR CHEST PAIN-  MAY REPEAT EVERY 5 MINUTES IF NEEDED ( MAX 3 DOSES.- IF NO RELIEF CALL 911), Disp: 25 tablet, Rfl: 3    pantoprazole (ProtoNix) 40 mg EC tablet, Take 1 tablet (40 mg) by mouth once daily as needed. Do not crush, chew, or split., Disp: , Rfl:     sotalol (Betapace) 120 mg tablet, TAKE 1 TABLET BY MOUTH TWICE DAILY, Disp: 180 tablet, Rfl: 2    tirzepatide (Mounjaro) 12.5 mg/0.5 mL pen injector, Inject 1 pen (12.5 mg) under the skin 1 (one) time per week., Disp: 6 mL, Rfl: 1    Assessment/Plan   Chronic Venous Insufficiency  Denies abdominal pain. Denies low back pain. BLE pain, heaviness and weakness. RLE>LLE. Alleviated with elevation. Wears knee high compression stockings during the day. Endorses chronic bilateral pedal numbness. Endorses minimal BLE edema. Denies any active lesions or ulcers with drainage. Denies any discoloration, erythema or palor. Endorses easily bruising or bleeding w/scratching in setting of BLE pruritus. Some relief with Palmers lotion prn. Has been working with outpatient PT at [facility].   Physical exam deferred due to limitations (audio only) of visit.   Vascular US LE Venous Insufficiency Bilateral 1/9/25 Right Lower Venous Insufficiency: Reflux is noted in the entire length of the thigh great saphenous, distal calf great saphenous and saphenofemoral junction veins. Great saphenous vein exits the fascia at proximal-mid thigh and does not reenter until mid-distal calf. Small saphenous vein has no reflux. Left Lower Venous Insufficiency: Reflux is noted in the proximal thigh great saphenous vein. Borderline reflux at the saphenofemoral junction of 0.54 seconds. Small  saphenous vein has no reflux.  Continue conservative management with compression, elevation, and walking as tolerated.   Patient declining invasive intervention (I.e. sclerotherapy, phlebectomy, ablation).

## 2025-01-28 ENCOUNTER — APPOINTMENT (OUTPATIENT)
Dept: PHYSICAL THERAPY | Facility: CLINIC | Age: 70
End: 2025-01-28
Payer: COMMERCIAL

## 2025-01-28 DIAGNOSIS — M25.562 ACUTE PAIN OF LEFT KNEE: Primary | ICD-10-CM

## 2025-01-28 DIAGNOSIS — R26.81 UNSTEADINESS ON FEET: ICD-10-CM

## 2025-01-28 DIAGNOSIS — M62.81 GENERALIZED MUSCLE WEAKNESS: ICD-10-CM

## 2025-01-28 PROCEDURE — 97112 NEUROMUSCULAR REEDUCATION: CPT | Mod: GP

## 2025-01-28 PROCEDURE — 97530 THERAPEUTIC ACTIVITIES: CPT | Mod: GP

## 2025-01-28 NOTE — PROGRESS NOTES
"Physical Therapy Reassessment      Patient Name: Antonia Rodriguez \"Maryjo\"  MRN: [id number]  Today's Date: 1/28/2025  Visit #: 15  Insurance: 2024 20% COINS, 240 DED (MET) 8850 OOP MAX, MC 90 DAY 2/26/2025, VS MED NEC, NO AUTH   Last Progress Note = 1/28   Visit Limit: Med Nec, 90 MC  Co-Pay: $20.00  Time Calculation  Start Time: 1355  Stop Time: 1454  Time Calculation (min): 59 min    1. Acute pain of left knee        2. Unsteadiness on feet        3. Generalized muscle weakness                ASSESSMENT:  Pt has made steady gains in LE strength and static/dynamic balance throughout POC. She is now scoring better than cutoff values on all standardized assessments except for 5xSTS d/t some minimal remaining strength deficits. At this time pt taking 1 month hiatus from PT to progress/perform independent maintenance HEP. She will follow up afterward to reassess her ability to maintain her gains and decreased fall risk status.         GOALS:  By Discharge patient will demo:  [place] in HEP - in progress  No falls during POC - met  Improvement in the following outcome measures to decrease risk of falls:  FGA >/= 4pts consistent w/MCID - met  5xSTS </= 12seconds - near met (14sec)  mCTSIB = 120/120 - met  Gait speed during 6MWT >/= .8m/s - met     Patient will improve Lower Extremity Functional Scale score to meet minimal detectable change of improvement to improve performance of ADLs. (9pts) in progress     Patient will be independent with home exercise program for proper self-management of condition. met     Patient will improve pain free active range of motion in deficit areas for ADL completion. met    PLAN:  1 month hiatus from PT to assess ability to maintain gains w/independent HEP.         SUBJECTIVE:  Pt reports she talked to her vascular doctor and they discussed that there isn't much she can do until she needs an ablation.       OBJECTIVE:  Treatments:  Neuromuscular Re-education (89875): 23 minutes "   Blaze Pod Toe Taps on blue mat Compliant surface  - 2x2min ea, Best = 24 hits, setup in square at various height to improve dynamic balance w/direction changes, head turns, and reaching to various heights.   Standing Blue Mat, tapping 4 Blaze pods around her -3x1min (Best = 29)  Tandem Walking - to fatigue  Tandem Stance w/UE support prn - 2x1min  Marching w/UE support to ensure appropriate/safe HEP performance - x20          Therapeutic Activity (12227): 36 minutes  5xSTS:  14sec (improved)  FGA:  23/30 (improved by 8pts)  6MWT: 326.13m, 1070ft, 0 rest breaks (improved)  Gait Speed = .906m/s (improved)  mCTSIB: 120/120sec (mod-max unsteadiness during condition 4) (improved)     -Discussing in depth importance of performing maintenance HEP to maintain gains that were made. Discussed appropriate progressions to make to HEP caden related to walking to cont to improve endurance/strength as tolerated.                 Not Performed this date:   Cybex Leg Press - 3x10, 75lbs, (increased) Feet at Level 3.  Cybex HS Curl - 2x8, 45lbs (increased)  Cybex Leg Extension - 2x8, 40lbs (increased)  Standing Balance - 3x1min, Airex, NBOS, EC  - to improve dynamic balance caden w/head turns and direction changes and challenge foot placement -   ^**x1 LOB requiring ModA and nearby UE support to correct**  Blaze Pods - 3x1min ea, (Best = 26 hits) setup in square performing toe tap to Random pod while naming items from category (Cog dual task) - to improve dynamic balance caden w/head turns and direction changes, and SLS.   STS - 3x10 w/6lb DB - for LE strength/endurance to improve functional mobility.           HEP:  Access [email or web address]: [email or web address]  URL: [email or web address]  Date: 01/28/2025  Prepared by: Elijah Marcus    Exercises  - Walking  - 1 x daily - 4-5 x weekly - 1 sets  - Sit to Stand Without Arm Support  - 1 x daily - 3-4 x weekly - 3 sets - 10 reps  - Heel Raises with Counter Support  - 1 x daily - 3-4 x weekly -  3 sets - 15 reps  - Standing Tandem Balance with Counter Support  - 1 x daily - 3-4 x weekly - 1 sets - 3 reps - 1min hold  - Standing March with Counter Support  - 1 x daily - 7 x weekly - 3 sets - 10 reps  ^  Education and discussion on HEP and treatment regarding the benefits related to current condition, POC, pathophysiology, and precautions

## 2025-01-30 ENCOUNTER — APPOINTMENT (OUTPATIENT)
Dept: PHYSICAL THERAPY | Facility: CLINIC | Age: 70
End: 2025-01-30
Payer: COMMERCIAL

## 2025-01-30 DIAGNOSIS — M62.81 GENERALIZED MUSCLE WEAKNESS: ICD-10-CM

## 2025-01-30 DIAGNOSIS — M25.562 ACUTE PAIN OF LEFT KNEE: Primary | ICD-10-CM

## 2025-01-30 DIAGNOSIS — R26.81 UNSTEADINESS ON FEET: ICD-10-CM

## 2025-02-03 ENCOUNTER — HOSPITAL ENCOUNTER (OUTPATIENT)
Dept: RADIOLOGY | Facility: CLINIC | Age: 70
Discharge: HOME | End: 2025-02-03
Payer: COMMERCIAL

## 2025-02-03 ENCOUNTER — OFFICE VISIT (OUTPATIENT)
Dept: PRIMARY CARE | Facility: CLINIC | Age: 70
End: 2025-02-03
Payer: COMMERCIAL

## 2025-02-03 VITALS
SYSTOLIC BLOOD PRESSURE: 122 MMHG | BODY MASS INDEX: 26.47 KG/M2 | DIASTOLIC BLOOD PRESSURE: 74 MMHG | HEART RATE: 90 BPM | RESPIRATION RATE: 16 BRPM | TEMPERATURE: 97 F | WEIGHT: 164 LBS | OXYGEN SATURATION: 96 %

## 2025-02-03 DIAGNOSIS — M54.41 ACUTE RIGHT-SIDED LOW BACK PAIN WITH RIGHT-SIDED SCIATICA: ICD-10-CM

## 2025-02-03 DIAGNOSIS — M54.41 ACUTE RIGHT-SIDED LOW BACK PAIN WITH RIGHT-SIDED SCIATICA: Primary | ICD-10-CM

## 2025-02-03 PROCEDURE — 1123F ACP DISCUSS/DSCN MKR DOCD: CPT | Performed by: FAMILY MEDICINE

## 2025-02-03 PROCEDURE — 73503 X-RAY EXAM HIP UNI 4/> VIEWS: CPT | Mod: RIGHT SIDE | Performed by: RADIOLOGY

## 2025-02-03 PROCEDURE — 72110 X-RAY EXAM L-2 SPINE 4/>VWS: CPT | Performed by: RADIOLOGY

## 2025-02-03 PROCEDURE — 3074F SYST BP LT 130 MM HG: CPT | Performed by: FAMILY MEDICINE

## 2025-02-03 PROCEDURE — 99214 OFFICE O/P EST MOD 30 MIN: CPT | Performed by: FAMILY MEDICINE

## 2025-02-03 PROCEDURE — 3044F HG A1C LEVEL LT 7.0%: CPT | Performed by: FAMILY MEDICINE

## 2025-02-03 PROCEDURE — 72110 X-RAY EXAM L-2 SPINE 4/>VWS: CPT

## 2025-02-03 PROCEDURE — 1160F RVW MEDS BY RX/DR IN RCRD: CPT | Performed by: FAMILY MEDICINE

## 2025-02-03 PROCEDURE — 3078F DIAST BP <80 MM HG: CPT | Performed by: FAMILY MEDICINE

## 2025-02-03 PROCEDURE — 1159F MED LIST DOCD IN RCRD: CPT | Performed by: FAMILY MEDICINE

## 2025-02-03 PROCEDURE — 73503 X-RAY EXAM HIP UNI 4/> VIEWS: CPT | Mod: RT

## 2025-02-03 RX ORDER — CYCLOBENZAPRINE HCL 5 MG
5 TABLET ORAL NIGHTLY
Qty: 7 TABLET | Refills: 0 | Status: SHIPPED | OUTPATIENT
Start: 2025-02-03 | End: 2025-02-10

## 2025-02-03 RX ORDER — METHYLPREDNISOLONE 4 MG/1
TABLET ORAL
Qty: 21 TABLET | Refills: 0 | Status: SHIPPED | OUTPATIENT
Start: 2025-02-03 | End: 2025-02-09

## 2025-02-03 ASSESSMENT — ENCOUNTER SYMPTOMS
NAUSEA: 1
HEMATURIA: 0
VOMITING: 0
RHINORRHEA: 0
LEG PAIN: 1
FEVER: 0
BLOOD IN STOOL: 0
COUGH: 0
ARTHRALGIAS: 1
DIARRHEA: 0
WEAKNESS: 0
INABILITY TO BEAR WEIGHT: 1
FATIGUE: 0
BACK PAIN: 0
CONSTIPATION: 0
SORE THROAT: 0
NUMBNESS: 1
NECK PAIN: 0
DIFFICULTY URINATING: 0
SHORTNESS OF BREATH: 1
WHEEZING: 0

## 2025-02-03 NOTE — PROGRESS NOTES
Subjective   Patient ID: Maryjo Rodriguez is a 70 y.o. female who presents for Leg Pain.    Leg Pain   Incident onset: yesterday. There was no injury mechanism. The pain is present in the right thigh and right hip. Quality: sharp. The pain is at a severity of 10/10. Associated symptoms include an inability to bear weight and numbness.        Review of Systems   Constitutional:  Negative for fatigue and fever.   HENT:  Negative for congestion, ear pain, rhinorrhea and sore throat.    Respiratory:  Positive for shortness of breath. Negative for cough and wheezing.         Sob now and then   Cardiovascular:  Negative for chest pain.   Gastrointestinal:  Positive for nausea. Negative for blood in stool, constipation, diarrhea and vomiting.   Genitourinary:  Negative for difficulty urinating, hematuria, vaginal bleeding and vaginal discharge.   Musculoskeletal:  Positive for arthralgias. Negative for back pain and neck pain.        Pt with right leg pain worsening   x 1-2 d. Pt has had leg pain bilateral x 1 month  Pt has seen ortho for hematoma on left knee due to fall in November  Belia/quincy referred pt to vasc surg due to leg pain in December,   Pt had venous dopplers 2 wk ago due to bilateral leg pain, has venous insufficiency and reflux, neg for dvt.  Pt denies recent fall or strenuous activity    Pain in right hip, radiates to right knee  Pain is sharp, intermittent  Pain worse with standing or sitting for prolonged period of time  Pain improved with lyong down and elevating leg  Pt has chronic bilateral neuropathy , no weakness  No redness swellng or bruising   Skin:  Negative for rash.   Neurological:  Positive for numbness. Negative for weakness.       Objective   /74   Pulse 90   Temp 36.1 °C (97 °F)   Resp 16   Wt 74.4 kg (164 lb)   LMP  (LMP Unknown)   SpO2 96%   BMI 26.47 kg/m²     Physical Exam  Vitals and nursing note reviewed.   Constitutional:       General: She is not in acute distress.      Appearance: Normal appearance.   HENT:      Head: Normocephalic and atraumatic.   Cardiovascular:      Rate and Rhythm: Normal rate and regular rhythm.      Heart sounds: Normal heart sounds.   Pulmonary:      Effort: Pulmonary effort is normal.      Breath sounds: Normal breath sounds.   Abdominal:      General: Abdomen is flat. Bowel sounds are normal.      Palpations: Abdomen is soft.   Musculoskeletal:         General: Tenderness present. No deformity.      Comments: Back is straight, tender to palpation of lower lumbar sp and right buttocks/hip  Neg SLR, neg foot drop,   Pt at baseline neurovasc status   Skin:     General: Skin is warm and dry.   Neurological:      Mental Status: She is alert. Mental status is at baseline.         Assessment/Plan   Problem List Items Addressed This Visit             ICD-10-CM    Acute right-sided low back pain with right-sided sciatica - Primary M54.41     Pt to take meds as directed  Rest, use heat to affected area  Get xr today  F/up with pcp on Friday, appt will be made for pt before leaving today  Go to ER if worsening symptoms         Relevant Medications    methylPREDNISolone (Medrol Dospak) 4 mg tablets    cyclobenzaprine (Flexeril) 5 mg tablet    Other Relevant Orders    XR lumbar spine complete 4+ views    XR hip right with pelvis when performed 4+ views

## 2025-02-03 NOTE — ASSESSMENT & PLAN NOTE
Pt to take meds as directed  Rest, use heat to affected area  Get xr today  F/up with pcp on Friday, appt will be made for pt before leaving today  Go to ER if worsening symptoms

## 2025-02-04 ENCOUNTER — DOCUMENTATION (OUTPATIENT)
Dept: SPEECH THERAPY | Facility: HOSPITAL | Age: 70
End: 2025-02-04
Payer: COMMERCIAL

## 2025-02-04 ENCOUNTER — TELEPHONE (OUTPATIENT)
Dept: CARDIOLOGY | Facility: CLINIC | Age: 70
End: 2025-02-04
Payer: COMMERCIAL

## 2025-02-04 NOTE — PROGRESS NOTES
"Speech-Language Pathology    Discharge Summary    Name: Antonia Rodriguez \"Maryjo\"  MRN: [id number]  : 1955  Date: 25    Discharge Summary: SLP    Discharge Information: Date of last visit 23, Date of evaluation 23, Number of attended visits 2, Referred by AYE Hernandez, and Referred for JOSE ARMANDO    Therapy Summary: Patient completed speech therapy evaluation for JOSE ARMANDO, and was seen for 1 follow-up visit. Patient was independent with treatment exercises, and reported improvement in her symptoms. Patient was instructed to follow-up as needed.     Rehab Discharge Reason: Achieved all and/or the most significant goals(s)           "

## 2025-02-05 ENCOUNTER — HOSPITAL ENCOUNTER (OUTPATIENT)
Dept: CARDIOLOGY | Facility: CLINIC | Age: 70
Discharge: HOME | End: 2025-02-05
Payer: COMMERCIAL

## 2025-02-05 DIAGNOSIS — I25.5 ISCHEMIC CARDIOMYOPATHY: ICD-10-CM

## 2025-02-05 PROCEDURE — 93296 REM INTERROG EVL PM/IDS: CPT

## 2025-02-05 ASSESSMENT — ENCOUNTER SYMPTOMS
POOR WOUND HEALING: 0
HEMATOCHEZIA: 0
NUMBNESS: 1
ABDOMINAL PAIN: 0
COLOR CHANGE: 0
BACK PAIN: 0
PARESTHESIAS: 1
BRUISES/BLEEDS EASILY: 1
HEMATURIA: 0

## 2025-02-06 ENCOUNTER — TELEPHONE (OUTPATIENT)
Dept: PRIMARY CARE | Facility: CLINIC | Age: 70
End: 2025-02-06
Payer: COMMERCIAL

## 2025-02-06 ENCOUNTER — TELEPHONE (OUTPATIENT)
Dept: CARDIOLOGY | Facility: CLINIC | Age: 70
End: 2025-02-06
Payer: COMMERCIAL

## 2025-02-06 DIAGNOSIS — I10 PRIMARY HYPERTENSION: Primary | ICD-10-CM

## 2025-02-06 RX ORDER — SACUBITRIL AND VALSARTAN 24; 26 MG/1; MG/1
1 TABLET, FILM COATED ORAL 2 TIMES DAILY
COMMUNITY
Start: 2025-02-06

## 2025-02-06 NOTE — TELEPHONE ENCOUNTER
Patient called reporting she is currently being treated with a medrol dose pack for sciatica flare up. She states her BP has been running 140-150/100-110. She states she is having 7/10 pain in her right thigh area. Per Dr. Boyer, instructed patient to resume Entresto 24-26mg 1/2 tab BID while blood pressures are elevated. Patient states she has the meds at home, and verbalized understanding. She will continue to monitor BP.

## 2025-02-06 NOTE — TELEPHONE ENCOUNTER
Patient went to urgent care on 2/3/2025 for leg pain and was started on Flexeril 5 mg once daily for 7 days, and a Medrol Dospak.   Patient states her blood pressure has been elevated, would like to know if this could be caused by the medications prescribed.     Patient has an appointment with you tomorrow, 2/7/2025 to follow-up with this.     Maryjo [phone]

## 2025-02-07 ENCOUNTER — OFFICE VISIT (OUTPATIENT)
Dept: PRIMARY CARE | Facility: CLINIC | Age: 70
End: 2025-02-07
Payer: COMMERCIAL

## 2025-02-07 VITALS
HEART RATE: 78 BPM | RESPIRATION RATE: 18 BRPM | WEIGHT: 161 LBS | HEIGHT: 66 IN | OXYGEN SATURATION: 96 % | TEMPERATURE: 97.4 F | BODY MASS INDEX: 25.88 KG/M2

## 2025-02-07 DIAGNOSIS — M25.561 RIGHT MEDIAL KNEE PAIN: Primary | ICD-10-CM

## 2025-02-07 PROCEDURE — 3044F HG A1C LEVEL LT 7.0%: CPT | Performed by: NURSE PRACTITIONER

## 2025-02-07 PROCEDURE — 1036F TOBACCO NON-USER: CPT | Performed by: NURSE PRACTITIONER

## 2025-02-07 PROCEDURE — 1123F ACP DISCUSS/DSCN MKR DOCD: CPT | Performed by: NURSE PRACTITIONER

## 2025-02-07 PROCEDURE — 3008F BODY MASS INDEX DOCD: CPT | Performed by: NURSE PRACTITIONER

## 2025-02-07 PROCEDURE — 99214 OFFICE O/P EST MOD 30 MIN: CPT | Performed by: NURSE PRACTITIONER

## 2025-02-07 PROCEDURE — 1159F MED LIST DOCD IN RCRD: CPT | Performed by: NURSE PRACTITIONER

## 2025-02-07 ASSESSMENT — ENCOUNTER SYMPTOMS
ARTHRALGIAS: 1
PALPITATIONS: 0
BACK PAIN: 0
COUGH: 0
CHILLS: 0
JOINT SWELLING: 0
SHORTNESS OF BREATH: 0
FATIGUE: 1
FEVER: 0

## 2025-02-07 NOTE — PROGRESS NOTES
"Subjective   Antonia Rodriguez \"Maryjo\" is a 70 y.o. female who presents for Follow-up. Patient went to [facility] on 02/03/2025 for leg pain. There she was diagnosed w/ low back pain with sciatica to right leg and was started on Flexeril 5 mg tablet and a Medrol Dospak, patient notes she has not been taking Flexeril. Patient has concerns that Medrol Dospak has been effecting blood pressure. The patient states she is not having back pain. She states her pain is in the right knee and radiates up her thigh. She denies recent fall or other known cause for acute onset of right knee pain.     HPI  Review of Systems   Constitutional:  Positive for fatigue. Negative for chills and fever.   Respiratory:  Negative for cough and shortness of breath.    Cardiovascular:  Negative for chest pain, palpitations and leg swelling.   Musculoskeletal:  Positive for arthralgias (severe right knee pain, started about 1 week ago w/ no known injury). Negative for back pain and joint swelling.   Objective     Physical Exam  Constitutional:       Appearance: Normal appearance. She is normal weight. She is not ill-appearing.   Musculoskeletal:      Lumbar back: Normal. No spasms or tenderness. Negative right straight leg raise test and negative left straight leg raise test.      Right hip: No tenderness or bony tenderness.      Right knee: Crepitus present. No swelling, effusion, erythema, ecchymosis or bony tenderness. Tenderness present over the medial joint line. No lateral joint line tenderness. Normal alignment. Normal pulse.      Instability Tests: Anterior drawer test negative. Posterior drawer test negative.      Left knee: Crepitus present.      Right lower leg: No edema.      Left lower leg: No edema.      Comments: No SI joint tenderness, no pyriformis tenderness, no hip tenderness on palpation.   Skin:     General: Skin is warm and dry.   Neurological:      General: No focal deficit present.      Mental Status: She is alert " "and oriented to person, place, and time.   Psychiatric:         Mood and Affect: Mood normal.     Pulse 78   Temp 36.3 °C (97.4 °F)   Resp 18   Ht 1.676 m (5' 6\")   Wt 73 kg (161 lb)   LMP  (LMP Unknown)   SpO2 96%   BMI 25.99 kg/m²     Assessment/Plan     Problem List Items Addressed This Visit    None  Visit Diagnoses       Right medial knee pain    -  Primary    Advised R.I.C.E. Use ACE wrap from below knee to mid/upper thigh. Tylenol PRN. Complete medrol dosepak. Flexeril PRN w/ caution. Refer to ortho.    Relevant Orders    Referral to Orthopaedic Surgery          Avoid NSAIDs d/t cardiac history.   "

## 2025-02-10 NOTE — PROGRESS NOTES
Chief Complaint   Patient presents with    Right Knee - New Patient Visit     Xrays today     History of Present Illness:  Antonia Rodriguez is a 70 y.o. female presenting to clinic as a new patient  for her right knee. She reports that her knee has been bothering her for the last two weeks. She denies any discreet injury or trauma. She has pain with specific movements. She has been diagnosed with low back pain and sciatica in the right leg in the past. She did not get much relief with oral steroids in the past.     Imaging:  X-rays right knee: Shows no acute fractures or dislocations. Shows mild medial compartment arthritis grade 1/2.      Assessment:   Right knee arthritis flare     Plan:  We reviewed the role of imaging, physical therapy, injections and the time frame to healing and correlation with outcome.  Right knee cortisone injection today.   NSAID: Ibuprofen consistently for one week then as needed. GI side effects and medical risks discussed.  Ice: 60 minutes on and off  Knee brace as needed  Exercise home program: Medically directed knee therapy / Handout given.   Follow-up in 4 weeks. If no better we will get a MRI.      L Inj/Asp: R knee on 2/11/2025 10:04 AM  Indications: pain  Details: 22 G needle, anterolateral approach  Medications: 8 mL lidocaine 10 mg/mL (1 %); 40 mg triamcinolone acetonide 40 mg/mL  Outcome: tolerated well, no immediate complications  Procedure, treatment alternatives, risks and benefits explained, specific risks discussed. Consent was given by the patient. Immediately prior to procedure a time out was called to verify the correct patient, procedure, equipment, support staff and site/side marked as required. Patient was prepped and draped in the usual sterile fashion.          Physical Exam:  Right Knee:  ROM: Flexion to 120  Pain along medial joint line   Positive Rosario´s test/PositiveApley Grind  Neurovascular exam normal distally  Palpable pedal pulse and good cap refill       Review of Systems:  GENERAL: Negative for malaise, significant weight loss, fever  MUSCULOSKELETAL: See HPI  NEURO:  Negative     Past Medical History:   Diagnosis Date    Arterial stent thrombosis, initial encounter ([facility])     7 stents - pt has defiberilator    CHF (congestive heart failure) November 16 2021    Coronary artery disease     Decreased range of motion (ROM) of shoulder 12/13/2023    Diabetes (Multi)     Heart disease 11/16/2021    Heart failure     Hypertension 11/16/2021    Left shoulder pain 02/19/2023    Ddx includes adhesive capsulitis. AC joint osteoarthritis rotator cuff tendinitis, biceps tendinitis, and deltoid tendinitis given the various positive physical exam findings. May be related to upper arm usage during cardiac rehab. Limited treatment options given DM and use of blood thinners. Provided AAOS shoulder exercises and stretches. Recommend using of heating pads, following by exercises, f    Myocardial infarction (Multi) 01/03/2024    Comment on above: STEMI    Pacemaker     Personal history of other medical treatment     History of echocardiogram    Sleep apnea     Ulcer of toe of left foot (Multi) 08/02/2022    Ulcer of toe of left foot, with fat layer exposed (Multi) 06/09/2023    Unstable angina pectoris (Multi) 07/07/2022       Medication Documentation Review Audit       Reviewed by Abiola Pretty MA (Medical Assistant) on 02/07/25 at 1200      Medication Order Taking? Sig Documenting Provider Last Dose Status   ascorbic acid (Vitamin C) 500 mg tablet [id number] Yes Take 1 tablet (500 mg) by mouth once daily. Historical Provider, MD  Active   aspirin 81 mg EC tablet [id number] Yes TAKE 1 TABLET BY MOUTH DAILY Juan Boyer MD  Active   atorvastatin (Lipitor) 80 mg tablet [id number] Yes Take 1 tablet (80 mg) by mouth once daily at bedtime. Nakita Denise, APRN-CNP  Active   cholecalciferol, vitamin D3, (VITAMIN D3 ORAL) [id number] Yes Take by mouth once daily. Unknown dose  Historical Provider, MD  Active   cyanocobalamin (Vitamin B-12) 100 mcg tablet [id number] Yes Take 1 tablet (100 mcg) by mouth once daily. Historical Provider, MD  Active   cyclobenzaprine (Flexeril) 5 mg tablet [id number]  Take 1 tablet (5 mg) by mouth once daily at bedtime for 7 days.   Patient not taking: Reported on 2/7/2025    Ana Gaming MD  Active   dapagliflozin propanediol (Farxiga) 10 mg [id number] Yes Take 1 tablet (10 mg) by mouth once daily. Juan Boyer MD  Active   docusate sodium (Colace) 100 mg capsule [id number] Yes Take 1 capsule (100 mg) by mouth 2 times a day.   Patient taking differently: Take 1 capsule (100 mg) by mouth if needed.    AYE Denise  Active   furosemide (Lasix) 40 mg tablet [id number] Yes Take 1 tablet (40 mg) by mouth once daily as needed (For lower extremity edema). AYE Denise  Active   isosorbide mononitrate ER (Imdur) 60 mg 24 hr tablet [id number] Yes TAKE 1 TABLET BY MOUTH DAILY Juan Boyer MD  Active   methylPREDNISolone (Medrol Dospak) 4 mg tablets [id number] Yes Take as directed on package. Ana Gaming MD  Active   metoprolol succinate XL (Toprol-XL) 25 mg 24 hr tablet [id number] Yes Take 1 tablet (25 mg) by mouth once daily. Juan Boyer MD  Active   nitroglycerin (Nitrostat) 0.4 mg SL tablet [id number] Yes DISSOLVE 1 TABLET UNDER THE TONGUE AS NEEDED FOR CHEST PAIN-  MAY REPEAT EVERY 5 MINUTES IF NEEDED ( MAX 3 DOSES.- IF NO RELIEF CALL 911) AYE Denise  Active   pantoprazole (ProtoNix) 40 mg EC tablet [id number] Yes Take 1 tablet (40 mg) by mouth once daily as needed. Do not crush, chew, or split. Ana Albert MD  Active   sacubitriL-valsartan (Entresto) 24-26 mg tablet [id number] Yes Take 1 tablet by mouth 2 times a day. Juan Boyer MD  Active   sotalol (Betapace) 120 mg tablet [id number] Yes TAKE 1 TABLET BY MOUTH TWICE DAILY Clement Perez MD  Active   tirzepatide (Mounjaro) 12.5 mg/0.5 mL pen injector  [id number] Yes Inject 1 pen (12.5 mg) under the skin 1 (one) time per week. Nakita Fonsecaab, APRN-CNP  Active                    Allergies   Allergen Reactions    Iodinated Contrast Media Hives    Penicillins Hives     Years ago       Social History     Socioeconomic History    Marital status:      Spouse name: Not on file    Number of children: Not on file    Years of education: Not on file    Highest education level: Not on file   Occupational History    Not on file   Tobacco Use    Smoking status: Former     Current packs/day: 0.00     Average packs/day: 1 pack/day for 36.8 years (36.8 ttl pk-yrs)     Types: Cigarettes     Start date: 1/16/1985     Quit date: 11/16/2021     Years since quitting: 3.2    Smokeless tobacco: Never    Tobacco comments:     Stop 11 /16/2021 no urge to restart   Vaping Use    Vaping status: Never Used   Substance and Sexual Activity    Alcohol use: Never     Alcohol/week: 3.0 standard drinks of alcohol     Types: 1 Glasses of wine, 2 Cans of beer per week     Comment: occasional    Drug use: Not Currently     Types: Marijuana    Sexual activity: Not Currently     Partners: Male     Birth control/protection: Abstinence, None   Other Topics Concern    Not on file   Social History Narrative    Not on file     Social Drivers of Health     Financial Resource Strain: High Risk (10/9/2024)    Received from [facility] SDOH Screening     In the past year, have you been unable to get any of the following when you really needed them? choose all that apply.: Internet   Food Insecurity: High Risk (12/5/2024)    Received from [facility] SDOH Screening     In the past 2 months, did you or others you live with eat smaller meals or skip meals because you didn't have money for food?: Yes   Transportation Needs: No Transportation Needs (12/18/2023)    PRAPARE - Transportation     Lack of Transportation (Medical): No     Lack of Transportation (Non-Medical): No   Physical  Activity: Sufficiently Active (2023)    Exercise Vital Sign     Days of Exercise per Week: 5 days     Minutes of Exercise per Session: 90 min   Stress: No Stress Concern Present (2023)    Citizen of [place] [employer] Occupational [employer] - Occupational Stress Questionnaire     Feeling of Stress : Not at all   Social Connections: Moderately Isolated (2023)    Social Connection and Isolation Panel [NHANES]     Frequency of Communication with Friends and Family: More than three times a week     Frequency of Social Gatherings with Friends and Family: Three times a week     Attends Restorationism Services: More than 4 times per year     Active Member of Clubs or Organizations: No     Attends Club or Organization Meetings: Never     Marital Status:    Intimate Partner Violence: Not At Risk (2023)    Humiliation, Afraid, Rape, and Kick questionnaire     Fear of Current or Ex-Partner: No     Emotionally Abused: No     Physically Abused: No     Sexually Abused: No   Housing Stability: Low Risk  (2023)    Housing Stability Vital Sign     Unable to Pay for Housing in the Last Year: No     Number of Places Lived in the Last Year: 1     Unstable Housing in the Last Year: No       Past Surgical History:   Procedure Laterality Date    CARDIAC CATHETERIZATION N/A 2023    Procedure: Left Heart Cath;  Surgeon: Juan Boyer MD;  Location: [facility] Cardiac Cath Lab;  Service: Cardiovascular;  Laterality: N/A;    CARDIAC CATHETERIZATION N/A 2023    Procedure: IFR (Instant Wave Free Ration);  Surgeon: Juan Boyer MD;  Location: [facility] Cardiac Cath Lab;  Service: Cardiovascular;  Laterality: N/A;     SECTION, LOW TRANSVERSE  1976  and 1979    CORONARY ANGIOPLASTY      CORONARY STENT PLACEMENT      INSERT / REPLACE / REMOVE PACEMAKER      OTHER SURGICAL HISTORY  2022    Cardiac catheterization with stent placement    OTHER SURGICAL HISTORY  2022    Cardioverter  defibrillator insertion       XR lumbar spine complete 4+ views    Result Date: 2/4/2025  Interpreted By:  Emil Greco, STUDY: XR LUMBAR SPINE COMPLETE 4+ VIEWS   INDICATION: Signs/Symptoms:right back pain.   COMPARISON: Reconstruction images from a CT of the chest and abdomen performed June 7, 2024   ACCESSION NUMBER(S): [id number]   ORDERING CLINICIAN: MARILY CORTEZ   FINDINGS: Previous vertebral compression fracture at L1 similar to prior.   Moderate multilevel lumbar degenerative changes are grossly similar to the prior exam.   There is no evidence of new fracture.       Moderate lumbar degenerative changes similar to the prior study.   Remote L1 vertebral compression fracture.   Signed by: Emil Greco 2/4/2025 5:59 PM Dictation workstation:   [id number]    XR hip right with pelvis when performed 4+ views    Result Date: 2/4/2025  Interpreted By:  Emil Greco, STUDY: XR HIP RIGHT WITH PELVIS WHEN PERFORMED 4+ VIEWS   INDICATION: Signs/Symptoms:right hip pain.   COMPARISON: None   ACCESSION NUMBER(S): [id number]   ORDERING CLINICIAN: MARILY CORTEZ   FINDINGS: Mild osteoarthritis bilateral hips. Trochanteric spurs. No evidence of fracture. Bilateral sacroiliac osteoarthritis.       Mild degenerative changes. No acute findings.   Signed by: Emil Greco 2/4/2025 5:59 PM Dictation workstation:   [id number]         Scribe Attestation  By signing my name below, Leny ROBBINS Scribmarito   attest that this documentation has been prepared under the direction and in the presence of Douglas Moran MD.

## 2025-02-11 ENCOUNTER — OFFICE VISIT (OUTPATIENT)
Dept: ORTHOPEDIC SURGERY | Facility: CLINIC | Age: 70
End: 2025-02-11
Payer: COMMERCIAL

## 2025-02-11 ENCOUNTER — HOSPITAL ENCOUNTER (OUTPATIENT)
Dept: RADIOLOGY | Facility: CLINIC | Age: 70
Discharge: HOME | End: 2025-02-11
Payer: COMMERCIAL

## 2025-02-11 DIAGNOSIS — M25.561 RIGHT KNEE PAIN, UNSPECIFIED CHRONICITY: ICD-10-CM

## 2025-02-11 DIAGNOSIS — M17.10 ARTHRITIS OF KNEE: Primary | ICD-10-CM

## 2025-02-11 DIAGNOSIS — M25.561 RIGHT MEDIAL KNEE PAIN: ICD-10-CM

## 2025-02-11 PROCEDURE — 20610 DRAIN/INJ JOINT/BURSA W/O US: CPT | Mod: RT | Performed by: ORTHOPAEDIC SURGERY

## 2025-02-11 PROCEDURE — 99204 OFFICE O/P NEW MOD 45 MIN: CPT | Performed by: ORTHOPAEDIC SURGERY

## 2025-02-11 PROCEDURE — 73564 X-RAY EXAM KNEE 4 OR MORE: CPT | Mod: RT

## 2025-02-11 PROCEDURE — 1123F ACP DISCUSS/DSCN MKR DOCD: CPT | Performed by: ORTHOPAEDIC SURGERY

## 2025-02-11 PROCEDURE — L1812 KO ELASTIC W/JOINTS PRE OTS: HCPCS | Performed by: ORTHOPAEDIC SURGERY

## 2025-02-11 PROCEDURE — 3044F HG A1C LEVEL LT 7.0%: CPT | Performed by: ORTHOPAEDIC SURGERY

## 2025-02-11 PROCEDURE — 2500000004 HC RX 250 GENERAL PHARMACY W/ HCPCS (ALT 636 FOR OP/ED): Performed by: ORTHOPAEDIC SURGERY

## 2025-02-11 PROCEDURE — 99214 OFFICE O/P EST MOD 30 MIN: CPT | Mod: 25 | Performed by: ORTHOPAEDIC SURGERY

## 2025-02-11 RX ORDER — LIDOCAINE HYDROCHLORIDE 10 MG/ML
8 INJECTION, SOLUTION INFILTRATION; PERINEURAL
Status: COMPLETED | OUTPATIENT
Start: 2025-02-11 | End: 2025-02-11

## 2025-02-11 RX ORDER — TRIAMCINOLONE ACETONIDE 40 MG/ML
40 INJECTION, SUSPENSION INTRA-ARTICULAR; INTRAMUSCULAR
Status: COMPLETED | OUTPATIENT
Start: 2025-02-11 | End: 2025-02-11

## 2025-02-11 RX ORDER — IBUPROFEN 800 MG/1
800 TABLET ORAL EVERY 8 HOURS PRN
Qty: 90 TABLET | Refills: 2 | Status: SHIPPED | OUTPATIENT
Start: 2025-02-11

## 2025-02-11 RX ADMIN — TRIAMCINOLONE ACETONIDE 40 MG: 40 INJECTION, SUSPENSION INTRA-ARTICULAR; INTRAMUSCULAR at 10:04

## 2025-02-11 RX ADMIN — LIDOCAINE HYDROCHLORIDE 8 ML: 10 INJECTION, SOLUTION INFILTRATION; PERINEURAL at 10:04

## 2025-02-13 ENCOUNTER — APPOINTMENT (OUTPATIENT)
Dept: PHARMACY | Facility: HOSPITAL | Age: 70
End: 2025-02-13
Payer: COMMERCIAL

## 2025-02-13 DIAGNOSIS — E11.9 CONTROLLED TYPE 2 DIABETES MELLITUS WITHOUT COMPLICATION, WITHOUT LONG-TERM CURRENT USE OF INSULIN (MULTI): ICD-10-CM

## 2025-02-14 PROCEDURE — RXMED WILLOW AMBULATORY MEDICATION CHARGE

## 2025-02-14 RX ORDER — TIRZEPATIDE 12.5 MG/.5ML
12.5 INJECTION, SOLUTION SUBCUTANEOUS
Qty: 6 ML | Refills: 1 | Status: SHIPPED | OUTPATIENT
Start: 2025-02-16

## 2025-02-14 NOTE — PROGRESS NOTES
"Pharmacy Post-Discharge Visit  Antonia Rodriguez \"Maryjo\" is a 70 y.o. female was referred to Clinical Pharmacy Team to complete a post-discharge medication optimization and monitoring visit.  The patient was referred for their diabetes, COPD and HF.    Admission Date: 2023  Discharge Date: 2023    Referring Provider: AYE Denise    Subjective   Allergies   Allergen Reactions    Iodinated Contrast Media Hives    Penicillins Hives     Years ago       [id number] - [place], [place] - [address]  [address] [facility] [address]  Phone: [phone] Fax: [phone]    [facility]  [address]  [facility] [address]  Phone: [phone] Fax: [phone]    Social History     Social History Narrative    Not on file     Notable Medication changes following discharge:  -none    Diabetes  She presents for her follow-up diabetic visit. She has type 2 diabetes mellitus. There are no hypoglycemic associated symptoms. There are no hypoglycemic complications. Current diabetic treatment includes oral agent (monotherapy) (and weekly GLP-1).     Current DM Pharmacotherapy:   -Mounjaro 12.5 mg weekly  -Farxiga 10mg daily    SECONDARY PREVENTION  - Statin? Yes  - ACE-I/ARB? No  - Aspirin? Yes    Current monitoring regimen:   Patient is using: glucometer    B to 120    Any episodes of hypoglycemia? No  Hypoglycemia awareness? No    Pertinent PMH Review:  - PMH of Pancreatitis: No  - PMH/FH of Medullary Thyroid Cancer: No  - PMH of Retinopathy: No  - PMH of Urinary Tract Infections: No    CHF Assessment    Symptom/Staging:  -Most recent ejection fraction: 55-60%  -Weight changes?: No    Guideline-Directed Medical Therapy:  -ARNI: Yes, describe: Entresto 24-26  -Beta Blocker: Yes, describe: sotalol  -MRA: Yes, describe: spironolactone 25mg   -SGLT2i: Yes, describe: farxiga 10mg daily    Secondary Prevention:  -The ASCVD Risk score (Bibiana DK, et " al., 2019) failed to calculate for the following reasons:    Risk score cannot be calculated because patient has a medical history suggesting prior/existing ASCVD   -Aspirin 81mg? yes  -Statin?: Yes, describe: atorvastatin 80mg nightly  -HTN?: Yes, describe: Entresto 24-26 bid    BP: none to report    Objective     LMP  (LMP Unknown)      LAB  Lab Results   Component Value Date    BILITOT 0.6 2024    CALCIUM 9.6 2024    CO2 26 2024     2024    CREATININE 0.88 2024    GLUCOSE 165 (H) 2024    ALKPHOS 145 (H) 2024    K 4.4 2024    PROT 6.9 2024     2024    AST 17 2024    ALT 15 2024    BUN 22 2024    ANIONGAP 15 2024    MG 2.32 2024    PHOS 4.0 2022    ALBUMIN 4.2 2024    EGFR 71 2024     Lab Results   Component Value Date    TRIG 85 2024    CHOL 101 2024    LDLCALC 37 2024    HDL 47.4 2024     Lab Results   Component Value Date    HGBA1C 6.2 (H) 2025     Current Outpatient Medications on File Prior to Visit   Medication Sig Dispense Refill    ascorbic acid (Vitamin C) 500 mg tablet Take 1 tablet (500 mg) by mouth once daily.      aspirin 81 mg EC tablet TAKE 1 TABLET BY MOUTH DAILY 90 tablet 3    atorvastatin (Lipitor) 80 mg tablet Take 1 tablet (80 mg) by mouth once daily at bedtime. 90 tablet 3    cholecalciferol, vitamin D3, (VITAMIN D3 ORAL) Take by mouth once daily. Unknown dose      cyanocobalamin (Vitamin B-12) 100 mcg tablet Take 1 tablet (100 mcg) by mouth once daily.      [] cyclobenzaprine (Flexeril) 5 mg tablet Take 1 tablet (5 mg) by mouth once daily at bedtime for 7 days. (Patient not taking: Reported on 2025) 7 tablet 0    dapagliflozin propanediol (Farxiga) 10 mg Take 1 tablet (10 mg) by mouth once daily. 90 tablet 3    docusate sodium (Colace) 100 mg capsule Take 1 capsule (100 mg) by mouth 2 times a day. (Patient taking differently: Take 1  capsule (100 mg) by mouth if needed.) 60 capsule 5    furosemide (Lasix) 40 mg tablet Take 1 tablet (40 mg) by mouth once daily as needed (For lower extremity edema). 20 tablet 1    ibuprofen 800 mg tablet Take 1 tablet (800 mg) by mouth every 8 hours if needed for mild pain (1 - 3). 90 tablet 2    isosorbide mononitrate ER (Imdur) 60 mg 24 hr tablet TAKE 1 TABLET BY MOUTH DAILY 90 tablet 3    [] methylPREDNISolone (Medrol Dospak) 4 mg tablets Take as directed on package. 21 tablet 0    metoprolol succinate XL (Toprol-XL) 25 mg 24 hr tablet Take 1 tablet (25 mg) by mouth once daily. 30 tablet 11    nitroglycerin (Nitrostat) 0.4 mg SL tablet DISSOLVE 1 TABLET UNDER THE TONGUE AS NEEDED FOR CHEST PAIN-  MAY REPEAT EVERY 5 MINUTES IF NEEDED ( MAX 3 DOSES.- IF NO RELIEF CALL 911) 25 tablet 3    pantoprazole (ProtoNix) 40 mg EC tablet Take 1 tablet (40 mg) by mouth once daily as needed. Do not crush, chew, or split.      sacubitriL-valsartan (Entresto) 24-26 mg tablet Take 1 tablet by mouth 2 times a day.      sotalol (Betapace) 120 mg tablet TAKE 1 TABLET BY MOUTH TWICE DAILY 180 tablet 2    tirzepatide (Mounjaro) 12.5 mg/0.5 mL pen injector Inject 1 pen (12.5 mg) under the skin 1 (one) time per week. 6 mL 1     No current facility-administered medications on file prior to visit.     Assessment/Plan   Patient reports she is still doing well with Mounjaro. Reports weight loss is down to 161 lbs and readings have been mostly in goal. She was put on a medrol dose pack for arthritis pain and reports her BG has been all over the place over the past week, but will be done with steroid soon. Will continue patient at current dose and follow up in 12 weeks.    Plan:  CONTINUE Mounjaro to 12.5 mg weekly  Follow up in 12 weeks to review BG    Danae BrushD, [facility]    Verbal consent to manage patient's drug therapy was obtained from the patient. They were informed they may decline to participate or withdraw from  participation in pharmacy services at any time.

## 2025-02-19 ENCOUNTER — PHARMACY VISIT (OUTPATIENT)
Dept: PHARMACY | Facility: CLINIC | Age: 70
End: 2025-02-19
Payer: COMMERCIAL

## 2025-02-24 ENCOUNTER — TELEPHONE (OUTPATIENT)
Dept: ORTHOPEDIC SURGERY | Facility: CLINIC | Age: 70
End: 2025-02-24
Payer: COMMERCIAL

## 2025-02-24 ENCOUNTER — HOSPITAL ENCOUNTER (EMERGENCY)
Facility: HOSPITAL | Age: 70
Discharge: HOME | End: 2025-02-24
Payer: COMMERCIAL

## 2025-02-24 VITALS
OXYGEN SATURATION: 98 % | HEART RATE: 75 BPM | HEIGHT: 66 IN | RESPIRATION RATE: 18 BRPM | SYSTOLIC BLOOD PRESSURE: 109 MMHG | WEIGHT: 165 LBS | DIASTOLIC BLOOD PRESSURE: 66 MMHG | TEMPERATURE: 98.1 F | BODY MASS INDEX: 26.52 KG/M2

## 2025-02-24 DIAGNOSIS — M25.561 RIGHT KNEE PAIN, UNSPECIFIED CHRONICITY: Primary | ICD-10-CM

## 2025-02-24 PROCEDURE — 2500000001 HC RX 250 WO HCPCS SELF ADMINISTERED DRUGS (ALT 637 FOR MEDICARE OP): Performed by: PHYSICIAN ASSISTANT

## 2025-02-24 PROCEDURE — 99284 EMERGENCY DEPT VISIT MOD MDM: CPT | Mod: 25

## 2025-02-24 PROCEDURE — 96361 HYDRATE IV INFUSION ADD-ON: CPT

## 2025-02-24 PROCEDURE — 96360 HYDRATION IV INFUSION INIT: CPT

## 2025-02-24 PROCEDURE — 99284 EMERGENCY DEPT VISIT MOD MDM: CPT | Performed by: PHYSICIAN ASSISTANT

## 2025-02-24 PROCEDURE — 2500000004 HC RX 250 GENERAL PHARMACY W/ HCPCS (ALT 636 FOR OP/ED): Performed by: PHYSICIAN ASSISTANT

## 2025-02-24 RX ORDER — CYCLOBENZAPRINE HCL 5 MG
5 TABLET ORAL 3 TIMES DAILY PRN
Qty: 15 TABLET | Refills: 0 | Status: SHIPPED | OUTPATIENT
Start: 2025-02-24 | End: 2025-03-01

## 2025-02-24 RX ORDER — CYCLOBENZAPRINE HCL 10 MG
10 TABLET ORAL ONCE
Status: COMPLETED | OUTPATIENT
Start: 2025-02-24 | End: 2025-02-24

## 2025-02-24 RX ORDER — ACETAMINOPHEN 500 MG
500 TABLET ORAL EVERY 6 HOURS PRN
Qty: 30 TABLET | Refills: 0 | Status: SHIPPED | OUTPATIENT
Start: 2025-02-24 | End: 2025-03-06

## 2025-02-24 RX ORDER — ACETAMINOPHEN 325 MG/1
975 TABLET ORAL ONCE
Status: COMPLETED | OUTPATIENT
Start: 2025-02-24 | End: 2025-02-24

## 2025-02-24 RX ORDER — IBUPROFEN 600 MG/1
600 TABLET ORAL ONCE
Status: COMPLETED | OUTPATIENT
Start: 2025-02-24 | End: 2025-02-24

## 2025-02-24 RX ORDER — OXYCODONE HYDROCHLORIDE 5 MG/1
5 TABLET ORAL ONCE
Status: DISCONTINUED | OUTPATIENT
Start: 2025-02-24 | End: 2025-02-24

## 2025-02-24 RX ADMIN — SODIUM CHLORIDE, POTASSIUM CHLORIDE, SODIUM LACTATE AND CALCIUM CHLORIDE 1000 ML: 600; 310; 30; 20 INJECTION, SOLUTION INTRAVENOUS at 14:01

## 2025-02-24 RX ADMIN — CYCLOBENZAPRINE 10 MG: 10 TABLET, FILM COATED ORAL at 15:52

## 2025-02-24 RX ADMIN — IBUPROFEN 600 MG: 600 TABLET, FILM COATED ORAL at 13:50

## 2025-02-24 RX ADMIN — ACETAMINOPHEN 975 MG: 325 TABLET ORAL at 13:50

## 2025-02-24 ASSESSMENT — PAIN SCALES - GENERAL
PAINLEVEL_OUTOF10: 10 - WORST POSSIBLE PAIN
PAINLEVEL_OUTOF10: 8
PAINLEVEL_OUTOF10: 10 - WORST POSSIBLE PAIN

## 2025-02-24 ASSESSMENT — LIFESTYLE VARIABLES
EVER HAD A DRINK FIRST THING IN THE MORNING TO STEADY YOUR NERVES TO GET RID OF A HANGOVER: NO
HAVE YOU EVER FELT YOU SHOULD CUT DOWN ON YOUR DRINKING: NO
HAVE PEOPLE ANNOYED YOU BY CRITICIZING YOUR DRINKING: NO
EVER FELT BAD OR GUILTY ABOUT YOUR DRINKING: NO
TOTAL SCORE: 0

## 2025-02-24 ASSESSMENT — COLUMBIA-SUICIDE SEVERITY RATING SCALE - C-SSRS
2. HAVE YOU ACTUALLY HAD ANY THOUGHTS OF KILLING YOURSELF?: NO
1. IN THE PAST MONTH, HAVE YOU WISHED YOU WERE DEAD OR WISHED YOU COULD GO TO SLEEP AND NOT WAKE UP?: NO
6. HAVE YOU EVER DONE ANYTHING, STARTED TO DO ANYTHING, OR PREPARED TO DO ANYTHING TO END YOUR LIFE?: NO

## 2025-02-24 ASSESSMENT — PAIN DESCRIPTION - DIRECTION: RADIATING_TOWARDS: R HIP

## 2025-02-24 ASSESSMENT — PAIN DESCRIPTION - LOCATION
LOCATION: KNEE
LOCATION: LEG
LOCATION: KNEE

## 2025-02-24 ASSESSMENT — PAIN DESCRIPTION - ORIENTATION
ORIENTATION: RIGHT

## 2025-02-24 ASSESSMENT — PAIN - FUNCTIONAL ASSESSMENT: PAIN_FUNCTIONAL_ASSESSMENT: 0-10

## 2025-02-24 NOTE — DISCHARGE INSTRUCTIONS
Follow-up for your scheduled appoint with Dr. Moran tomorrow, do not drive or operate heavy machinery while taking muscle relaxer Flexeril.

## 2025-02-24 NOTE — PROGRESS NOTES
No chief complaint on file.    History of Present Illness:  2/25/25: We gave her a cortisone injection into the right knee on 2/11/25.     2/11/25: Antonia Rodriguez is a 70 y.o. female presenting to clinic as a new patient  for her right knee. She reports that her knee has been bothering her for the last two weeks. She denies any discreet injury or trauma. She has pain with specific movements. She has been diagnosed with low back pain and sciatica in the right leg in the past. She did not get much relief with oral steroids in the past.     Imaging:  X-rays right knee: Shows no acute fractures or dislocations. Shows mild medial compartment arthritis grade 1/2.      Assessment:   Right knee arthritis flare     Plan:  We reviewed the role of imaging, physical therapy, injections and the time frame to healing and correlation with outcome.  Right knee cortisone injection today.   NSAID: Ibuprofen consistently for one week then as needed. GI side effects and medical risks discussed.  Ice: 60 minutes on and off  Knee brace as needed  Exercise home program: Medically directed knee therapy / Handout given.   Follow-up in 4 weeks. If no better we will get a MRI.      Physical Exam:  Right Knee:  ROM: Flexion to 120  Pain along medial joint line   Positive Rosario´s test/PositiveApley Grind  Neurovascular exam normal distally  Palpable pedal pulse and good cap refill      Review of Systems:  GENERAL: Negative for malaise, significant weight loss, fever  MUSCULOSKELETAL: See HPI  NEURO:  Negative     Past Medical History:   Diagnosis Date    Arterial stent thrombosis, initial encounter ([facility])     7 stents - pt has defiberilator    CHF (congestive heart failure) November 16 2021    Coronary artery disease     Decreased range of motion (ROM) of shoulder 12/13/2023    Diabetes (Multi)     Heart disease 11/16/2021    Heart failure     Hypertension 11/16/2021    Left shoulder pain 02/19/2023    Ddx includes adhesive  capsulitis. AC joint osteoarthritis rotator cuff tendinitis, biceps tendinitis, and deltoid tendinitis given the various positive physical exam findings. May be related to upper arm usage during cardiac rehab. Limited treatment options given DM and use of blood thinners. Provided AAOS shoulder exercises and stretches. Recommend using of heating pads, following by exercises, f    Myocardial infarction (Multi) 01/03/2024    Comment on above: STEMI    Pacemaker     Personal history of other medical treatment     History of echocardiogram    Sleep apnea     Ulcer of toe of left foot (Multi) 08/02/2022    Ulcer of toe of left foot, with fat layer exposed (Multi) 06/09/2023    Unstable angina pectoris (Multi) 07/07/2022       Medication Documentation Review Audit       Reviewed by Abiola Pretty MA (Medical Assistant) on 02/07/25 at 1200      Medication Order Taking? Sig Documenting Provider Last Dose Status   ascorbic acid (Vitamin C) 500 mg tablet [id number] Yes Take 1 tablet (500 mg) by mouth once daily. Historical Provider, MD  Active   aspirin 81 mg EC tablet [id number] Yes TAKE 1 TABLET BY MOUTH DAILY Juan Boyer MD  Active   atorvastatin (Lipitor) 80 mg tablet [id number] Yes Take 1 tablet (80 mg) by mouth once daily at bedtime. Nakita Denise, APRN-CNP  Active   cholecalciferol, vitamin D3, (VITAMIN D3 ORAL) [id number] Yes Take by mouth once daily. Unknown dose Historical Provider, MD  Active   cyanocobalamin (Vitamin B-12) 100 mcg tablet [id number] Yes Take 1 tablet (100 mcg) by mouth once daily. Historical Provider, MD  Active   cyclobenzaprine (Flexeril) 5 mg tablet [id number]  Take 1 tablet (5 mg) by mouth once daily at bedtime for 7 days.   Patient not taking: Reported on 2/7/2025    Ana Gaming MD  Active   dapagliflozin propanediol (Farxiga) 10 mg [id number] Yes Take 1 tablet (10 mg) by mouth once daily. Juan Boyer MD  Active   docusate sodium (Colace) 100 mg capsule [id number] Yes Take 1 capsule (100  mg) by mouth 2 times a day.   Patient taking differently: Take 1 capsule (100 mg) by mouth if needed.    AYE Denise  Active   furosemide (Lasix) 40 mg tablet [id number] Yes Take 1 tablet (40 mg) by mouth once daily as needed (For lower extremity edema). AYE Denise  Active   isosorbide mononitrate ER (Imdur) 60 mg 24 hr tablet [id number] Yes TAKE 1 TABLET BY MOUTH DAILY Juan Boyer MD  Active   methylPREDNISolone (Medrol Dospak) 4 mg tablets [id number] Yes Take as directed on package. Ana Gaming MD  Active   metoprolol succinate XL (Toprol-XL) 25 mg 24 hr tablet [id number] Yes Take 1 tablet (25 mg) by mouth once daily. Juan Boyer MD  Active   nitroglycerin (Nitrostat) 0.4 mg SL tablet [id number] Yes DISSOLVE 1 TABLET UNDER THE TONGUE AS NEEDED FOR CHEST PAIN-  MAY REPEAT EVERY 5 MINUTES IF NEEDED ( MAX 3 DOSES.- IF NO RELIEF CALL 911) AYE Denise  Active   pantoprazole (ProtoNix) 40 mg EC tablet [id number] Yes Take 1 tablet (40 mg) by mouth once daily as needed. Do not crush, chew, or split. Ana Albert MD  Active   sacubitriL-valsartan (Entresto) 24-26 mg tablet [id number] Yes Take 1 tablet by mouth 2 times a day. Juan Boyer MD  Active   sotalol (Betapace) 120 mg tablet [id number] Yes TAKE 1 TABLET BY MOUTH TWICE DAILY Clement Perez MD  Active   tirzepatide (Mounjaro) 12.5 mg/0.5 mL pen injector [id number] Yes Inject 1 pen (12.5 mg) under the skin 1 (one) time per week. AYE Denise  Active                    Allergies   Allergen Reactions    Iodinated Contrast Media Hives    Penicillins Hives     Years ago       Social History     Socioeconomic History    Marital status:      Spouse name: Not on file    Number of children: Not on file    Years of education: Not on file    Highest education level: Not on file   Occupational History    Not on file   Tobacco Use    Smoking status: Former     Current packs/day: 0.00     Average  packs/day: 1 pack/day for 36.8 years (36.8 ttl pk-yrs)     Types: Cigarettes     Start date: 1/16/1985     Quit date: 11/16/2021     Years since quitting: 3.2    Smokeless tobacco: Never    Tobacco comments:     Stop 11 /16/2021 no urge to restart   Vaping Use    Vaping status: Never Used   Substance and Sexual Activity    Alcohol use: Never     Alcohol/week: 3.0 standard drinks of alcohol     Types: 1 Glasses of wine, 2 Cans of beer per week     Comment: occasional    Drug use: Not Currently     Types: Marijuana    Sexual activity: Not Currently     Partners: Male     Birth control/protection: Abstinence, None   Other Topics Concern    Not on file   Social History Narrative    Not on file     Social Drivers of Health     Financial Resource Strain: High Risk (10/9/2024)    Received from [facility] SDOH Screening     In the past year, have you been unable to get any of the following when you really needed them? choose all that apply.: Internet   Food Insecurity: High Risk (12/5/2024)    Received from [facility] SDOH Screening     In the past 2 months, did you or others you live with eat smaller meals or skip meals because you didn't have money for food?: Yes   Transportation Needs: No Transportation Needs (12/18/2023)    PRAPARE - Transportation     Lack of Transportation (Medical): No     Lack of Transportation (Non-Medical): No   Physical Activity: Sufficiently Active (12/18/2023)    Exercise Vital Sign     Days of Exercise per Week: 5 days     Minutes of Exercise per Session: 90 min   Stress: No Stress Concern Present (12/18/2023)    [place] [employer] - Occupational Stress Questionnaire     Feeling of Stress : Not at all   Social Connections: Moderately Isolated (12/18/2023)    Social Connection and Isolation Panel [NHANES]     Frequency of Communication with Friends and Family: More than three times a week     Frequency of Social Gatherings with Friends and Family:  Three times a week     Attends Muslim Services: More than 4 times per year     Active Member of Clubs or Organizations: No     Attends Club or Organization Meetings: Never     Marital Status:    Intimate Partner Violence: Not At Risk (2023)    Humiliation, Afraid, Rape, and Kick questionnaire     Fear of Current or Ex-Partner: No     Emotionally Abused: No     Physically Abused: No     Sexually Abused: No   Housing Stability: Low Risk  (2023)    Housing Stability Vital Sign     Unable to Pay for Housing in the Last Year: No     Number of Places Lived in the Last Year: 1     Unstable Housing in the Last Year: No       Past Surgical History:   Procedure Laterality Date    CARDIAC CATHETERIZATION N/A 2023    Procedure: Left Heart Cath;  Surgeon: Juan Boyer MD;  Location: [facility] Cardiac [facility] Lab;  Service: Cardiovascular;  Laterality: N/A;    CARDIAC CATHETERIZATION N/A 2023    Procedure: IFR (Instant Wave Free Ration);  Surgeon: Juan Boyer MD;  Location: [facility] Cardiac Cath Lab;  Service: Cardiovascular;  Laterality: N/A;     SECTION, LOW TRANSVERSE  1976  and 1979    CORONARY ANGIOPLASTY      CORONARY STENT PLACEMENT      INSERT / REPLACE / REMOVE PACEMAKER      OTHER SURGICAL HISTORY  2022    Cardiac catheterization with stent placement    OTHER SURGICAL HISTORY  2022    Cardioverter defibrillator insertion       XR lumbar spine complete 4+ views    Result Date: 2025  Interpreted By:  Emil Greco, STUDY: XR LUMBAR SPINE COMPLETE 4+ VIEWS   INDICATION: Signs/Symptoms:right back pain.   COMPARISON: Reconstruction images from a CT of the chest and abdomen performed 2024   ACCESSION NUMBER(S): [id number]   ORDERING CLINICIAN: MARILY CORTEZ   FINDINGS: Previous vertebral compression fracture at L1 similar to prior.   Moderate multilevel lumbar degenerative changes are grossly similar to the prior exam.   There is no evidence of  new fracture.       Moderate lumbar degenerative changes similar to the prior study.   Remote L1 vertebral compression fracture.   Signed by: Emil Greco 2/4/2025 5:59 PM Dictation workstation:   [id number]    XR hip right with pelvis when performed 4+ views    Result Date: 2/4/2025  Interpreted By:  Emil Greco, STUDY: XR HIP RIGHT WITH PELVIS WHEN PERFORMED 4+ VIEWS   INDICATION: Signs/Symptoms:right hip pain.   COMPARISON: None   ACCESSION NUMBER(S): [id number]   ORDERING CLINICIAN: MARILY CORTEZ   FINDINGS: Mild osteoarthritis bilateral hips. Trochanteric spurs. No evidence of fracture. Bilateral sacroiliac osteoarthritis.       Mild degenerative changes. No acute findings.   Signed by: Emil Greco 2/4/2025 5:59 PM Dictation workstation:   [id number]         Scribe Attestation  By signing my name below, Leny ROBBINS Scrrylan   attest that this documentation has been prepared under the direction and in the presence of Douglas Moran MD.

## 2025-02-24 NOTE — ED PROVIDER NOTES
Emergency Department Provider Note        History of Present Illness     History provided by: Patient  Limitations to History: None  External Records Reviewed with Brief Summary:  Dr Moran's notes from 2/11    HPI:  Antonia Rodriguez is a 70 y.o. female with history of COPD, hypertension, coronary artery disease and chronic right knee pain, saw Dr. Moran with orthopedics on February 11, at that time had a steroid injection in the knee.  Patient states that this past weekend her pain started getting worse, atraumatic in nature, states that it radiates upwards, no calf swelling, no numbness or tingling.  Patient states she has been having to use her walker that she has at home where she normally ambulates independently.  Patient took a leftover Tylenol 3 from a previous injury and ibuprofen without relief.  Denies any falls injuries or traumas.    Physical Exam   Triage vitals:  T 36.7 °C (98.1 °F)  HR 83  /56  RR 18  O2 98 %      Physical Exam  Vitals and nursing note reviewed.   Constitutional:       General: She is not in acute distress.     Appearance: Normal appearance. She is not toxic-appearing.   HENT:      Head: Normocephalic and atraumatic.      Nose: Nose normal.   Eyes:      Extraocular Movements: Extraocular movements intact.   Cardiovascular:      Rate and Rhythm: Normal rate and regular rhythm.   Pulmonary:      Effort: Pulmonary effort is normal.   Abdominal:      Palpations: Abdomen is soft.   Musculoskeletal:         General: Normal range of motion.      Cervical back: Normal range of motion and neck supple.      Comments: Right knee appears normal without warmth redness or swelling, patient is able to flex and extend the knee, has a diffuse tenderness with palpation, no crepitus, palpable PT and DP pulses,    patient has full range of motion at the hip joint, knee joint and ankle joint without stiffness warmth or redness.   Skin:     General: Skin is warm and dry.   Neurological:       General: No focal deficit present.      Mental Status: She is alert.   Psychiatric:         Mood and Affect: Mood normal.         Thought Content: Thought content normal.        No orders to display     Labs Reviewed - No data to display    ED Course as of 25   6102 Patient reassessed at this time, blood pressure on repeat is 109/66 after fluids, she denies any symptoms of dizziness, lightheadedness, chest pain, shortness of breath, states that her blood pressure goes up and down, Dr. Boyer is aware of that notes why he took her off the Entresto, she has palpable pulses, no tenderness to the calf, it is all in the knee and is the same pain that brought her to see Dr. Moran on the .  Will trial muscle relaxer instead of oxycodone so as not to lower her blood pressure. [MC]   1609 Patient feeling much better, would like to go home, was able to be ambulated with a walker, has been at home, will be discharged per her request with Ortho follow-up with Dr. Moran tomorrow. [MC]      ED Course User Index  [MC] Myrtle Arcos PA-C         Diagnoses as of 25   Right knee pain, unspecified chronicity         Medical Decision Making & ED Course   Medical Decision Makin y.o. female presents today for evaluation of right knee pain, she just had x-rays on the  and saw an orthopedic specialist, she is actually scheduled to see him tomorrow, patient states that her pain is not well-controlled, this is clearly not a septic arthritis, she is neurovascularly intact, suspect that this is her arthritis flaring up, she has had no new falls injuries or traumas and I do not feel that a repeat x-ray is warranted, I did order her pain control including Tylenol ibuprofen and muscle relaxer, her initial blood pressure was on the low end so narcotic weren't given and when we rechecked it was 94/67, patient denies any symptoms related to this, states that she always runs this low in her and  her cardiologist, Dr. Boyer have been trying to adjust her Entresto as a result of this because sometimes she runs high and sometimes she is low like this.  She denies any symptoms related to this.  Patient is agreeable to getting a liter of fluids. her repeat blood pressure after fluids was 109/66, again patient is asymptomatic.  I will hold any narcotics so as not to drop her blood pressure.  Patient was ambulated with a walker which is what she has at home, is ambulating much better after pain control.  She has not normal pulses, no concerns for an ischemic limb or any acute abnormality that is life-threatening or requires any further workup or repeat imaging.  Will be discharged to follow-up with Dr. Moran tomorrow. She is accompanied by family who will drive her home.  ----      Differential diagnoses considered include but are not limited to: Arthritis, septic joint, soft tissue injury, ischemic limb, DVT, etc.     Social Determinants of Health which Significantly Impact Care: None identified     EKG Independent Interpretation: EKG not obtained    Chronic conditions affecting the patient's care: As documented above in [facility]    The patient was discussed with the following consultants/services: None    Care Considerations: As documented above in MDM    ED Course:  ED Course as of 02/24/25 2011 Mon Feb 24, 2025   1542 Patient reassessed at this time, blood pressure on repeat is 109/66 after fluids, she denies any symptoms of dizziness, lightheadedness, chest pain, shortness of breath, states that her blood pressure goes up and down, Dr. Boyer is aware of that notes why he took her off the Entresto, she has palpable pulses, no tenderness to the calf, it is all in the knee and is the same pain that brought her to see Dr. Moran on the 11th.  Will trial muscle relaxer instead of oxycodone so as not to lower her blood pressure. []   9064 Patient feeling much better, would like to go home, was able to be  ambulated with a walker, has been at home, will be discharged per her request with Ortho follow-up with Dr. Moran tomorrow. [MC]      ED Course User Index  [MC] Myrtle Arcos PA-C         Diagnoses as of 02/24/25 2011   Right knee pain, unspecified chronicity     Disposition   As a result of the work-up, the patient was discharged home.  she was informed of her diagnosis and instructed to come back with any concerns or worsening of condition.  she and was agreeable to the plan as discussed above.  she was given the opportunity to ask questions.  All of the patient's questions were answered.    Procedures   Procedures    Patient was seen independently    Myrtle Arcos PA-C  Emergency Medicine       Myrtle Arcos PA-C  02/24/25 2011

## 2025-02-24 NOTE — TELEPHONE ENCOUNTER
Called patient and expressed that if the pain is unbearable she can come into our walk in clinic or the hospital but we would still like her to make an appointment as soon as possible Patient understood.

## 2025-02-24 NOTE — TELEPHONE ENCOUNTER
Patient LVM stating she got a CSI in her Rt. Knee on 02/11/25 and that she cannot bear weight on her right leg now and cannot walk. Please advise

## 2025-02-25 ENCOUNTER — HOSPITAL ENCOUNTER (OUTPATIENT)
Dept: RADIOLOGY | Facility: CLINIC | Age: 70
Discharge: HOME | End: 2025-02-25
Payer: COMMERCIAL

## 2025-02-25 ENCOUNTER — APPOINTMENT (OUTPATIENT)
Dept: ORTHOPEDIC SURGERY | Facility: CLINIC | Age: 70
End: 2025-02-25
Payer: COMMERCIAL

## 2025-02-25 ENCOUNTER — OFFICE VISIT (OUTPATIENT)
Dept: ORTHOPEDIC SURGERY | Facility: CLINIC | Age: 70
End: 2025-02-25
Payer: COMMERCIAL

## 2025-02-25 ENCOUNTER — APPOINTMENT (OUTPATIENT)
Dept: PHYSICAL THERAPY | Facility: CLINIC | Age: 70
End: 2025-02-25
Payer: COMMERCIAL

## 2025-02-25 ENCOUNTER — APPOINTMENT (OUTPATIENT)
Dept: VASCULAR SURGERY | Facility: CLINIC | Age: 70
End: 2025-02-25
Payer: COMMERCIAL

## 2025-02-25 ENCOUNTER — TELEPHONE (OUTPATIENT)
Dept: ORTHOPEDIC SURGERY | Facility: CLINIC | Age: 70
End: 2025-02-25

## 2025-02-25 DIAGNOSIS — M25.562 ACUTE PAIN OF LEFT KNEE: Primary | ICD-10-CM

## 2025-02-25 DIAGNOSIS — M62.81 GENERALIZED MUSCLE WEAKNESS: ICD-10-CM

## 2025-02-25 DIAGNOSIS — M25.551 RIGHT HIP PAIN: ICD-10-CM

## 2025-02-25 DIAGNOSIS — M76.891 HIP FLEXOR TENDONITIS, RIGHT: Primary | ICD-10-CM

## 2025-02-25 DIAGNOSIS — R26.81 UNSTEADINESS ON FEET: ICD-10-CM

## 2025-02-25 PROCEDURE — 3044F HG A1C LEVEL LT 7.0%: CPT | Performed by: ORTHOPAEDIC SURGERY

## 2025-02-25 PROCEDURE — 1123F ACP DISCUSS/DSCN MKR DOCD: CPT | Performed by: ORTHOPAEDIC SURGERY

## 2025-02-25 PROCEDURE — 73502 X-RAY EXAM HIP UNI 2-3 VIEWS: CPT | Mod: RT

## 2025-02-25 PROCEDURE — 99214 OFFICE O/P EST MOD 30 MIN: CPT | Performed by: ORTHOPAEDIC SURGERY

## 2025-02-25 NOTE — PROGRESS NOTES
"Physical Therapy Reassessment      Patient Name: Antonia Rodriguez \"Maryjo\"  MRN: [id number]  Today's Date: 2/25/2025  Visit #: 16  Insurance: 2024 20% COINS, 240 DED (MET) 8850 OOP MAX, MC 90 DAY 2/26/2025, VS MED NEC, NO AUTH   Last Progress Note = 1/28   Visit Limit: Med Nec, 90 MC  Co-Pay: $20.00       1. Acute pain of left knee        2. Unsteadiness on feet        3. Generalized muscle weakness                ASSESSMENT:  Pt has maintained gains in functional mobility and balance ***, as demo'd on 5xSTS, 6MWT, mCTSIB and FGA standardized assessments.         GOALS:  By Discharge patient will demo:  [place] in HEP - in progress  No falls during POC - met  Improvement in the following outcome measures to decrease risk of falls:  FGA >/= 4pts consistent w/MCID - met  5xSTS </= 12seconds - near met (14sec)  mCTSIB = 120/120 - met  Gait speed during 6MWT >/= .8m/s - met     Patient will improve Lower Extremity Functional Scale score to meet minimal detectable change of improvement to improve performance of ADLs. (9pts) in progress     Patient will be independent with home exercise program for proper self-management of condition. met     Patient will improve pain free active range of motion in deficit areas for ADL completion. met    PLAN:  1 month hiatus from PT to assess ability to maintain gains w/independent HEP.         SUBJECTIVE:  Pt reports she talked to her vascular doctor and they discussed that there isn't much she can do until she needs an ablation.       OBJECTIVE:  Treatments:  Neuromuscular Re-education (88572): 23 minutes   Blaze Pod Toe Taps on blue mat Compliant surface  - 2x2min ea, Best = 24 hits, setup in square at various height to improve dynamic balance w/direction changes, head turns, and reaching to various heights.   Standing Blue Mat, tapping 4 Blaze pods around her -3x1min (Best = 29)  Tandem Walking - to fatigue  Tandem Stance w/UE support prn - 2x1min  Marching w/UE support to " ensure appropriate/safe HEP performance - x20          Therapeutic Activity (52997): 36 minutes  5xSTS:  14sec (improved)  FGA:  23/30 (improved by 8pts)  6MWT: 326.13m, 1070ft, 0 rest breaks (improved)  Gait Speed = .906m/s (improved)  mCTSIB: 120/120sec (mod-max unsteadiness during condition 4) (improved)     -Discussing in depth importance of performing maintenance HEP to maintain gains that were made. Discussed appropriate progressions to make to HEP caden related to walking to cont to improve endurance/strength as tolerated.                 Not Performed this date:   Cybex Leg Press - 3x10, 75lbs, (increased) Feet at Level 3.  Cybex HS Curl - 2x8, 45lbs (increased)  Cybex Leg Extension - 2x8, 40lbs (increased)  Standing Balance - 3x1min, Airex, NBOS, EC  - to improve dynamic balance caden w/head turns and direction changes and challenge foot placement -   ^**x1 LOB requiring ModA and nearby UE support to correct**  Blaze Pods - 3x1min ea, (Best = 26 hits) setup in square performing toe tap to Random pod while naming items from category (Cog dual task) - to improve dynamic balance caden w/head turns and direction changes, and SLS.   STS - 3x10 w/6lb DB - for LE strength/endurance to improve functional mobility.           HEP:  Access Code: [email or web address]  URL: [email or web address]  Date: 01/28/2025  Prepared by: Elijah Marcus    Exercises  - Walking  - 1 x daily - 4-5 x weekly - 1 sets  - Sit to Stand Without Arm Support  - 1 x daily - 3-4 x weekly - 3 sets - 10 reps  - Heel Raises with Counter Support  - 1 x daily - 3-4 x weekly - 3 sets - 15 reps  - Standing Tandem Balance with Counter Support  - 1 x daily - 3-4 x weekly - 1 sets - 3 reps - 1min hold  - Standing March with Counter Support  - 1 x daily - 7 x weekly - 3 sets - 10 reps  ^  Education and discussion on HEP and treatment regarding the benefits related to current condition, POC, pathophysiology, and precautions

## 2025-02-25 NOTE — PROGRESS NOTES
Chief Complaint   Patient presents with    Right Knee - Follow-up, Osteoarthritis     S/p CSI 2/11/258     History of Present Illness:  2/25/25: We gave her a cortisone injection into the right knee on 2/11/25. Her knee is doing well but her hip is significantly bothering her today. She does have pain into her groin.     2/11/25: Antonia Rodriguez is a 70 y.o. female presenting to clinic as a new patient  for her right knee. She reports that her knee has been bothering her for the last two weeks. She denies any discreet injury or trauma. She has pain with specific movements. She has been diagnosed with low back pain and sciatica in the right leg in the past. She did not get much relief with oral steroids in the past.     Imaging:  X-rays right knee: Shows no acute fractures or dislocations. Shows mild medial compartment arthritis grade 1/2.   X-rays right hip: Shows no acute fractures or dislocations. No arthritic change.       Assessment:   Right hip flexor tendonitis    Plan:  We reviewed the role of imaging, physical therapy, injections and the time frame to healing and correlation with outcome.  Medrol Dosepak  NSAID: Ibuprofen consistently for one week then as needed. GI side effects and medical risks discussed.  Norco for acute pain relief. This will be a one-time prescription for pain relief.   Ice/heat: 60 minutes on and off  Exercise home program: Medically directed hip therapy / Handout given.   We will get her in to see one of my non-operative partners for a USG right hip injection in a few weeks. She can follow-up with me 3 weeks after injection.      Physical Exam:  Right Knee:  ROM: Flexion to 120  Pain along medial joint line   Positive Rosario´s test/PositiveApley Grind  Neurovascular exam normal distally  Palpable pedal pulse and good cap refill     Right hip: Pain over the right hip flexor.      Review of Systems:  GENERAL: Negative for malaise, significant weight loss, fever  MUSCULOSKELETAL:  See HPI  NEURO:  Negative     Past Medical History:   Diagnosis Date    Arterial stent thrombosis, initial encounter ([facility])     7 stents - pt has defiberilator    CHF (congestive heart failure) November 16 2021    Coronary artery disease     Decreased range of motion (ROM) of shoulder 12/13/2023    Diabetes (Multi)     Heart disease 11/16/2021    Heart failure     Hypertension 11/16/2021    Left shoulder pain 02/19/2023    Ddx includes adhesive capsulitis. AC joint osteoarthritis rotator cuff tendinitis, biceps tendinitis, and deltoid tendinitis given the various positive physical exam findings. May be related to upper arm usage during cardiac rehab. Limited treatment options given DM and use of blood thinners. Provided AAOS shoulder exercises and stretches. Recommend using of heating pads, following by exercises, f    Myocardial infarction (Multi) 01/03/2024    Comment on above: STEMI    Pacemaker     Personal history of other medical treatment     History of echocardiogram    Sleep apnea     Ulcer of toe of left foot (Multi) 08/02/2022    Ulcer of toe of left foot, with fat layer exposed (Multi) 06/09/2023    Unstable angina pectoris (Multi) 07/07/2022       Medication Documentation Review Audit       Reviewed by Abiola Pretty MA (Medical Assistant) on 02/07/25 at 1200      Medication Order Taking? Sig Documenting Provider Last Dose Status   ascorbic acid (Vitamin C) 500 mg tablet [id number] Yes Take 1 tablet (500 mg) by mouth once daily. Historical Provider, MD  Active   aspirin 81 mg EC tablet [id number] Yes TAKE 1 TABLET BY MOUTH DAILY Juan Boyer MD  Active   atorvastatin (Lipitor) 80 mg tablet [id number] Yes Take 1 tablet (80 mg) by mouth once daily at bedtime. Nakita Denise APRN-CNP  Active   cholecalciferol, vitamin D3, (VITAMIN D3 ORAL) [id number] Yes Take by mouth once daily. Unknown dose Historical Provider, MD  Active   cyanocobalamin (Vitamin B-12) 100 mcg tablet [id number] Yes Take 1 tablet (100  mcg) by mouth once daily. Historical Provider, MD  Active   cyclobenzaprine (Flexeril) 5 mg tablet [id number]  Take 1 tablet (5 mg) by mouth once daily at bedtime for 7 days.   Patient not taking: Reported on 2/7/2025    Ana Gaming MD  Active   dapagliflozin propanediol (Farxiga) 10 mg [id number] Yes Take 1 tablet (10 mg) by mouth once daily. Juan Boyer MD  Active   docusate sodium (Colace) 100 mg capsule [id number] Yes Take 1 capsule (100 mg) by mouth 2 times a day.   Patient taking differently: Take 1 capsule (100 mg) by mouth if needed.    AYE Denise  Active   furosemide (Lasix) 40 mg tablet [id number] Yes Take 1 tablet (40 mg) by mouth once daily as needed (For lower extremity edema). AYE Denise  Active   isosorbide mononitrate ER (Imdur) 60 mg 24 hr tablet [id number] Yes TAKE 1 TABLET BY MOUTH DAILY Juan Boyer MD  Active   methylPREDNISolone (Medrol Dospak) 4 mg tablets [id number] Yes Take as directed on package. Ana Gaming MD  Active   metoprolol succinate XL (Toprol-XL) 25 mg 24 hr tablet [id number] Yes Take 1 tablet (25 mg) by mouth once daily. Juan Boyer MD  Active   nitroglycerin (Nitrostat) 0.4 mg SL tablet [id number] Yes DISSOLVE 1 TABLET UNDER THE TONGUE AS NEEDED FOR CHEST PAIN-  MAY REPEAT EVERY 5 MINUTES IF NEEDED ( MAX 3 DOSES.- IF NO RELIEF CALL 911) AYE Denise  Active   pantoprazole (ProtoNix) 40 mg EC tablet [id number] Yes Take 1 tablet (40 mg) by mouth once daily as needed. Do not crush, chew, or split. Ana Albert MD  Active   sacubitriL-valsartan (Entresto) 24-26 mg tablet [id number] Yes Take 1 tablet by mouth 2 times a day. Juan Boyer MD  Active   sotalol (Betapace) 120 mg tablet [id number] Yes TAKE 1 TABLET BY MOUTH TWICE DAILY Clement Perez MD  Active   tirzepatide (Mounjaro) 12.5 mg/0.5 mL pen injector [id number] Yes Inject 1 pen (12.5 mg) under the skin 1 (one) time per week. Nakita Denise, VENU-KATHYA  Active                     Allergies   Allergen Reactions    Iodinated Contrast Media Hives    Penicillins Hives     Years ago       Social History     Socioeconomic History    Marital status:      Spouse name: Not on file    Number of children: Not on file    Years of education: Not on file    Highest education level: Not on file   Occupational History    Not on file   Tobacco Use    Smoking status: Former     Current packs/day: 0.00     Average packs/day: 1 pack/day for 36.8 years (36.8 ttl pk-yrs)     Types: Cigarettes     Start date: 1/16/1985     Quit date: 11/16/2021     Years since quitting: 3.2    Smokeless tobacco: Never    Tobacco comments:     Stop 11 /16/2021 no urge to restart   Vaping Use    Vaping status: Never Used   Substance and Sexual Activity    Alcohol use: Never     Alcohol/week: 3.0 standard drinks of alcohol     Types: 1 Glasses of wine, 2 Cans of beer per week     Comment: occasional    Drug use: Not Currently     Types: Marijuana    Sexual activity: Not Currently     Partners: Male     Birth control/protection: Abstinence, None   Other Topics Concern    Not on file   Social History Narrative    Not on file     Social Drivers of Health     Financial Resource Strain: High Risk (10/9/2024)    Received from [facility] SDOH Screening     In the past year, have you been unable to get any of the following when you really needed them? choose all that apply.: Internet   Food Insecurity: High Risk (12/5/2024)    Received from [facility] SDOH Screening     In the past 2 months, did you or others you live with eat smaller meals or skip meals because you didn't have money for food?: Yes   Transportation Needs: No Transportation Needs (12/18/2023)    PRAPARE - Transportation     Lack of Transportation (Medical): No     Lack of Transportation (Non-Medical): No   Physical Activity: Sufficiently Active (12/18/2023)    Exercise Vital Sign     Days of Exercise per Week: 5 days     Minutes  of Exercise per Session: 90 min   Stress: No Stress Concern Present (2023)    [employer] [place] [employer] - Occupational Stress Questionnaire     Feeling of Stress : Not at all   Social Connections: Moderately Isolated (2023)    Social Connection and Isolation Panel [NHANES]     Frequency of Communication with Friends and Family: More than three times a week     Frequency of Social Gatherings with Friends and Family: Three times a week     Attends Restoration Services: More than 4 times per year     Active Member of Clubs or Organizations: No     Attends Club or Organization Meetings: Never     Marital Status:    Intimate Partner Violence: Not At Risk (2023)    Humiliation, Afraid, Rape, and Kick questionnaire     Fear of Current or Ex-Partner: No     Emotionally Abused: No     Physically Abused: No     Sexually Abused: No   Housing Stability: Low Risk  (2023)    Housing Stability Vital Sign     Unable to Pay for Housing in the Last Year: No     Number of Places Lived in the Last Year: 1     Unstable Housing in the Last Year: No       Past Surgical History:   Procedure Laterality Date    CARDIAC CATHETERIZATION N/A 2023    Procedure: Left Heart Cath;  Surgeon: Juan Boyer MD;  Location: [facility] Cardiac Cath Lab;  Service: Cardiovascular;  Laterality: N/A;    CARDIAC CATHETERIZATION N/A 2023    Procedure: IFR (Instant Wave Free Ration);  Surgeon: Juan Boyer MD;  Location: [facility] Cardiac Cath Lab;  Service: Cardiovascular;  Laterality: N/A;     SECTION, LOW TRANSVERSE  1976  and 1979    CORONARY ANGIOPLASTY      CORONARY STENT PLACEMENT      INSERT / REPLACE / REMOVE PACEMAKER      OTHER SURGICAL HISTORY  2022    Cardiac catheterization with stent placement    OTHER SURGICAL HISTORY  2022    Cardioverter defibrillator insertion       XR lumbar spine complete 4+ views    Result Date: 2025  Interpreted By:  Emil Greco,  STUDY: XR LUMBAR SPINE COMPLETE 4+ VIEWS   INDICATION: Signs/Symptoms:right back pain.   COMPARISON: Reconstruction images from a CT of the chest and abdomen performed June 7, 2024   ACCESSION NUMBER(S): [id number]   ORDERING CLINICIAN: MARILY CORTEZ   FINDINGS: Previous vertebral compression fracture at L1 similar to prior.   Moderate multilevel lumbar degenerative changes are grossly similar to the prior exam.   There is no evidence of new fracture.       Moderate lumbar degenerative changes similar to the prior study.   Remote L1 vertebral compression fracture.   Signed by: Emil Greco 2/4/2025 5:59 PM Dictation workstation:   [id number]    XR hip right with pelvis when performed 4+ views    Result Date: 2/4/2025  Interpreted By:  Emil Greco, STUDY: XR HIP RIGHT WITH PELVIS WHEN PERFORMED 4+ VIEWS   INDICATION: Signs/Symptoms:right hip pain.   COMPARISON: None   ACCESSION NUMBER(S): [id number]   ORDERING CLINICIAN: MARILY CORTEZ   FINDINGS: Mild osteoarthritis bilateral hips. Trochanteric spurs. No evidence of fracture. Bilateral sacroiliac osteoarthritis.       Mild degenerative changes. No acute findings.   Signed by: Emil Greco 2/4/2025 5:59 PM Dictation workstation:   [id number]         Scribe Attestation  By signing my name below, Leny ROBBINS Scribe   attest that this documentation has been prepared under the direction and in the presence of Douglas Moran MD.

## 2025-02-25 NOTE — TELEPHONE ENCOUNTER
Patient called stating she is having too much pain to walk and cannot make it to her appt. She might try to come through the walk in clinic today.

## 2025-02-26 ENCOUNTER — TELEPHONE (OUTPATIENT)
Dept: ORTHOPEDIC SURGERY | Facility: CLINIC | Age: 70
End: 2025-02-26
Payer: COMMERCIAL

## 2025-02-26 DIAGNOSIS — I10 ESSENTIAL (PRIMARY) HYPERTENSION: ICD-10-CM

## 2025-02-26 NOTE — TELEPHONE ENCOUNTER
Pt called stating she was told meds would be sent by end of day, however, the meds have not been sent to the pharmacy as of this morning. Advised the provided will be notified.

## 2025-02-27 RX ORDER — IBUPROFEN 800 MG/1
800 TABLET ORAL EVERY 8 HOURS PRN
Qty: 90 TABLET | Refills: 2 | Status: SHIPPED | OUTPATIENT
Start: 2025-02-27

## 2025-02-27 RX ORDER — METHYLPREDNISOLONE 4 MG/1
TABLET ORAL
Qty: 21 TABLET | Refills: 0 | Status: SHIPPED | OUTPATIENT
Start: 2025-02-27

## 2025-02-27 RX ORDER — HYDROCODONE BITARTRATE AND ACETAMINOPHEN 5; 325 MG/1; MG/1
1 TABLET ORAL EVERY 6 HOURS PRN
Qty: 20 TABLET | Refills: 0 | Status: SHIPPED | OUTPATIENT
Start: 2025-02-27 | End: 2025-03-06

## 2025-02-28 RX ORDER — SOTALOL HYDROCHLORIDE 120 MG/1
120 TABLET ORAL 2 TIMES DAILY
Qty: 180 TABLET | Refills: 2 | Status: SHIPPED | OUTPATIENT
Start: 2025-02-28

## 2025-03-11 ENCOUNTER — APPOINTMENT (OUTPATIENT)
Dept: ORTHOPEDIC SURGERY | Facility: CLINIC | Age: 70
End: 2025-03-11
Payer: COMMERCIAL

## 2025-03-19 ENCOUNTER — HOSPITAL ENCOUNTER (OUTPATIENT)
Dept: RADIOLOGY | Facility: EXTERNAL LOCATION | Age: 70
Discharge: HOME | End: 2025-03-19

## 2025-03-19 ENCOUNTER — OFFICE VISIT (OUTPATIENT)
Dept: ORTHOPEDIC SURGERY | Facility: CLINIC | Age: 70
End: 2025-03-19
Payer: COMMERCIAL

## 2025-03-19 DIAGNOSIS — M76.891 HIP FLEXOR TENDONITIS, RIGHT: ICD-10-CM

## 2025-03-19 PROCEDURE — 20550 NJX 1 TENDON SHEATH/LIGAMENT: CPT | Performed by: FAMILY MEDICINE

## 2025-03-19 PROCEDURE — 3044F HG A1C LEVEL LT 7.0%: CPT | Performed by: FAMILY MEDICINE

## 2025-03-19 PROCEDURE — 76942 ECHO GUIDE FOR BIOPSY: CPT | Performed by: FAMILY MEDICINE

## 2025-03-19 PROCEDURE — 1123F ACP DISCUSS/DSCN MKR DOCD: CPT | Performed by: FAMILY MEDICINE

## 2025-03-19 PROCEDURE — 2500000004 HC RX 250 GENERAL PHARMACY W/ HCPCS (ALT 636 FOR OP/ED): Performed by: FAMILY MEDICINE

## 2025-03-19 PROCEDURE — 99214 OFFICE O/P EST MOD 30 MIN: CPT | Performed by: FAMILY MEDICINE

## 2025-03-19 PROCEDURE — 99213 OFFICE O/P EST LOW 20 MIN: CPT | Performed by: FAMILY MEDICINE

## 2025-03-19 RX ORDER — BETAMETHASONE SODIUM PHOSPHATE AND BETAMETHASONE ACETATE 3; 3 MG/ML; MG/ML
9 INJECTION, SUSPENSION INTRA-ARTICULAR; INTRALESIONAL; INTRAMUSCULAR; SOFT TISSUE
Status: COMPLETED | OUTPATIENT
Start: 2025-03-19 | End: 2025-03-19

## 2025-03-19 RX ORDER — LIDOCAINE HYDROCHLORIDE 10 MG/ML
6 INJECTION, SOLUTION INFILTRATION; PERINEURAL
Status: COMPLETED | OUTPATIENT
Start: 2025-03-19 | End: 2025-03-19

## 2025-03-19 RX ADMIN — LIDOCAINE HYDROCHLORIDE 6 ML: 10 INJECTION, SOLUTION INFILTRATION; PERINEURAL at 08:21

## 2025-03-19 RX ADMIN — BETAMETHASONE SODIUM PHOSPHATE AND BETAMETHASONE ACETATE 9 MG: 3; 3 INJECTION, SUSPENSION INTRA-ARTICULAR; INTRALESIONAL; INTRAMUSCULAR at 08:21

## 2025-03-19 NOTE — PROGRESS NOTES
"  Established Patient Follow-Up Visit    CC:   Chief Complaint   Patient presents with    Right Hip - Injections     DM patient  Flexor tendonitis       HPI:  Antonia \"Maryjo\" is a 70 y.o. female returns here today for follow-up visit regarding: Referred here today by Dr. Douglas Moran for right anterior hip flexor tendon sheath injection.  She denies any numbness tingling or burning has superficial pain directly over her right hip flexor.  She is here today for the injection.          REVIEW OF SYSTEMS:  GENERAL: Negative for malaise, significant weight loss, fever  MUSCULOSKELETAL: See HPI  NEURO: Negative for numbness / tingling       PHYSICAL EXAM:  -Neuro: Gross sensation intact to the lower extremities bilaterally.  -Extremity: Right hip demonstrates a negative logroll moderate tenderness palpation directly over the soft tissues over the anterior hip flexor.  Negative Jarek's test.  No open cuts wounds or sores over the target area injection    IMAGING: Previous imaging reviewed no new images today  Point of Care Ultrasound  These images are not reportable by radiology and will not be interpreted   by  Radiologists.      PROCEDURE: Right anterior hip flexor tendon sheath injection  Tendon Sheath Injection (Right anterior hip flexor tendon sheath) on 3/19/2025 8:21 AM  Indications: pain, tendon swelling and therapeutic benefit  Details: 22 G needle, ultrasound-guided lateral approach  Medications: 6 mL lidocaine 10 mg/mL (1 %); 9 mg betamethasone acet,sod phos 6 mg/mL  Outcome: tolerated well, no immediate complications  Procedure, treatment alternatives, risks and benefits explained, specific risks discussed. Consent was given by the patient. Immediately prior to procedure a time out was called to verify the correct patient, procedure, equipment, support staff and site/side marked as required. Patient was prepped and draped in the usual sterile fashion.            ASSESSMENT:   Follow-up visit for:  Problem " List Items Addressed This Visit    None  Visit Diagnoses       Hip flexor tendonitis, right        Relevant Orders    Point of Care Ultrasound (Completed)             PLAN: Patient received and tolerated today's injection very well.  On ultrasound there was a little bit of just greater than normal-appearing physiologic fluid about the right femoral acetabular joint.  Should this injection not provide her significant or meaningful relief or if it only takes care of a portion of the pain completely we will offer her a intra-articular hip injection down the road if necessary as per Dr. Moran and any further recommendations.  Will have her follow-up with Dr. Douglas Moran in a few weeks after the injection.  Recommend she hold off from her water aerobics for today to limit hot tub soaks or baths x 2 days before getting into the water and submerging the hip.  Routine postprocedure precautions were provided.  Postprocedure patient noted significant 100% immediate pain relief.  Orders Placed This Encounter    Tendon Sheath Injection    Point of Care Ultrasound           At the conclusion of the visit there were no further questions by the patient/family regarding their plan of care.  Patient was instructed to call or return with any issues, questions, or concerns regarding their injury and/or treatment plan described above.     03/19/25 at 8:34 AM - Cole C Budinsky, MD    Office: [phone]    This note was prepared using voice recognition software.  The details of this note are correct and have been reviewed, and corrected to the best of my ability.  Some grammatical errors may persist related to the Dragon software.

## 2025-04-15 ENCOUNTER — APPOINTMENT (OUTPATIENT)
Dept: PRIMARY CARE | Facility: CLINIC | Age: 70
End: 2025-04-15
Payer: COMMERCIAL

## 2025-04-15 VITALS
WEIGHT: 152 LBS | BODY MASS INDEX: 24.43 KG/M2 | SYSTOLIC BLOOD PRESSURE: 96 MMHG | OXYGEN SATURATION: 96 % | TEMPERATURE: 97.3 F | DIASTOLIC BLOOD PRESSURE: 64 MMHG | HEART RATE: 76 BPM | HEIGHT: 66 IN

## 2025-04-15 DIAGNOSIS — E11.42 DIABETIC POLYNEUROPATHY ASSOCIATED WITH TYPE 2 DIABETES MELLITUS: ICD-10-CM

## 2025-04-15 DIAGNOSIS — M25.562 ACUTE PAIN OF LEFT KNEE: ICD-10-CM

## 2025-04-15 DIAGNOSIS — E11.9 CONTROLLED TYPE 2 DIABETES MELLITUS WITHOUT COMPLICATION, WITHOUT LONG-TERM CURRENT USE OF INSULIN: ICD-10-CM

## 2025-04-15 DIAGNOSIS — G47.33 OSA (OBSTRUCTIVE SLEEP APNEA): ICD-10-CM

## 2025-04-15 DIAGNOSIS — R30.0 BURNING WITH URINATION: ICD-10-CM

## 2025-04-15 DIAGNOSIS — N89.8 VAGINAL ITCHING: ICD-10-CM

## 2025-04-15 DIAGNOSIS — Z00.00 ROUTINE GENERAL MEDICAL EXAMINATION AT HEALTH CARE FACILITY: Primary | ICD-10-CM

## 2025-04-15 DIAGNOSIS — R63.4 UNINTENTIONAL WEIGHT LOSS: ICD-10-CM

## 2025-04-15 DIAGNOSIS — J44.9 CHRONIC OBSTRUCTIVE PULMONARY DISEASE, UNSPECIFIED COPD TYPE (MULTI): ICD-10-CM

## 2025-04-15 DIAGNOSIS — Z71.89 GOALS OF CARE, COUNSELING/DISCUSSION: ICD-10-CM

## 2025-04-15 DIAGNOSIS — E11.22 CONTROLLED TYPE 2 DIABETES MELLITUS WITH STAGE 3 CHRONIC KIDNEY DISEASE, WITHOUT LONG-TERM CURRENT USE OF INSULIN (MULTI): ICD-10-CM

## 2025-04-15 DIAGNOSIS — E27.9 ADRENAL NODULE: ICD-10-CM

## 2025-04-15 DIAGNOSIS — Z13.1 DIABETES MELLITUS SCREENING: ICD-10-CM

## 2025-04-15 DIAGNOSIS — Z12.31 ENCOUNTER FOR SCREENING MAMMOGRAM FOR BREAST CANCER: ICD-10-CM

## 2025-04-15 DIAGNOSIS — N18.30 CONTROLLED TYPE 2 DIABETES MELLITUS WITH STAGE 3 CHRONIC KIDNEY DISEASE, WITHOUT LONG-TERM CURRENT USE OF INSULIN (MULTI): ICD-10-CM

## 2025-04-15 DIAGNOSIS — N18.2 CKD STAGE 2 DUE TO TYPE 2 DIABETES MELLITUS (MULTI): ICD-10-CM

## 2025-04-15 DIAGNOSIS — I25.118 CORONARY ARTERY DISEASE OF NATIVE ARTERY OF NATIVE HEART WITH STABLE ANGINA PECTORIS: ICD-10-CM

## 2025-04-15 DIAGNOSIS — E55.9 VITAMIN D INSUFFICIENCY: ICD-10-CM

## 2025-04-15 DIAGNOSIS — E11.22 CKD STAGE 2 DUE TO TYPE 2 DIABETES MELLITUS (MULTI): ICD-10-CM

## 2025-04-15 DIAGNOSIS — E78.5 DYSLIPIDEMIA: ICD-10-CM

## 2025-04-15 DIAGNOSIS — I47.0 RE-ENTRY VENTRICULAR ARRHYTHMIA (MULTI): ICD-10-CM

## 2025-04-15 DIAGNOSIS — N89.8 PRURITUS OF VAGINA: ICD-10-CM

## 2025-04-15 LAB
POC APPEARANCE, URINE: ABNORMAL
POC BILIRUBIN, URINE: NEGATIVE
POC BLOOD, URINE: NEGATIVE
POC COLOR, URINE: ABNORMAL
POC GLUCOSE, URINE: ABNORMAL MG/DL
POC HEMOGLOBIN A1C: 6.3 % (ref 4.2–6.5)
POC KETONES, URINE: NEGATIVE MG/DL
POC LEUKOCYTES, URINE: NEGATIVE
POC NITRITE,URINE: NEGATIVE
POC PH, URINE: 5.5 PH
POC PROTEIN, URINE: NEGATIVE MG/DL
POC SPECIFIC GRAVITY, URINE: 1.01
POC UROBILINOGEN, URINE: 0.2 EU/DL

## 2025-04-15 PROCEDURE — G0439 PPPS, SUBSEQ VISIT: HCPCS | Performed by: NURSE PRACTITIONER

## 2025-04-15 PROCEDURE — 81002 URINALYSIS NONAUTO W/O SCOPE: CPT | Performed by: NURSE PRACTITIONER

## 2025-04-15 PROCEDURE — 83036 HEMOGLOBIN GLYCOSYLATED A1C: CPT | Performed by: NURSE PRACTITIONER

## 2025-04-15 ASSESSMENT — ENCOUNTER SYMPTOMS
PALPITATIONS: 0
CHILLS: 0
ARTHRALGIAS: 1
COUGH: 0
BACK PAIN: 0
JOINT SWELLING: 0
FATIGUE: 1
SHORTNESS OF BREATH: 0
CHEST TIGHTNESS: 0
FEVER: 0

## 2025-04-15 ASSESSMENT — ACTIVITIES OF DAILY LIVING (ADL)
GROCERY_SHOPPING: INDEPENDENT
TAKING_MEDICATION: INDEPENDENT
DOING_HOUSEWORK: INDEPENDENT
DRESSING: INDEPENDENT
MANAGING_FINANCES: INDEPENDENT
BATHING: INDEPENDENT

## 2025-04-15 NOTE — PROGRESS NOTES
"Subjective   Reason for Visit: Antonia Rodriguez is an 70 y.o. female here for a Medicare Wellness visit.   Past Medical, Surgical, and Family History reviewed and updated in chart.    Reviewed all medications by prescribing practitioner or clinical pharmacist (such as prescriptions, OTCs, herbal therapies and supplements) and documented in the medical record.  HPI    Patient Care Team:  VENU Denise-CNP as PCP - General (Gerontology)     Review of Systems   Constitutional:  Positive for fatigue. Negative for chills and fever.   Respiratory:  Negative for cough, chest tightness and shortness of breath.    Cardiovascular:  Positive for chest pain (occasional, infrequent. Responds to 1 nitro). Negative for palpitations and leg swelling.   Musculoskeletal:  Positive for arthralgias (right knee pain). Negative for back pain and joint swelling.     Objective   Vitals:  BP 96/64   Pulse 76   Temp 36.3 °C (97.3 °F)   Ht 1.676 m (5' 6\")   Wt 68.9 kg (152 lb)   LMP  (LMP Unknown)   SpO2 96%   BMI 24.53 kg/m²       Physical Exam  Constitutional:       Appearance: Normal appearance.   Cardiovascular:      Rate and Rhythm: Normal rate and regular rhythm.      Heart sounds: No murmur heard.  Pulmonary:      Effort: Pulmonary effort is normal.      Breath sounds: Normal breath sounds.   Skin:     General: Skin is warm and dry.   Neurological:      Mental Status: She is alert and oriented to person, place, and time.       Assessment & Plan  Controlled type 2 diabetes mellitus with stage 3 chronic kidney disease, without long-term current use of insulin (Multi)  POCT A1C 6.3% and patient's weight has dropped lower than desired. Will decrease her dose of Mounjaro from 12.5 mg to 10 mg weekly. She will continue to follow-up with PharmD for additional adjustmants as needed.   Orders:    Follow Up In [facility] - PCP - Medicare Annual    POCT glycosylated hemoglobin (Hb A1C) manually resulted    tirzepatide, " weight loss, (Zepbound) 10 mg/0.5 mL injection; Inject 10 mg under the skin every 7 days.    CBC; Future    Lipid panel; Future    Burning with urination  States she also has burning and itching in zulema area when not voiding. POCT UA negative.  Suspect the burning is related to dermal candidiasis.  Orders:    POCT UA (nonautomated) manually resulted    Vaginal itching  Symptoms suspicious for candidiasis. Will ,check BV/yeast swab and treat once results are back.   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 She is doing PT        Diabetic polyneuropathy associated with type 2 diabetes mellitus         Chronic obstructive pulmonary disease, unspecified COPD type (Multi)         JOSE ARMANDO (obstructive sleep apnea)  States prior sleep study showed this was mild and that she does not require CPAP.       Controlled type 2 diabetes mellitus without complication, without long-term current use of insulin         Unintentional weight loss    Orders:    tirzepatide, weight loss, (Zepbound) 10 mg/0.5 mL injection; Inject 10 mg under the skin every 7 days.    Routine general medical examination at health care facility    Orders:    1 Year Follow Up In [facility] - PCP - Wellness Exam; Future    Comprehensive Metabolic Panel;  Future    CBC; Future    Diabetes mellitus screening    Orders:    Urine Microalbumin - Lab collect; Future    Encounter for screening mammogram for breast cancer    Orders:    BI mammo bilateral screening tomosynthesis; Future

## 2025-04-15 NOTE — ASSESSMENT & PLAN NOTE
POCT A1C 6.3% and patient's weight has dropped lower than desired. Will decrease her dose of Mounjaro from 12.5 mg to 10 mg weekly. She will continue to follow-up with PharmD for additional adjustmants as needed.   Orders:    Follow Up In [facility] - PCP - Medicare Annual    POCT glycosylated hemoglobin (Hb A1C) manually resulted    tirzepatide, weight loss, (Zepbound) 10 mg/0.5 mL injection; Inject 10 mg under the skin every 7 days.    CBC; Future    Lipid panel; Future

## 2025-04-16 ENCOUNTER — APPOINTMENT (OUTPATIENT)
Dept: PHARMACY | Facility: HOSPITAL | Age: 70
End: 2025-04-16
Payer: COMMERCIAL

## 2025-04-16 DIAGNOSIS — E11.9 CONTROLLED TYPE 2 DIABETES MELLITUS WITHOUT COMPLICATION, WITHOUT LONG-TERM CURRENT USE OF INSULIN: ICD-10-CM

## 2025-04-16 LAB
25(OH)D3+25(OH)D2 SERPL-MCNC: 37 NG/ML (ref 30–100)
BV SCORE VAG QL: NORMAL
CHOLEST SERPL-MCNC: 120 MG/DL
CHOLEST/HDLC SERPL: 2.6 (CALC)
ERYTHROCYTE [DISTWIDTH] IN BLOOD BY AUTOMATED COUNT: 14.2 % (ref 11–15)
HCT VFR BLD AUTO: 44.1 % (ref 35–45)
HDLC SERPL-MCNC: 46 MG/DL
HGB BLD-MCNC: 14.9 G/DL (ref 11.7–15.5)
LDLC SERPL CALC-MCNC: 56 MG/DL (CALC)
MCH RBC QN AUTO: 30.4 PG (ref 27–33)
MCHC RBC AUTO-ENTMCNC: 33.8 G/DL (ref 32–36)
MCV RBC AUTO: 90 FL (ref 80–100)
NONHDLC SERPL-MCNC: 74 MG/DL (CALC)
PLATELET # BLD AUTO: 310 THOUSAND/UL (ref 140–400)
PMV BLD REES-ECKER: 10.2 FL (ref 7.5–12.5)
RBC # BLD AUTO: 4.9 MILLION/UL (ref 3.8–5.1)
TRIGL SERPL-MCNC: 95 MG/DL
WBC # BLD AUTO: 6.2 THOUSAND/UL (ref 3.8–10.8)

## 2025-04-17 LAB
ALBUMIN SERPL-MCNC: 4.2 G/DL (ref 3.6–5.1)
ALBUMIN/CREAT UR: 16 MG/G CREAT
ALP SERPL-CCNC: 67 U/L (ref 37–153)
ALT SERPL-CCNC: 15 U/L (ref 6–29)
ANION GAP SERPL CALCULATED.4IONS-SCNC: 8 MMOL/L (CALC) (ref 7–17)
AST SERPL-CCNC: 16 U/L (ref 10–35)
BILIRUB SERPL-MCNC: 1 MG/DL (ref 0.2–1.2)
BUN SERPL-MCNC: 19 MG/DL (ref 7–25)
CALCIUM SERPL-MCNC: 9.5 MG/DL (ref 8.6–10.4)
CHLORIDE SERPL-SCNC: 105 MMOL/L (ref 98–110)
CO2 SERPL-SCNC: 31 MMOL/L (ref 20–32)
CREAT SERPL-MCNC: 0.75 MG/DL (ref 0.6–1)
CREAT UR-MCNC: 116 MG/DL (ref 20–275)
EGFRCR SERPLBLD CKD-EPI 2021: 86 ML/MIN/1.73M2
GLUCOSE SERPL-MCNC: 130 MG/DL (ref 65–99)
MICROALBUMIN UR-MCNC: 1.9 MG/DL
POTASSIUM SERPL-SCNC: 3.5 MMOL/L (ref 3.5–5.3)
PROT SERPL-MCNC: 6.3 G/DL (ref 6.1–8.1)
SODIUM SERPL-SCNC: 144 MMOL/L (ref 135–146)

## 2025-04-18 RX ORDER — TIRZEPATIDE 10 MG/.5ML
10 INJECTION, SOLUTION SUBCUTANEOUS WEEKLY
Qty: 2 ML | Refills: 0 | Status: SHIPPED | OUTPATIENT
Start: 2025-04-18

## 2025-04-18 NOTE — PROGRESS NOTES
"Pharmacy Post-Discharge Visit  Antonia Rodriguez \"Maryjo\" is a 70 y.o. female was referred to Clinical Pharmacy Team to complete a post-discharge medication optimization and monitoring visit.  The patient was referred for their diabetes, COPD and HF.    Admission Date: 2023  Discharge Date: 2023    Referring Provider: AYE Denise    Subjective   Allergies   Allergen Reactions    Iodinated Contrast Media Hives    Penicillins Hives     Years ago       [id number] - [place], [place] - [address]  [address] [facility] [address]  Phone: [phone] Fax: [phone]    [facility]  [address]  [facility] [address]  Phone: [phone] Fax: [phone]    Social History     Social History Narrative    Not on file     Notable Medication changes following discharge:  -none    Diabetes  She presents for her follow-up diabetic visit. She has type 2 diabetes mellitus. There are no hypoglycemic associated symptoms. There are no hypoglycemic complications. Current diabetic treatment includes oral agent (monotherapy) (and weekly GLP-1).     Current DM Pharmacotherapy:   -Mounjaro 12.5 mg weekly  -Farxiga 10mg daily    SECONDARY PREVENTION  - Statin? Yes  - ACE-I/ARB? No  - Aspirin? Yes    Current monitoring regimen:   Patient is using: glucometer    B to 120    Any episodes of hypoglycemia? No  Hypoglycemia awareness? No    Pertinent PMH Review:  - PMH of Pancreatitis: No  - PMH/FH of Medullary Thyroid Cancer: No  - PMH of Retinopathy: No  - PMH of Urinary Tract Infections: No    CHF Assessment    Symptom/Staging:  -Most recent ejection fraction: 55-60%  -Weight changes?: No    Guideline-Directed Medical Therapy:  -ARNI: Yes, describe: Entresto 24-26  -Beta Blocker: Yes, describe: sotalol  -MRA: Yes, describe: spironolactone 25mg   -SGLT2i: Yes, describe: farxiga 10mg daily    Secondary Prevention:  -The ASCVD Risk score (Bibiana DK, et " al., 2019) failed to calculate for the following reasons:    Risk score cannot be calculated because patient has a medical history suggesting prior/existing ASCVD   -Aspirin 81mg? yes  -Statin?: Yes, describe: atorvastatin 80mg nightly  -HTN?: Yes, describe: Entresto 24-26 bid    BP: none to report    Objective     LMP  (LMP Unknown)      LAB  Lab Results   Component Value Date    BILITOT 1.0 04/16/2025    CALCIUM 9.5 04/16/2025    CO2 31 04/16/2025     04/16/2025    CREATININE 0.75 04/16/2025    GLUCOSE 130 (H) 04/16/2025    ALKPHOS 67 04/16/2025    K 3.5 04/16/2025    PROT 6.3 04/16/2025     04/16/2025    AST 16 04/16/2025    ALT 15 04/16/2025    BUN 19 04/16/2025    ANIONGAP 8 04/16/2025    MG 2.32 12/16/2024    PHOS 4.0 07/09/2022    ALBUMIN 4.2 04/16/2025    EGFR 86 04/16/2025     Lab Results   Component Value Date    TRIG 95 04/16/2025    CHOL 120 04/16/2025    LDLCALC 56 04/16/2025    HDL 46 (L) 04/16/2025     Lab Results   Component Value Date    HGBA1C 6.3 04/15/2025     Current Outpatient Medications on File Prior to Visit   Medication Sig Dispense Refill    ascorbic acid (Vitamin C) 500 mg tablet Take 1 tablet (500 mg) by mouth once daily.      aspirin 81 mg EC tablet TAKE 1 TABLET BY MOUTH DAILY 90 tablet 3    atorvastatin (Lipitor) 80 mg tablet Take 1 tablet (80 mg) by mouth once daily at bedtime. 90 tablet 3    cholecalciferol, vitamin D3, (VITAMIN D3 ORAL) Take by mouth once daily. Unknown dose      cyanocobalamin (Vitamin B-12) 100 mcg tablet Take 1 tablet (100 mcg) by mouth once daily.      dapagliflozin propanediol (Farxiga) 10 mg Take 1 tablet (10 mg) by mouth once daily. 90 tablet 3    docusate sodium (Colace) 100 mg capsule Take 1 capsule (100 mg) by mouth 2 times a day. (Patient taking differently: Take 1 capsule (100 mg) by mouth if needed.) 60 capsule 5    furosemide (Lasix) 40 mg tablet Take 1 tablet (40 mg) by mouth once daily as needed (For lower extremity edema). 20 tablet 1     ibuprofen 800 mg tablet Take 1 tablet (800 mg) by mouth every 8 hours if needed for mild pain (1 - 3). 90 tablet 2    isosorbide mononitrate ER (Imdur) 60 mg 24 hr tablet TAKE 1 TABLET BY MOUTH DAILY 90 tablet 3    methylPREDNISolone (Medrol Dospak) 4 mg tablets Use as directed by package instructions 21 tablet 0    metoprolol succinate XL (Toprol-XL) 25 mg 24 hr tablet Take 1 tablet (25 mg) by mouth once daily. 30 tablet 11    nitroglycerin (Nitrostat) 0.4 mg SL tablet DISSOLVE 1 TABLET UNDER THE TONGUE AS NEEDED FOR CHEST PAIN-  MAY REPEAT EVERY 5 MINUTES IF NEEDED ( MAX 3 DOSES.- IF NO RELIEF CALL 911) 25 tablet 3    pantoprazole (ProtoNix) 40 mg EC tablet Take 1 tablet (40 mg) by mouth once daily as needed. Do not crush, chew, or split.      sacubitriL-valsartan (Entresto) 24-26 mg tablet Take 1 tablet by mouth 2 times a day.      sotalol (Betapace) 120 mg tablet TAKE 1 TABLET BY MOUTH TWICE DAILY 180 tablet 2    tirzepatide, weight loss, (Zepbound) 10 mg/0.5 mL injection Inject 10 mg under the skin every 7 days. 4 each 0    [DISCONTINUED] ibuprofen 800 mg tablet Take 1 tablet (800 mg) by mouth every 8 hours if needed for mild pain (1 - 3). 90 tablet 2    [DISCONTINUED] tirzepatide (Mounjaro) 12.5 mg/0.5 mL pen injector Inject 1 pen (12.5 mg) under the skin 1 (one) time per week. 6 mL 1     No current facility-administered medications on file prior to visit.     Assessment/Plan   Patient lost too much weight while on Mounjaro 12.5 mg weekly. Will decrease Mounjaro to 10 mg weekly to prevent further weight loss and will follow up in 4 weeks to review weight loss.    Plan:  DECREASE Mounjaro to 10 mg weekly  Follow up in 4 weeks to review BG    Danae Ybarra PharmD, [facility]    Verbal consent to manage patient's drug therapy was obtained from the patient. They were informed they may decline to participate or withdraw from participation in pharmacy services at any time.

## 2025-06-12 ENCOUNTER — APPOINTMENT (OUTPATIENT)
Dept: CARDIOLOGY | Facility: CLINIC | Age: 70
End: 2025-06-12
Payer: COMMERCIAL

## 2025-06-12 VITALS
HEIGHT: 66 IN | SYSTOLIC BLOOD PRESSURE: 118 MMHG | DIASTOLIC BLOOD PRESSURE: 64 MMHG | HEART RATE: 86 BPM | BODY MASS INDEX: 24.43 KG/M2 | WEIGHT: 152 LBS | OXYGEN SATURATION: 98 %

## 2025-06-12 DIAGNOSIS — I44.7 LBBB (LEFT BUNDLE BRANCH BLOCK): ICD-10-CM

## 2025-06-12 DIAGNOSIS — I47.20 PAROXYSMAL VENTRICULAR TACHYCARDIA: ICD-10-CM

## 2025-06-12 DIAGNOSIS — Z95.810 PRESENCE OF AUTOMATIC CARDIOVERTER/DEFIBRILLATOR (AICD): ICD-10-CM

## 2025-06-12 DIAGNOSIS — I10 PRIMARY HYPERTENSION: ICD-10-CM

## 2025-06-12 DIAGNOSIS — I47.0 RE-ENTRY VENTRICULAR ARRHYTHMIA (MULTI): Primary | ICD-10-CM

## 2025-06-12 DIAGNOSIS — I25.118 CORONARY ARTERY DISEASE OF NATIVE ARTERY OF NATIVE HEART WITH STABLE ANGINA PECTORIS: ICD-10-CM

## 2025-06-12 NOTE — PROGRESS NOTES
"    Cardiac Electrophysiology Office Visit     Referred by Dr. Chatterjee ref. provider found for   No chief complaint on file.    HPI:  Antonia Rodriguez is a 70 y.o. year old female patient with h/o  h/o ICM s/p CRTD, HTN, DM, HLD, CAD s/p PCI (Nov 2021) presenting today for follow up    Pt reports doing well, denies any episode of chest pain, shortness of breath palpitations or dizziness.    Objective  Allergies   Allergen Reactions    Iodinated Contrast Media Hives    Penicillins Hives     Years ago        Current Outpatient Medications   Medication Instructions    ascorbic acid (VITAMIN C) 500 mg, Daily    aspirin 81 mg, oral, Daily    atorvastatin (LIPITOR) 80 mg, oral, Nightly    cholecalciferol, vitamin D3, (VITAMIN D3 ORAL) Daily    cyanocobalamin (VITAMIN B-12) 100 mcg, Daily    docusate sodium (COLACE) 100 mg, oral, 2 times daily    Farxiga 10 mg, oral, Daily    furosemide (LASIX) 40 mg, oral, Daily PRN    ibuprofen 800 mg, oral, Every 8 hours PRN    isosorbide mononitrate ER (IMDUR) 60 mg, oral, Daily    methylPREDNISolone (Medrol Dospak) 4 mg tablets Use as directed by package instructions    metoprolol succinate XL (TOPROL-XL) 25 mg, oral, Daily    nitroglycerin (Nitrostat) 0.4 mg SL tablet DISSOLVE 1 TABLET UNDER THE TONGUE AS NEEDED FOR CHEST PAIN-  MAY REPEAT EVERY 5 MINUTES IF NEEDED ( MAX 3 DOSES.- IF NO RELIEF CALL 911)    pantoprazole (PROTONIX) 40 mg, Daily PRN    sacubitriL-valsartan (Entresto) 24-26 mg tablet 1 tablet, 2 times daily    sotalol (BETAPACE) 120 mg, oral, 2 times daily    tirzepatide (Mounjaro) 10 mg/0.5 mL pen injector Inject 1 pen (10 mg) under the skin 1 (one) time per week.         Visit Vitals  /64 (BP Location: Right arm, Patient Position: Sitting)   Pulse 86   Ht 1.676 m (5' 6\")   Wt 68.9 kg (152 lb)   LMP  (LMP Unknown)   SpO2 98%   BMI 24.53 kg/m²   OB Status Postmenopausal   Smoking Status Former   BSA 1.79 m²      Physical Exam  Constitutional:       Appearance: Normal " appearance.   HENT:      Head: Normocephalic.   Eyes:      Pupils: Pupils are equal, round, and reactive to light.   Cardiovascular:      Rate and Rhythm: Normal rate and regular rhythm.      Pulses: Normal pulses.      Comments: left sided implant healed well, no ecchymosis, hematoma or drainage noted  Pulmonary:      Effort: Pulmonary effort is normal. No respiratory distress.      Breath sounds: Normal breath sounds.   Skin:     General: Skin is warm and dry.      Capillary Refill: Capillary refill takes less than 2 seconds.   Neurological:      Mental Status: She is alert.         My Interpretation of Reviewed Study(s):  Results  DIAGNOSTIC  Cardiac catheterization: Moderate RCA stenosis, normal stents in RCA and circumflex (12/2023)  Stress test: Small scar, no new ischemia (04/2024)  EKG: Change in pacing morphology (06/12/2025)    Echo (March 2023): Normal left ventricular systolic function with an EF of 55 to 60%. Normal biatrial sizes. No significant valvular dysfunction. No pericardial effusion noted.    Assessment & Plan  Palpitations  Palpitations are present but not consistently recorded by the device, which may not capture all events unless she is fast or prolonged. A device check is necessary to ensure proper pacing and rule out arrhythmias.  - Schedule device check as soon as possible.  - Follow up after device check.    Hyperlipidemia  Hyperlipidemia is managed with atorvastatin 80 mg nightly.  - Continue atorvastatin 80 mg nightly.    ICM s/p CRT-D (MDT July 2022)  h/o VT  Sotalol monitoring  Device reprogrammed in March 2024 to allow detection and treatment of slower VT  Patient has a [employer] Biventricular ICD.  Anticipated battery longevity 8.2yrs (as of 2/5/25).    RA pacing 52.4%, RV pacing 95.1%. Biv 93.7%  Arrhythmias noted on interrogation: Some short NSVT and AT noted.  Lead parameters stable with steady impedance and thresholds noted: Stable    Sotalol (Betapace) - High risk medications    Labs:  Recent Labs     04/16/25  0852 12/16/24  1206 10/08/24  1325 07/30/24  1007 06/27/24  0905 03/15/24  0842 12/18/23  0521 10/02/23  1037 03/14/23  0415   CREATININE 0.75 0.88  --   --  0.88 1.07* 1.15*   < > CANCELED   BUN 19 22  --   --  16 23 23   < > CANCELED   EGFR 86 71  --   --  71 56* 52*   < >  --    K 3.5 4.4  --   --  4.6 4.1 3.9   < > CANCELED   MG  --  2.32 2.13 1.78  --   --  2.15  --  CANCELED    < > = values in this interval not displayed.   - Continue with Sotalol 120mg twice a day     Return to Clinic: Patient should return to the EP Clinic in 6-8 weeks    Clement Perez MD [facility]  Cardiac Electrophysiology  [email or web address]    **Disclaimer: This note was dictated by speech recognition, and every effort has been made to prevent any error in transcription, however minor errors may be present**    This medical note was created with the assistance of artificial intelligence (AI) for documentation purposes. The content has been reviewed and confirmed by the healthcare provider for accuracy and completeness. Patient consented to the use of audio recording and use of AI during their visit.       99

## (undated) DEVICE — GUIDEWIRE, PRESSURE WIRE X , 175CM WIRELESS, AGILE TIP

## (undated) DEVICE — NEEDLE, MERIT ADVANCE, ONE WALL, 21 GA X 4CM, STANDARD SMOOTH

## (undated) DEVICE — INTRODUCER SHEATH, GLIDESHEATH, 6FR 10CM

## (undated) DEVICE — TR BAND, RADIAL COMPRESSION, STANDARD, 24CM

## (undated) DEVICE — Device

## (undated) DEVICE — INFLATION DEVICE, BASIXCOMPAX, 30 ATM/BAR, 20ML, MAP152

## (undated) DEVICE — CATHETER, OPTITORQUE, 5FR, JACKY, 3.5/ 2H/110CM, CURVED

## (undated) DEVICE — MANIFOLD KIT, CUSTOM (SJM)